# Patient Record
Sex: FEMALE | Race: WHITE | NOT HISPANIC OR LATINO | Employment: OTHER | URBAN - METROPOLITAN AREA
[De-identification: names, ages, dates, MRNs, and addresses within clinical notes are randomized per-mention and may not be internally consistent; named-entity substitution may affect disease eponyms.]

---

## 2017-06-14 PROBLEM — G56.00 ACUTE CARPAL TUNNEL SYNDROME: Status: ACTIVE | Noted: 2017-06-14

## 2018-08-13 PROBLEM — M67.02: Status: ACTIVE | Noted: 2018-08-13

## 2018-08-13 PROBLEM — M67.88 ACHILLES TENDONOSIS OF LEFT LOWER EXTREMITY: Status: ACTIVE | Noted: 2018-08-13

## 2019-05-01 ENCOUNTER — TELEPHONE (OUTPATIENT)
Dept: FAMILY MEDICINE | Facility: CLINIC | Age: 74
End: 2019-05-01

## 2019-05-02 ENCOUNTER — TELEPHONE (OUTPATIENT)
Dept: FAMILY MEDICINE | Facility: CLINIC | Age: 74
End: 2019-05-02

## 2019-05-02 NOTE — TELEPHONE ENCOUNTER
----- Message from Karishma Cardoza sent at 5/1/2019  4:49 PM CDT -----  Contact: pt  Calling in regards to a missed call and not sure why and please advise [phone] (home)

## 2019-06-26 ENCOUNTER — PATIENT OUTREACH (OUTPATIENT)
Dept: ADMINISTRATIVE | Facility: HOSPITAL | Age: 74
End: 2019-06-26

## 2019-06-26 NOTE — PROGRESS NOTES
Health Maintenance Due   Topic Date Due    Hepatitis C Screening  1945    TETANUS VACCINE  10/02/1965    Shingles Vaccine (1 of 2) 10/02/1997    Pneumococcal Vaccine (65+ Low/Medium Risk) (1 of 2 - PCV13) 10/02/2012     Chart review completed 06/26/2019  Future Appointments   Date Time Provider Department Center   7/10/2019  8:00 AM Connor Viramontes MD [facility]

## 2019-08-06 ENCOUNTER — TELEPHONE (OUTPATIENT)
Dept: FAMILY MEDICINE | Facility: CLINIC | Age: 74
End: 2019-08-06

## 2019-08-06 DIAGNOSIS — E03.4 HYPOTHYROIDISM DUE TO ACQUIRED ATROPHY OF THYROID: Primary | ICD-10-CM

## 2019-08-06 NOTE — TELEPHONE ENCOUNTER
----- Message from De Naranjo sent at 8/6/2019  9:59 AM CDT -----  Contact: pt  Type: Needs Medical Advice    Who Called:  pt    Best Call Back Number: [phone]  Additional Information: Pt is following Dr. Oubre to [facility]. Pt has an appointment set on on 9/30/19. Pt would like orders for blood work sent to the [facility]. Please call to advise.

## 2019-09-17 ENCOUNTER — PATIENT OUTREACH (OUTPATIENT)
Dept: ADMINISTRATIVE | Facility: HOSPITAL | Age: 74
End: 2019-09-17

## 2019-09-30 ENCOUNTER — OFFICE VISIT (OUTPATIENT)
Dept: FAMILY MEDICINE | Facility: CLINIC | Age: 74
End: 2019-09-30
Payer: MEDICARE

## 2019-09-30 VITALS
DIASTOLIC BLOOD PRESSURE: 80 MMHG | HEIGHT: 68 IN | BODY MASS INDEX: 35.11 KG/M2 | WEIGHT: 231.69 LBS | TEMPERATURE: 98 F | OXYGEN SATURATION: 95 % | SYSTOLIC BLOOD PRESSURE: 128 MMHG | HEART RATE: 82 BPM

## 2019-09-30 DIAGNOSIS — Z00.00 WELLNESS EXAMINATION: Primary | ICD-10-CM

## 2019-09-30 DIAGNOSIS — K21.9 GASTROESOPHAGEAL REFLUX DISEASE WITHOUT ESOPHAGITIS: ICD-10-CM

## 2019-09-30 DIAGNOSIS — I48.0 PAROXYSMAL ATRIAL FIBRILLATION: ICD-10-CM

## 2019-09-30 DIAGNOSIS — Z86.19 HISTORY OF HEPATITIS C: ICD-10-CM

## 2019-09-30 DIAGNOSIS — E78.2 MIXED HYPERLIPIDEMIA: ICD-10-CM

## 2019-09-30 DIAGNOSIS — M79.7 FIBROMYALGIA: ICD-10-CM

## 2019-09-30 DIAGNOSIS — D68.4: ICD-10-CM

## 2019-09-30 DIAGNOSIS — G43.009 MIGRAINE WITHOUT AURA AND WITHOUT STATUS MIGRAINOSUS, NOT INTRACTABLE: ICD-10-CM

## 2019-09-30 DIAGNOSIS — E03.4 HYPOTHYROIDISM DUE TO ACQUIRED ATROPHY OF THYROID: ICD-10-CM

## 2019-09-30 PROCEDURE — 99999 PR PBB SHADOW E&M-EST. PATIENT-LVL III: ICD-10-PCS | Mod: PBBFAC,,, | Performed by: INTERNAL MEDICINE

## 2019-09-30 PROCEDURE — 90662 IIV NO PRSV INCREASED AG IM: CPT | Mod: PBBFAC,PO

## 2019-09-30 PROCEDURE — 99397 PR PREVENTIVE VISIT,EST,65 & OVER: ICD-10-PCS | Mod: S$PBB,,, | Performed by: INTERNAL MEDICINE

## 2019-09-30 PROCEDURE — 99397 PER PM REEVAL EST PAT 65+ YR: CPT | Mod: S$PBB,,, | Performed by: INTERNAL MEDICINE

## 2019-09-30 PROCEDURE — 99999 PR PBB SHADOW E&M-EST. PATIENT-LVL III: CPT | Mod: PBBFAC,,, | Performed by: INTERNAL MEDICINE

## 2019-09-30 PROCEDURE — 99213 OFFICE O/P EST LOW 20 MIN: CPT | Mod: PBBFAC,PO,25 | Performed by: INTERNAL MEDICINE

## 2019-09-30 PROCEDURE — G0009 ADMIN PNEUMOCOCCAL VACCINE: HCPCS | Mod: PBBFAC,PO

## 2019-09-30 RX ORDER — POLYETHYLENE GLYCOL 3350 17 G/17G
17 POWDER, FOR SOLUTION ORAL DAILY PRN
COMMUNITY
End: 2023-04-24

## 2019-09-30 RX ORDER — CALCIUM CARBONATE 600 MG
TABLET ORAL
COMMUNITY
End: 2020-11-06

## 2019-09-30 NOTE — PROGRESS NOTES
Patient ID: Lauren Junior     Chief Complaint:   Chief Complaint   Patient presents with    \[facility] [place] [facility]\"" Care        HPI: New Patient to [facility] but known to me. Wellness exam. Doing well after Left achilles repair by Luis. Labs reviewed: all great but HDL and LDL high. We discussed diet and exercise because she declines med for now.     Review of Systems   Constitutional: Positive for activity change. Negative for unexpected weight change.   HENT: Negative.  Negative for hearing loss, rhinorrhea and trouble swallowing.    Eyes: Negative.  Negative for discharge and visual disturbance.   Respiratory: Negative.  Negative for chest tightness and wheezing.    Cardiovascular: Negative.  Negative for chest pain and palpitations.   Gastrointestinal: Positive for constipation. Negative for blood in stool, diarrhea and vomiting.   Endocrine: Negative.  Negative for polydipsia and polyuria.   Genitourinary: Negative.  Negative for difficulty urinating, dysuria, hematuria and menstrual problem.   Musculoskeletal: Negative.  Negative for arthralgias, joint swelling and neck pain.   Skin: Negative.    Allergic/Immunologic: Negative.    Neurological: Negative.  Negative for weakness and headaches.   Hematological: Negative.    Psychiatric/Behavioral: Negative.  Negative for confusion and dysphoric mood.   All other systems reviewed and are negative.         Objective:      Physical Exam   Physical Exam   Constitutional: She is oriented to person, place, and time. She appears well-developed and well-nourished.   HENT:   Head: Normocephalic and atraumatic.   Nose: Nose normal.   Mouth/Throat: Oropharynx is clear and moist.   Eyes: Pupils are equal, round, and reactive to light. Conjunctivae and EOM are normal.   Neck: Normal range of motion. Neck supple.   Cardiovascular: Normal rate, regular rhythm, normal heart sounds and intact distal pulses.   Pulmonary/Chest: Effort normal and breath sounds normal.   Abdominal: Soft. Bowel  "sounds are normal.   Musculoskeletal: Normal range of motion.   Neurological: She is alert and oriented to person, place, and time.   Skin: Skin is warm and dry. Capillary refill takes less than 2 seconds.   Psychiatric: She has a normal mood and affect. Her behavior is normal. Judgment and thought content normal.   Nursing note and vitals reviewed.      Current Outpatient Medications:     amitriptyline (ELAVIL) 100 MG tablet, Take 100 mg by mouth every evening. , Disp: , Rfl: 0    calcium carbonate (CALCIUM 600) 600 mg calcium (1,500 mg) Tab, Calcium 600  1 po qam, Disp: , Rfl:     calcium carbonate (OS-KATLYN) 600 mg (1,500 mg) Tab, Take 600 mg by mouth 2 (two) times daily with meals., Disp: , Rfl:     levothyroxine (SYNTHROID) 88 MCG tablet, Take 100 mcg by mouth once daily. , Disp: , Rfl: 0    LYRICA 100 mg capsule, Take 100 mg by mouth 2 (two) times daily. , Disp: , Rfl: 2    multivitamin (ONE DAILY MULTIVITAMIN) per tablet, Take 1 tablet by mouth once daily., Disp: , Rfl:     multivitamin Tab, multivitamin tablet  Take 1 tablet every day by oral route., Disp: , Rfl:     polyethylene glycol (GLYCOLAX) 17 gram/dose powder, Miralax 17 gram/dose oral powder  17g prn constipation, Disp: , Rfl:     tramadol (ULTRAM) 50 mg tablet, Take 50 mg by mouth as needed for Pain., Disp: , Rfl:     venlafaxine (EFFEXOR-XR) 75 MG 24 hr capsule, Take 75 mg by mouth 2 (two) times daily. , Disp: , Rfl: 5    vitamin D 1000 units Tab, Take 2,000 Units by mouth once daily. 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    Diagnoses and all orders for this visit:    Wellness examination    Fibromyalgia  Controlled     Deficiency of clotting factor  Has a tendency to bleed freely    History of hepatitis C  Hep C Ab will be persistently Positive- cured w/ Interferon & Ribavarin    Migraine without aura and without status migrainosus, not intractable  Controlled     Paroxysmal atrial fibrillation  No issues in many years     Hypothyroidism due to acquired atrophy of thyroid  Controlled     Gastroesophageal reflux disease without esophagitis  Controlled     Mixed Hyperlipidemia  Monitor after diet and weight loss    Other orders  -     (In Office Administered) Pneumococcal Conjugate Vaccine (13 Valent) (IM)       HD Flu shot & Prevnar today

## 2019-11-18 NOTE — TELEPHONE ENCOUNTER
----- Message from Francisca Howard sent at 11/18/2019  3:03 PM CST -----  Contact: Travon  Type:  RX Refill Request    Who Called:  Travon, [facility]  Refill or New Rx:  Refill   RX Name and Strength:  levothyroxine (SYNTHROID) 88 MCG tablet  How is the patient currently taking it? (ex. 1XDay):  1XDay  Is this a 30 day or 90 day RX:    Preferred Pharmacy with phone number:  [facility]   Fax #: [phone]  escript id: [phone]    Local or Mail Order:  mail  Ordering Provider:    Best Call Back Number:  [phone]  Additional Information:

## 2019-11-19 RX ORDER — LEVOTHYROXINE SODIUM 100 UG/1
100 TABLET ORAL
Qty: 90 TABLET | Refills: 1 | Status: SHIPPED | OUTPATIENT
Start: 2019-11-19 | End: 2020-01-08 | Stop reason: SDUPTHER

## 2020-01-08 ENCOUNTER — TELEPHONE (OUTPATIENT)
Dept: FAMILY MEDICINE | Facility: CLINIC | Age: 75
End: 2020-01-08

## 2020-01-08 DIAGNOSIS — E03.4 HYPOTHYROIDISM DUE TO ACQUIRED ATROPHY OF THYROID: Primary | ICD-10-CM

## 2020-01-08 DIAGNOSIS — M79.7 FIBROMYALGIA: ICD-10-CM

## 2020-01-08 RX ORDER — LEVOTHYROXINE SODIUM 100 UG/1
100 TABLET ORAL
Qty: 90 TABLET | Refills: 1 | Status: SHIPPED | OUTPATIENT
Start: 2020-01-08 | End: 2020-01-09 | Stop reason: SDUPTHER

## 2020-01-08 NOTE — TELEPHONE ENCOUNTER
----- Message from Shan Mitchell sent at 1/8/2020 12:31 PM CST -----  Contact: pt  Type:  RX Refill Request    Who Called:  pt  Refill or New Rx:  refill  RX Name and Strength:  levothyroxine (SYNTHROID) 100 MCG tablet and tramadol (ULTRAM) 50 mg tablet  How is the patient currently taking it? (ex. 1XDay):  1 x / as needed  Is this a 30 day or 90 day RX:  30  Preferred Pharmacy with phone number:    [id number] - [place] [address] [address] [place] [id number]  Phone: [phone] Fax: [phone]    Local or Mail Order:    Ordering Provider:    Best Call Back Number:  [phone]  Additional Information:  Pt states the synthroid should be 112 mcg not 100.  Pt has 7 days left of the Rx

## 2020-01-08 NOTE — TELEPHONE ENCOUNTER
Pt states the synthroid should be 112 mcg not 100.  Pt has 7 days left of the Rx...     Ultram comes in 50mg.. No change in that dose, just needs refill.

## 2020-01-09 RX ORDER — TRAMADOL HYDROCHLORIDE 50 MG/1
50 TABLET ORAL EVERY 6 HOURS PRN
Qty: 120 TABLET | Refills: 0 | Status: SHIPPED | OUTPATIENT
Start: 2020-01-09 | End: 2020-01-14 | Stop reason: SDUPTHER

## 2020-01-09 RX ORDER — LEVOTHYROXINE SODIUM 112 UG/1
112 TABLET ORAL
Qty: 90 TABLET | Refills: 1 | Status: SHIPPED | OUTPATIENT
Start: 2020-01-09 | End: 2020-07-27

## 2020-01-09 NOTE — TELEPHONE ENCOUNTER
Callback to patient-- Pt made aware that prescriptions were sent to [facility] pharmacy.

## 2020-01-14 ENCOUNTER — DOCUMENTATION ONLY (OUTPATIENT)
Dept: FAMILY MEDICINE | Facility: CLINIC | Age: 75
End: 2020-01-14

## 2020-01-14 DIAGNOSIS — M79.7 FIBROMYALGIA: ICD-10-CM

## 2020-01-14 NOTE — PROGRESS NOTES
PA:    Tramadol 50 mg tablets    [facility] key:   [id number]  Case ID:    [id number]  [employer]  ----------------------------    Determination:   APPROVED  Valid:  1/15/2020-12/31/2020

## 2020-01-15 ENCOUNTER — TELEPHONE (OUTPATIENT)
Dept: FAMILY MEDICINE | Facility: CLINIC | Age: 75
End: 2020-01-15

## 2020-01-15 RX ORDER — TRAMADOL HYDROCHLORIDE 50 MG/1
50 TABLET ORAL EVERY 6 HOURS PRN
Qty: 120 TABLET | Refills: 0 | Status: SHIPPED | OUTPATIENT
Start: 2020-01-15 | End: 2021-07-29 | Stop reason: SDUPTHER

## 2020-01-15 NOTE — TELEPHONE ENCOUNTER
----- Message from Sonal Perera sent at 1/15/2020  8:55 AM CST -----  Contact: Miles   Type:  Pharmacy Calling to Clarify an RX    Name of Caller:  Miles clinical pharmacy review-[facility] Name:   Prescription Name:  Tramadol Hcl What do they need to clarify?:    Best Call Back Number:  [phone]  Additional Information:  Rx need PA

## 2020-07-26 DIAGNOSIS — E03.4 HYPOTHYROIDISM DUE TO ACQUIRED ATROPHY OF THYROID: ICD-10-CM

## 2020-07-27 RX ORDER — LEVOTHYROXINE SODIUM 112 UG/1
TABLET ORAL
Qty: 90 TABLET | Refills: 0 | Status: SHIPPED | OUTPATIENT
Start: 2020-07-27 | End: 2020-11-06 | Stop reason: SDUPTHER

## 2020-10-05 ENCOUNTER — PATIENT MESSAGE (OUTPATIENT)
Dept: ADMINISTRATIVE | Facility: HOSPITAL | Age: 75
End: 2020-10-05

## 2020-10-06 ENCOUNTER — PATIENT MESSAGE (OUTPATIENT)
Dept: FAMILY MEDICINE | Facility: CLINIC | Age: 75
End: 2020-10-06

## 2020-10-07 RX ORDER — OMEPRAZOLE 40 MG/1
40 CAPSULE, DELAYED RELEASE ORAL DAILY
COMMUNITY
End: 2020-10-07 | Stop reason: SDUPTHER

## 2020-10-07 NOTE — TELEPHONE ENCOUNTER
Refill Routing Note   Medication(s) are not appropriate for processing by [facility] for the following reason(s):     - Non-participating provider    [facility] actions taken in this encounter: Route          Medication reconciliation completed: No   Automatic Epic Generated Protocol Data:        Requested Prescriptions   Pending Prescriptions Disp Refills    omeprazole (PRILOSEC) 40 MG capsule 30 capsule 2     Sig: Take 1 capsule (40 mg total) by mouth once daily.       Proton Pump Inhibitors Protocol Passed - 10/7/2020  9:49 AM        Passed - Patient is not currently pregnant        Passed - No positive pregnancy test in past 12 months         Passed - Not on Clopidogrel (Plavix) or if on, refill is for Pantoprazole (Protonix)        Passed - No osteoporosis diagnosis on problem list        Passed - Visit with authorizing provider in past 12 months or upcoming 90 days          Signed Prescriptions Disp Refills    omeprazole (PRILOSEC) 40 MG capsule       Sig: Take 40 mg by mouth once daily.       There is no refill protocol information for this order           Appointments  past 12m or future 3m with PCP    Date Provider   Last Visit   9/30/2019 Connor Viramontes MD   Next Visit   11/6/2020 Connor Viramontes MD   ED visits in past 90 days: [unfilled]        Note composed:4:23 PM 10/07/2020

## 2020-10-08 RX ORDER — OMEPRAZOLE 40 MG/1
40 CAPSULE, DELAYED RELEASE ORAL DAILY
Qty: 90 CAPSULE | Refills: 3 | Status: SHIPPED | OUTPATIENT
Start: 2020-10-08 | End: 2022-01-31 | Stop reason: SDUPTHER

## 2020-10-30 ENCOUNTER — PATIENT MESSAGE (OUTPATIENT)
Dept: FAMILY MEDICINE | Facility: CLINIC | Age: 75
End: 2020-10-30

## 2020-10-30 DIAGNOSIS — E03.9 HYPOTHYROIDISM, UNSPECIFIED TYPE: ICD-10-CM

## 2020-10-30 DIAGNOSIS — I48.91 ATRIAL FIBRILLATION, UNSPECIFIED TYPE: ICD-10-CM

## 2020-10-30 DIAGNOSIS — E78.2 MIXED HYPERLIPIDEMIA: Primary | ICD-10-CM

## 2020-11-06 ENCOUNTER — OFFICE VISIT (OUTPATIENT)
Dept: FAMILY MEDICINE | Facility: CLINIC | Age: 75
End: 2020-11-06
Payer: MEDICARE

## 2020-11-06 ENCOUNTER — DOCUMENTATION ONLY (OUTPATIENT)
Dept: FAMILY MEDICINE | Facility: CLINIC | Age: 75
End: 2020-11-06

## 2020-11-06 VITALS
BODY MASS INDEX: 35.75 KG/M2 | DIASTOLIC BLOOD PRESSURE: 84 MMHG | HEART RATE: 83 BPM | HEIGHT: 68 IN | SYSTOLIC BLOOD PRESSURE: 136 MMHG | OXYGEN SATURATION: 96 % | TEMPERATURE: 98 F

## 2020-11-06 DIAGNOSIS — E78.2 MIXED HYPERLIPIDEMIA: ICD-10-CM

## 2020-11-06 DIAGNOSIS — E03.4 HYPOTHYROIDISM DUE TO ACQUIRED ATROPHY OF THYROID: ICD-10-CM

## 2020-11-06 DIAGNOSIS — Z00.00 WELLNESS EXAMINATION: Primary | ICD-10-CM

## 2020-11-06 DIAGNOSIS — I48.0 PAROXYSMAL ATRIAL FIBRILLATION: ICD-10-CM

## 2020-11-06 DIAGNOSIS — F50.81 BINGE EATING DISORDER: ICD-10-CM

## 2020-11-06 PROBLEM — F50.819 BINGE EATING DISORDER: Status: ACTIVE | Noted: 2020-11-06

## 2020-11-06 PROCEDURE — 99397 PR PREVENTIVE VISIT,EST,65 & OVER: ICD-10-PCS | Mod: S$PBB,,, | Performed by: INTERNAL MEDICINE

## 2020-11-06 PROCEDURE — 99397 PER PM REEVAL EST PAT 65+ YR: CPT | Mod: S$PBB,,, | Performed by: INTERNAL MEDICINE

## 2020-11-06 PROCEDURE — 99213 OFFICE O/P EST LOW 20 MIN: CPT | Mod: PBBFAC,PO | Performed by: INTERNAL MEDICINE

## 2020-11-06 PROCEDURE — 99999 PR PBB SHADOW E&M-EST. PATIENT-LVL III: CPT | Mod: PBBFAC,,, | Performed by: INTERNAL MEDICINE

## 2020-11-06 PROCEDURE — 99999 PR PBB SHADOW E&M-EST. PATIENT-LVL III: ICD-10-PCS | Mod: PBBFAC,,, | Performed by: INTERNAL MEDICINE

## 2020-11-06 RX ORDER — LISDEXAMFETAMINE DIMESYLATE 30 MG/1
30 CAPSULE ORAL EVERY MORNING
Qty: 30 CAPSULE | Refills: 0 | Status: SHIPPED | OUTPATIENT
Start: 2020-11-06 | End: 2020-11-06 | Stop reason: DRUGHIGH

## 2020-11-06 RX ORDER — LEVOTHYROXINE SODIUM 112 UG/1
112 TABLET ORAL DAILY
Qty: 90 TABLET | Refills: 3 | Status: SHIPPED | OUTPATIENT
Start: 2020-11-06 | End: 2021-10-07

## 2020-11-06 RX ORDER — LISDEXAMFETAMINE DIMESYLATE CAPSULES 10 MG/1
10 CAPSULE ORAL DAILY
Qty: 30 CAPSULE | Refills: 0 | Status: SHIPPED | OUTPATIENT
Start: 2020-11-06 | End: 2021-07-19

## 2020-11-06 NOTE — PROGRESS NOTES
Patient ID: Lauren Junior     Chief Complaint:   Chief Complaint   Patient presents with    Review labs        HPI: Wellness exam. Doing ok overall, but appropriately upset due to her best friend passing away from MRSA sepsis, her brother is in memory care,  had back surgery. She is in therapy due to binge eating disorder. We discussed and will try Vyvanse. Labs reviewed and are all good. LDL and HDL both high and ratio good. She declines statins for now. ACP talk at next office visit     Review of Systems   Constitutional: Positive for unexpected weight change. Negative for activity change.   HENT: Negative.  Negative for hearing loss, rhinorrhea and trouble swallowing.    Eyes: Negative.  Negative for discharge and visual disturbance.   Respiratory: Negative.  Negative for chest tightness and wheezing.    Cardiovascular: Negative.  Negative for chest pain and palpitations.   Gastrointestinal: Negative.  Negative for blood in stool, constipation, diarrhea and vomiting.   Endocrine: Negative.  Negative for polydipsia and polyuria.   Genitourinary: Negative.  Negative for difficulty urinating, dysuria, hematuria and menstrual problem.   Musculoskeletal: Negative.  Negative for arthralgias, joint swelling and neck pain.   Skin: Negative.    Allergic/Immunologic: Negative.    Neurological: Negative.  Negative for weakness and headaches.   Hematological: Negative.    Psychiatric/Behavioral: Negative.  Negative for confusion and dysphoric mood.          Objective:      Physical Exam   Physical Exam  Vitals signs and nursing note reviewed.   Constitutional:       Appearance: Normal appearance. She is well-developed.   HENT:      Head: Normocephalic and atraumatic.      Nose: Nose normal.   Eyes:      Extraocular Movements: Extraocular movements intact.      Conjunctiva/sclera: Conjunctivae normal.      Pupils: Pupils are equal, round, and reactive to light.   Neck:      Musculoskeletal: Normal range of motion and  "neck supple.   Cardiovascular:      Rate and Rhythm: Normal rate and regular rhythm.      Pulses: Normal pulses.      Heart sounds: Normal heart sounds.   Pulmonary:      Effort: Pulmonary effort is normal.      Breath sounds: Normal breath sounds.   Abdominal:      General: Bowel sounds are normal.      Palpations: Abdomen is soft.   Musculoskeletal: Normal range of motion.   Skin:     General: Skin is warm and dry.      Capillary Refill: Capillary refill takes less than 2 seconds.   Neurological:      General: No focal deficit present.      Mental Status: She is alert and oriented to person, place, and time.   Psychiatric:         Mood and Affect: Mood normal.         Behavior: Behavior normal.         Thought Content: Thought content normal.         Judgment: Judgment normal.            Vitals:   Vitals:    11/06/20 1107   BP: 136/84   Pulse: 83   Temp: 98.1 °F (36.7 °C)   TempSrc: Oral   SpO2: 96%   Weight: Comment: Patient refused   Height: 5' 7.5" (1.715 m)          Current Outpatient Medications:     amitriptyline (ELAVIL) 100 MG tablet, Take 100 mg by mouth every evening. , Disp: , Rfl: 0    levothyroxine (SYNTHROID) 112 MCG tablet, TAKE 1 TABLET BY MOUTH ONCE DAILY BEFORE BREAKFAST, Disp: 90 tablet, Rfl: 0    LYRICA 100 mg capsule, Take 100 mg by mouth 2 (two) times daily. , Disp: , Rfl: 2    multivitamin Tab, multivitamin tablet  Take 1 tablet every day by oral route., Disp: , Rfl:     omeprazole (PRILOSEC) 40 MG capsule, Take 1 capsule (40 mg total) by mouth once daily., Disp: 90 capsule, Rfl: 3    polyethylene glycol (GLYCOLAX) 17 gram/dose powder, Miralax 17 gram/dose oral powder  17g prn constipation, Disp: , Rfl:     traMADol (ULTRAM) 50 mg tablet, Take 1 tablet (50 mg total) by mouth every 6 (six) hours as needed for Pain., Disp: 120 tablet, Rfl: 0    venlafaxine (EFFEXOR-XR) 75 MG 24 hr capsule, Take 75 mg by mouth 2 (two) times daily. , Disp: , Rfl: 5    vitamin D 1000 units Tab, Take " 2,000 Units by mouth once daily. , Disp: , Rfl:     lisdexamfetamine (VYVANSE) 30 MG capsule, Take 1 capsule (30 mg total) by mouth every morning., Disp: 30 capsule, Rfl: 0   Assessment:       Patient Active Problem List    Diagnosis Date Noted    Binge eating disorder 11/06/2020    Mixed hyperlipidemia 09/30/2019    Fibromyalgia     Deficiency of clotting factor     History of hepatitis C     Migraines     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    Diagnoses and all orders for this visit:    Lauren was seen today for review labs.    Diagnoses and all orders for this visit:    Wellness examination    Binge eating disorder  -     lisdexamfetamine (VYVANSE) 10 MG capsule; Take 1 capsule (30 mg total) by mouth every morning.    Mixed hyperlipidemia  Monitor     Hypothyroidism due to acquired atrophy of thyroid  Controlled    Paroxysmal atrial fibrillation  Controlled

## 2020-11-09 ENCOUNTER — PATIENT OUTREACH (OUTPATIENT)
Dept: ADMINISTRATIVE | Facility: HOSPITAL | Age: 75
End: 2020-11-09

## 2020-11-09 ENCOUNTER — PATIENT MESSAGE (OUTPATIENT)
Dept: FAMILY MEDICINE | Facility: CLINIC | Age: 75
End: 2020-11-09

## 2020-11-12 ENCOUNTER — PATIENT MESSAGE (OUTPATIENT)
Dept: FAMILY MEDICINE | Facility: CLINIC | Age: 75
End: 2020-11-12

## 2020-11-13 ENCOUNTER — PATIENT MESSAGE (OUTPATIENT)
Dept: FAMILY MEDICINE | Facility: CLINIC | Age: 75
End: 2020-11-13

## 2020-11-13 DIAGNOSIS — I10 ESSENTIAL HYPERTENSION: Primary | ICD-10-CM

## 2020-11-13 RX ORDER — AMLODIPINE BESYLATE 5 MG/1
5 TABLET ORAL DAILY
Qty: 30 TABLET | Refills: 11 | Status: SHIPPED | OUTPATIENT
Start: 2020-11-13 | End: 2021-01-27 | Stop reason: SDUPTHER

## 2020-11-14 ENCOUNTER — PATIENT MESSAGE (OUTPATIENT)
Dept: FAMILY MEDICINE | Facility: CLINIC | Age: 75
End: 2020-11-14

## 2020-12-04 ENCOUNTER — OFFICE VISIT (OUTPATIENT)
Dept: FAMILY MEDICINE | Facility: CLINIC | Age: 75
End: 2020-12-04
Payer: MEDICARE

## 2020-12-04 VITALS
SYSTOLIC BLOOD PRESSURE: 128 MMHG | OXYGEN SATURATION: 96 % | HEART RATE: 81 BPM | BODY MASS INDEX: 35.75 KG/M2 | TEMPERATURE: 98 F | HEIGHT: 68 IN | DIASTOLIC BLOOD PRESSURE: 82 MMHG

## 2020-12-04 DIAGNOSIS — Z71.89 ADVANCED CARE PLANNING/COUNSELING DISCUSSION: Primary | ICD-10-CM

## 2020-12-04 DIAGNOSIS — I10 ESSENTIAL HYPERTENSION: ICD-10-CM

## 2020-12-04 DIAGNOSIS — Z78.0 MENOPAUSE: ICD-10-CM

## 2020-12-04 PROCEDURE — 99214 OFFICE O/P EST MOD 30 MIN: CPT | Mod: S$PBB,,, | Performed by: INTERNAL MEDICINE

## 2020-12-04 PROCEDURE — 99214 PR OFFICE/OUTPT VISIT, EST, LEVL IV, 30-39 MIN: ICD-10-PCS | Mod: S$PBB,,, | Performed by: INTERNAL MEDICINE

## 2020-12-04 PROCEDURE — 99999 PR PBB SHADOW E&M-EST. PATIENT-LVL IV: ICD-10-PCS | Mod: PBBFAC,,, | Performed by: INTERNAL MEDICINE

## 2020-12-04 PROCEDURE — 99497 PR ADVNCD CARE PLAN 30 MIN: ICD-10-PCS | Mod: S$PBB,,, | Performed by: INTERNAL MEDICINE

## 2020-12-04 PROCEDURE — 99999 PR PBB SHADOW E&M-EST. PATIENT-LVL IV: CPT | Mod: PBBFAC,,, | Performed by: INTERNAL MEDICINE

## 2020-12-04 PROCEDURE — 99214 OFFICE O/P EST MOD 30 MIN: CPT | Mod: PBBFAC,PO,25 | Performed by: INTERNAL MEDICINE

## 2020-12-04 PROCEDURE — 99497 ADVNCD CARE PLAN 30 MIN: CPT | Mod: PBBFAC,PO | Performed by: INTERNAL MEDICINE

## 2020-12-04 PROCEDURE — 99497 ADVNCD CARE PLAN 30 MIN: CPT | Mod: S$PBB,,, | Performed by: INTERNAL MEDICINE

## 2020-12-04 NOTE — PROGRESS NOTES
Patient ID: Lauren Junior     Chief Complaint:   Chief Complaint   Patient presents with    Follow up ACP    Weight Check        HPI:  Here to monitor weight loss after we started Vyvanse.  However, we had to stop Vyvanse because it gave the patient high blood pressure.  We have since started low-dose amlodipine to modulate her blood pressure and is working very well.  As such, she has started a gym routine exercising 3 days a week for least an hour doing combination of aerobic and anaerobic exercises with strength training and is feeling very good.  Were also here to discuss advance care planning.  She has had a living will and medical power  completed and signed since 2011 and I have reviewed the living will with her.  She will get me a copy of her medical power  who is her .  We discussed DNR status and we both agree that she should be a full code at this point and we have completed the look post form accordingly.    Review of Systems   Constitutional: Negative.    HENT: Negative.    Eyes: Negative.    Respiratory: Negative.    Cardiovascular: Negative.    Gastrointestinal: Negative.    Endocrine: Negative.    Genitourinary: Negative.    Musculoskeletal: Negative.    Skin: Negative.    Allergic/Immunologic: Negative.    Neurological: Negative.    Hematological: Negative.    Psychiatric/Behavioral: Negative.           Objective:      Physical Exam   Physical Exam  Vitals signs and nursing note reviewed.   Constitutional:       Appearance: Normal appearance. She is well-developed.   HENT:      Head: Normocephalic and atraumatic.      Nose: Nose normal.   Eyes:      Extraocular Movements: Extraocular movements intact.      Conjunctiva/sclera: Conjunctivae normal.      Pupils: Pupils are equal, round, and reactive to light.   Neck:      Musculoskeletal: Normal range of motion and neck supple.   Cardiovascular:      Rate and Rhythm: Normal rate and regular rhythm.      Pulses: Normal pulses.       Heart sounds: Normal heart sounds.   Pulmonary:      Effort: Pulmonary effort is normal.      Breath sounds: Normal breath sounds.   Abdominal:      General: Bowel sounds are normal.      Palpations: Abdomen is soft.   Musculoskeletal: Normal range of motion.   Skin:     General: Skin is warm and dry.      Capillary Refill: Capillary refill takes less than 2 seconds.   Neurological:      General: No focal deficit present.      Mental Status: She is alert and oriented to person, place, and time.   Psychiatric:         Mood and Affect: Mood normal.         Behavior: Behavior normal.         Thought Content: Thought content normal.         Judgment: Judgment normal.     Advance Care Planning     Power of   I initiated the process of advance care planning today and explained the importance of this process to the patient.  I introduced the concept of advance directives to the patient, as well. Then the patient received detailed information about the importance of designating a Health Care Power of  (HCPOA). She was also instructed to communicate with this person about their wishes for future healthcare, should she become sick and lose decision-making capacity. The patient has previously appointed a HCPOA. After our discussion, the patient has decided to complete a HCPOA and has appointed her , NAME:Froy Junior NUMBER: [phone] I spent a total time of 20 minutes discussing this issue with the patient.         Living Will  During this visit, I engaged the patient  in the advance care planning process.  The patient and I reviewed the role for advance directives and their purpose in directing future healthcare if the patient's unable to speak for him/herself.  At this point in time, the patient does have full decision-making capacity.  We discussed different extreme health states that she could experience, and reviewed what kind of medical care she would want in those situations.  The patient  "communicated that if she were comatose and had little chance of a meaningful recovery, she would not want machines/life-sustaining treatments used. In addition to the above preference, other important end-of-life issues for the patient include comfort care. The patient has completed a living will to reflect these preferences.  I spent a total of 20 minutes engaging the patient in this advance care planning discussion.          Code Status  FULL CODE                      Vitals:   Vitals:    12/04/20 1118   BP: 128/82   Pulse: 81   Temp: 98.3 °F (36.8 °C)   TempSrc: Oral   SpO2: 96%   Weight: Comment: Declined   Height: 5' 7.5" (1.715 m)          Current Outpatient Medications:     amitriptyline (ELAVIL) 100 MG tablet, Take 100 mg by mouth every evening. , Disp: , Rfl: 0    amLODIPine (NORVASC) 5 MG tablet, Take 1 tablet (5 mg total) by mouth once daily., Disp: 30 tablet, Rfl: 11    levothyroxine (SYNTHROID) 112 MCG tablet, Take 1 tablet (112 mcg total) by mouth once daily., Disp: 90 tablet, Rfl: 3    lisdexamfetamine (VYVANSE) 10 mg Cap, Take 10 mg by mouth once daily., Disp: 30 capsule, Rfl: 0    LYRICA 100 mg capsule, Take 100 mg by mouth 2 (two) times daily. , Disp: , Rfl: 2    multivitamin Tab, multivitamin tablet  Take 1 tablet every day by oral route., Disp: , Rfl:     omeprazole (PRILOSEC) 40 MG capsule, Take 1 capsule (40 mg total) by mouth once daily., Disp: 90 capsule, Rfl: 3    polyethylene glycol (GLYCOLAX) 17 gram/dose powder, Miralax 17 gram/dose oral powder  17g prn constipation, Disp: , Rfl:     traMADol (ULTRAM) 50 mg tablet, Take 1 tablet (50 mg total) by mouth every 6 (six) hours as needed for Pain., Disp: 120 tablet, Rfl: 0    venlafaxine (EFFEXOR-XR) 75 MG 24 hr capsule, Take 75 mg by mouth 2 (two) times daily. , Disp: , Rfl: 5    vitamin D 1000 units Tab, Take 2,000 Units by mouth once daily. 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    Diagnoses and all orders for this visit:    Lauren was seen today for follow up acp and weight check.    Diagnoses and all orders for this visit:    Advanced care planning/counseling discussion  Forms completed     Menopause  -     DXA Bone Density Spine And Hip; Future    Essential hypertension  Controlled

## 2020-12-11 ENCOUNTER — PATIENT MESSAGE (OUTPATIENT)
Dept: OTHER | Facility: OTHER | Age: 75
End: 2020-12-11

## 2020-12-29 ENCOUNTER — PATIENT MESSAGE (OUTPATIENT)
Dept: FAMILY MEDICINE | Facility: CLINIC | Age: 75
End: 2020-12-29

## 2020-12-30 ENCOUNTER — PATIENT MESSAGE (OUTPATIENT)
Dept: FAMILY MEDICINE | Facility: CLINIC | Age: 75
End: 2020-12-30

## 2020-12-30 ENCOUNTER — OFFICE VISIT (OUTPATIENT)
Dept: FAMILY MEDICINE | Facility: CLINIC | Age: 75
End: 2020-12-30
Payer: MEDICARE

## 2020-12-30 DIAGNOSIS — I10 ESSENTIAL HYPERTENSION: Primary | ICD-10-CM

## 2020-12-30 PROCEDURE — 99213 OFFICE O/P EST LOW 20 MIN: CPT | Mod: 95,,, | Performed by: NURSE PRACTITIONER

## 2020-12-30 PROCEDURE — 99213 PR OFFICE/OUTPT VISIT, EST, LEVL III, 20-29 MIN: ICD-10-PCS | Mod: 95,,, | Performed by: NURSE PRACTITIONER

## 2020-12-30 RX ORDER — METOPROLOL SUCCINATE 25 MG/1
25 TABLET, EXTENDED RELEASE ORAL DAILY
Qty: 30 TABLET | Refills: 11 | Status: SHIPPED | OUTPATIENT
Start: 2020-12-30 | End: 2021-01-27 | Stop reason: SDUPTHER

## 2020-12-30 NOTE — PROGRESS NOTES
Lauren Junior is a 75 y.o. female patient of Dr. Connor Viramontes MD who presents to the clinic today for No chief complaint on file.  .    HPI    Patient, who is not known to me, reports a new problem to me: she and Dr. Viramontes had discussed Vyvance for binge eating.  Has been eating a lot.  Daughter has been doing PT.  H/o afib--last 10 yrs ago.  /110; then a little better.  BP was elevated after the 2nd pill as well.  She sent the BP readings to Dr. Viramontes--checked against another BP cuff and it was ok..    Answers for HPI/ROS submitted by the patient on 12/30/2020   Hypertension  Chronicity: recurrent  Onset: more than 1 month ago  Progression since onset: unchanged  Condition status: resistant  anxiety: No  blurred vision: No  chest pain: No  headaches: No  malaise/fatigue: No  neck pain: No  orthopnea: No  palpitations: No  peripheral edema: No  PND: No  shortness of breath: No  sweats: No  Agents associated with hypertension: thyroid hormones  CAD risks: family history, obesity  Compliance problems: no compliance problems  Past treatments: calcium channel blockers  Improvement on treatment: no improvement      These symptoms began nearly 1 month  ago and status is continued elevation of her BP--DBP 90+ every day since 12/4/2020..     Pt denies the following symptoms:  Severe headache, change in vision, n/v, chest pain, trouble breathing    Aggravating factors include Vyvanse for binge eating .    Relieving factors include none yet .    OTC Medications tried are n/a.    Prescription medications taken for symptoms are amlodipine 5 mg.    Pertinent medical history:  Recently started on Vyvanse for binge eating.  Now BP elevated since taking the very first dose 12/4/2020.    ROS    Constitutional:  No fever, + fatigue r/t fibromyalgia.    HEENT:  No headache or change in vision.    Heart:    + palpitations r/t known h/o afib, no chest pain.    Lungs:   no difficulty breathing.    GI:  no nausea, no vomiting, no  diarrhea, no constipation    Urinary:  No change in urination.    MS:  No change in bones, joints or muscles.    Skin:  No concerns      Past Medical History:   Diagnosis Date    Acid reflux 9/9/2015    Arthritis     Atrial fibrillation 9/9/2015    Deficiency of clotting factor     Factor 11    Elevated blood pressure 9/9/2015    Encounter for blood transfusion 1967    Fibromyalgia     History of hepatitis C     s/p Interferon & Ribavarin     Hypothyroidism 9/9/2015    Kidney stones     Migraines     Thoracic outlet syndrome     s/p B rib resections       Current Outpatient Medications:     amitriptyline (ELAVIL) 100 MG tablet, Take 100 mg by mouth every evening. , Disp: , Rfl: 0    amLODIPine (NORVASC) 5 MG tablet, Take 1 tablet (5 mg total) by mouth once daily., Disp: 30 tablet, Rfl: 11    levothyroxine (SYNTHROID) 112 MCG tablet, Take 1 tablet (112 mcg total) by mouth once daily., Disp: 90 tablet, Rfl: 3    lisdexamfetamine (VYVANSE) 10 mg Cap, Take 10 mg by mouth once daily., Disp: 30 capsule, Rfl: 0    LYRICA 100 mg capsule, Take 100 mg by mouth 2 (two) times daily. , Disp: , Rfl: 2    multivitamin Tab, multivitamin tablet  Take 1 tablet every day by oral route., Disp: , Rfl:     omeprazole (PRILOSEC) 40 MG capsule, Take 1 capsule (40 mg total) by mouth once daily., Disp: 90 capsule, Rfl: 3    polyethylene glycol (GLYCOLAX) 17 gram/dose powder, Miralax 17 gram/dose oral powder  17g prn constipation, Disp: , Rfl:     traMADol (ULTRAM) 50 mg tablet, Take 1 tablet (50 mg total) by mouth every 6 (six) hours as needed for Pain., Disp: 120 tablet, Rfl: 0    venlafaxine (EFFEXOR-XR) 75 MG 24 hr capsule, Take 75 mg by mouth 2 (two) times daily. , Disp: , Rfl: 5    vitamin D 1000 units Tab, Take 2,000 Units by mouth once daily. , Disp: , Rfl:     Patient is not a smoker.    PHYSICAL EXAM    Alert, coop 75 y.o. female patient in no acut distress, not ill appearing.    There were no vitals filed for  this visit.  VS reviewed.  VS BP elevated (see note she sent to Dr. Viramontes yesterday).  CC, nursing note, medications & PMH all reviewed today.    Head:  Normocephalic, atraumatic.    EENT: external ears and nose normal in appearance.             Resp:  Respirations even, unlabored    CV: BP elevated (per pt 12/29     2:45    157/98  83                                         12/29     8:14   146/97  80  Took 10mg    MS:   Moves arms well.    NEURO:  Alert and oriented x 4.  Responds appropriately during interaction.        Skin:  Warm, dry, color good.    Psych:  Responds appropriately throughout the visit.               Relaxed.  Well-groomed    Essential hypertension  -     metoprolol succinate (TOPROL-XL) 25 MG 24 hr tablet; Take 1 tablet (25 mg total) by mouth once daily.  Dispense: 30 tablet; Refill: 11      Pt today presents with continued elevation of her BP despite taking the amlodipine 5 mg tablets, which she doubled x 1.  No symptoms associated with the elevation in the BP  .  Additionally, she has had an a fib episode yesterday and her pulse in is the 80s.    Physical exam shows key findings of alert, coop 75 yr old female in NAD.  Pt is relaxing in a recliner and appears comfortable.    Findings consistent with Essential HTN .    This is a new problem to me.  No work up is planned.        Pt advised to perform comfort measures recommended on patient instruction sheet, which were reviewed at the time of the visit..    If not better in 5-7 days, the patient is advised to call here, PCP office or go for an in-person/follow up evaluation.  If worse or concerns, the patient is advised to call for advise to this office or the PCP office or call [facility] or go to the nearest URGENT CARE or ER.  Explained exam findings, diagnosis and treatment plan to patient.  Questions answered and patient states understanding.

## 2020-12-30 NOTE — PATIENT INSTRUCTIONS
Eating Heart-Healthy Food: Using the DASH Plan    Eating for your heart doesnt have to be hard or boring. You just need to know how to make healthier choices. The DASH eating plan has been developed to help you do just that. DASH stands for Dietary Approaches to Stop Hypertension. It is a plan that has been proven to be healthier for your heart and to lower your risk for high blood pressure. It can also help lower your risk for cancer, heart disease, osteoporosis, and diabetes.  Choosing from each food group  Choose foods from each of the food groups below each day. Try to get the recommended number of servings for each food group. The serving numbers are based on a diet of 2,000 calories a day. Talk to your doctor if youre unsure about your calorie needs. Along with getting the correct servings, the DASH plan also recommends a sodium intake less than 2,300 mg per day.        Grains  Servings: 6 to 8 a day  A serving is:  · 1 slice bread  · 1 ounce dry cereal  · Half a cup cooked rice, pasta or cereal  Best choices: Whole grains and any grains high in fiber. Vegetables  Servings: 4 to 5 a day  A serving is:  · 1 cup raw leafy vegetable  · Half a cup cut-up raw or cooked vegetable  · Half a cup vegetable juice  Best choices: Fresh or frozen vegetables prepared without added salt or fat.   Fruits  Servings: 4 to 5 a day  A serving is:  · 1 medium fruit  · One-quarter cup dried fruit  · Half a cup fresh, frozen, or canned fruit  · Half a cup of 100% fruit juices  Best choices: A variety of fresh fruits of different colors. Whole fruits are a better choice than fruit juices. Low-fat or fat-free dairy  Servings: 2 to 3 a day  A serving is:  · 1 cup milk  · 1 cup yogurt  · One and a half ounces cheese  Best choices: Skim or 1% milk, low-fat or fat-free yogurt or buttermilk, and low-fat cheeses.         Lean meats, poultry, fish  Servings: 6 or fewer a day  A serving is:  · 1 ounce cooked meats, poultry, or fish  · 1  egg  Best choices: Lean poultry and fish. Trim away visible fat. Broil, grill, roast, or boil instead of frying. Remove skin from poultry before eating. Limit how much red meat you eat.  Nuts, seeds, beans  Servings: 4 to 5 a week  A serving is:  · One-third cup nuts (one and a half ounces)  · 2 tablespoons nut butter or seeds  · Half a cup cooked dry beans or legumes  Best choices: Dry roasted nuts with no salt added, lentils, kidney beans, garbanzo beans, and whole pierre beans.   Fats and oils  Servings: 2 to 3 a day  A serving is:  · 1 teaspoon vegetable oil  · 1 teaspoon soft margarine  · 1 tablespoon mayonnaise  · 2 tablespoons salad dressing  Best choices: Nut and vegetable oils (nontropical vegetable oils), such as olive and canola oil. Sweets  Servings: 5 a week or fewer  A serving is:  · 1 tablespoon sugar, maple syrup, or honey  · 1 tablespoon jam or jelly  · 1 half-ounce jelly beans (about 15)  · 1 cup lemonade  Best choices: Dried fruit can be a satisfying sweet. Choose low-fat sweets. And watch your serving sizes!      For more on the DASH eating plan, visit:  www.nhlbi.nih.gov/health/health-topics/topics/dash   Date Last Reviewed: 6/1/2016  © 9906-4981 [employer]. [address] [place] [address], [place] [address]. All rights reserved. This information is not intended as a substitute for professional medical care. Always follow your healthcare professional's instructions.      Eating Heart-Healthy Foods  Eating has a big impact on your heart health. In fact, eating healthier can improve several of your heart risks at once. For instance, it helps you manage weight, cholesterol, and blood pressure. Here are ideas to help you make heart-healthy changes without giving up all the foods and flavors you love.  Getting started  · Talk with your health care provider about eating plans, such as the DASH or Mediterranean diet. You may also be referred to a dietitian.  · Change a few things at a time. Give  yourself time to get used to a few eating changes before adding more.  · Work to create a tasty, healthy eating plan that you can stick to for the rest of your life.    Goals for healthy eating  Below are some tips to improve your eating habits:  · Limit saturated fats and trans fats. Saturated fats raise your levels of cholesterol, so keep these fats to a minimum. They are found in foods such as fatty meats, whole milk, cheese, and palm and coconut oils. Avoid trans fats because they lower good cholesterol as well as raise bad cholesterol. Trans fats are most often found in processed foods.  · Reduce sodium (salt) intake. Eating too much salt may increase your blood pressure. Limit your sodium intake to 2,300 milligrams (mg) per day, or less if your health care provider recommends it. Dining out less often and eating fewer processed foods are two great ways to decrease the amount of salt you consume.  · Managing calories. A calorie is a unit of energy. Your body burns calories for fuel, but if you eat more calories than your body burns, the extras are stored as fat. Your health care provider can help you create a diet plan to manage your calories. This will likely include eating healthier foods as well as exercising regularly. To help you track your progress, keep a diary to record what you eat and how often you exercise.  Choose the right foods  Aim to make these foods staples of your diet. If you have diabetes, you may have different recommendations than what is listed here:  · Fruits and vegetable provide plenty of nutrients without a lot of calories. At meals, fill half your plate with these foods. Split the other half of your plate between whole grains and lean protein.  · Whole grains are high in fiber and rich in vitamins and nutrients. Good choices include whole-wheat bread, pasta, and brown rice.  · Lean proteins give you nutrition with less fat. Good choices include fish, skinless chicken, and  beans.  · Low-fat or nonfat dairy provides nutrients without a lot of fat. Try low-fat or nonfat milk, cheese, or yogurt.  · Healthy fats can be good for you in small amounts. These are unsaturated fats, such as olive oil, nuts, and fish. Try to have at least 2 servings per week of fatty fish such as salmon, sardines, mackerel, rainbow trout, and albacore tuna. These contain omega-3 fatty acids, which are good for your heart. Flaxseed is another source of a heart-healthy fat.  More on heart healthy eating    Read food labels  Healthy eating starts at the grocery store. Be sure to pay attention to food labels on packaged foods. Look for products that are high in fiber and protein, and low in saturated fat, cholesterol, and sodium. Avoid products that contain trans fat. And pay close attention to serving size. For instance, if you plan to eat two servings, double all the numbers on the label.  Prepare food right  A key part of healthy cooking is cutting down on added fat and salt. Look on the internet for lower-fat, lower-sodium recipes. Also, try these tips:  · Remove fat from meat and skin from poultry before cooking.  · Skim fat from the surface of soups and sauces.  · Broil, boil, bake, steam, grill, and microwave food without added fats.  · Choose ingredients that spice up your food without adding calories, fat, or sodium. Try these items: horseradish, hot sauce, lemon, mustard, nonfat salad dressings, and vinegar. For salt-free herbs and spices, try basil, cilantro, cinnamon, pepper, and rosemary.  Date Last Reviewed: 6/25/2015  © 8353-0616 [employer]. [address] [place] [address], [place] [address]. All rights reserved. This information is not intended as a substitute for professional medical care. Always follow your healthcare professional's instructions.      Metoprolol extended-release tablets  What is this medicine?  METOPROLOL (me TOE proe lole) is a beta-blocker. Beta-blockers reduce the workload  on the heart and help it to beat more regularly. This medicine is used to treat high blood pressure and to prevent chest pain. It is also used to after a heart attack and to prevent an additional heart attack from occurring.  How should I use this medicine?  Take this medicine by mouth with a glass of water. Follow the directions on the prescription label. Do not crush or chew. Take this medicine with or immediately after meals. Take your doses at regular intervals. Do not take more medicine than directed. Do not stop taking this medicine suddenly. This could lead to serious heart-related effects.  · When you first start taking this medication, you may feel fatigued and low energy.  This is a slow release form of the medication and that effect will likely wear off.  However, if at any time you are concerned, please talk with Dr. Viramontes.  What should I watch for while using this medicine?  Visit your doctor or health care professional for regular check ups. Contact your doctor right away if your symptoms worsen. Check your blood pressure and pulse rate regularly. Ask your health care professional what your blood pressure and pulse rate should be, and when you should contact them.  You may get drowsy or dizzy. Do not drive, use machinery, or do anything that needs mental alertness until you know how this medicine affects you. Do not sit or stand up quickly, especially if you are an older patient. This reduces the risk of dizzy or fainting spells. Contact your doctor if these symptoms continue. Alcohol may interfere with the effect of this medicine. Avoid alcoholic drinks.  NOTE:This sheet is a summary. It may not cover all possible information. If you have questions about this medicine, talk to your doctor, pharmacist, or health care provider. Copyright© 2017 [employer]

## 2020-12-30 NOTE — Clinical Note
Connor Viramontes MD,  I saw your patient today in [facility]. If you have any questions, please do not hesitate to contact me.  Thank you!  REJI Dye, [facility]

## 2021-01-26 ENCOUNTER — PATIENT MESSAGE (OUTPATIENT)
Dept: FAMILY MEDICINE | Facility: CLINIC | Age: 76
End: 2021-01-26

## 2021-01-26 DIAGNOSIS — I10 ESSENTIAL HYPERTENSION: ICD-10-CM

## 2021-01-27 RX ORDER — AMLODIPINE BESYLATE 5 MG/1
5 TABLET ORAL DAILY
Qty: 90 TABLET | Refills: 3 | Status: SHIPPED | OUTPATIENT
Start: 2021-01-27 | End: 2022-01-31 | Stop reason: SDUPTHER

## 2021-01-27 RX ORDER — METOPROLOL SUCCINATE 25 MG/1
25 TABLET, EXTENDED RELEASE ORAL DAILY
Qty: 90 TABLET | Refills: 3 | Status: SHIPPED | OUTPATIENT
Start: 2021-01-27 | End: 2022-01-31 | Stop reason: SDUPTHER

## 2021-01-28 ENCOUNTER — PATIENT MESSAGE (OUTPATIENT)
Dept: FAMILY MEDICINE | Facility: CLINIC | Age: 76
End: 2021-01-28

## 2021-06-05 ENCOUNTER — PATIENT MESSAGE (OUTPATIENT)
Dept: FAMILY MEDICINE | Facility: CLINIC | Age: 76
End: 2021-06-05

## 2021-06-05 DIAGNOSIS — I10 ESSENTIAL HYPERTENSION: Primary | ICD-10-CM

## 2021-06-05 DIAGNOSIS — E03.4 HYPOTHYROIDISM DUE TO ACQUIRED ATROPHY OF THYROID: ICD-10-CM

## 2021-06-12 ENCOUNTER — PATIENT MESSAGE (OUTPATIENT)
Dept: FAMILY MEDICINE | Facility: CLINIC | Age: 76
End: 2021-06-12

## 2021-06-12 DIAGNOSIS — E83.52 HYPERCALCEMIA: Primary | ICD-10-CM

## 2021-07-19 PROBLEM — D05.11: Status: ACTIVE | Noted: 2021-07-19

## 2021-07-21 ENCOUNTER — PATIENT MESSAGE (OUTPATIENT)
Dept: FAMILY MEDICINE | Facility: CLINIC | Age: 76
End: 2021-07-21

## 2021-07-29 ENCOUNTER — OFFICE VISIT (OUTPATIENT)
Dept: FAMILY MEDICINE | Facility: CLINIC | Age: 76
End: 2021-07-29
Payer: MEDICARE

## 2021-07-29 VITALS
HEIGHT: 68 IN | BODY MASS INDEX: 33.12 KG/M2 | WEIGHT: 218.5 LBS | HEART RATE: 95 BPM | SYSTOLIC BLOOD PRESSURE: 116 MMHG | DIASTOLIC BLOOD PRESSURE: 82 MMHG | OXYGEN SATURATION: 97 %

## 2021-07-29 DIAGNOSIS — M79.7 FIBROMYALGIA: ICD-10-CM

## 2021-07-29 DIAGNOSIS — E03.4 HYPOTHYROIDISM DUE TO ACQUIRED ATROPHY OF THYROID: ICD-10-CM

## 2021-07-29 DIAGNOSIS — E83.52 HYPERCALCEMIA: ICD-10-CM

## 2021-07-29 DIAGNOSIS — I10 ESSENTIAL HYPERTENSION: Primary | ICD-10-CM

## 2021-07-29 DIAGNOSIS — I48.0 PAROXYSMAL ATRIAL FIBRILLATION: ICD-10-CM

## 2021-07-29 DIAGNOSIS — R05.9 COUGH: ICD-10-CM

## 2021-07-29 DIAGNOSIS — E78.2 MIXED HYPERLIPIDEMIA: ICD-10-CM

## 2021-07-29 PROCEDURE — 99999 PR PBB SHADOW E&M-EST. PATIENT-LVL IV: CPT | Mod: PBBFAC,,, | Performed by: INTERNAL MEDICINE

## 2021-07-29 PROCEDURE — 99214 OFFICE O/P EST MOD 30 MIN: CPT | Mod: PBBFAC,PO | Performed by: INTERNAL MEDICINE

## 2021-07-29 PROCEDURE — 99999 PR PBB SHADOW E&M-EST. PATIENT-LVL IV: ICD-10-PCS | Mod: PBBFAC,,, | Performed by: INTERNAL MEDICINE

## 2021-07-29 PROCEDURE — 99214 OFFICE O/P EST MOD 30 MIN: CPT | Mod: S$PBB,,, | Performed by: INTERNAL MEDICINE

## 2021-07-29 PROCEDURE — 99214 PR OFFICE/OUTPT VISIT, EST, LEVL IV, 30-39 MIN: ICD-10-PCS | Mod: S$PBB,,, | Performed by: INTERNAL MEDICINE

## 2021-07-29 RX ORDER — TRAMADOL HYDROCHLORIDE 50 MG/1
50 TABLET ORAL EVERY 6 HOURS PRN
Qty: 120 TABLET | Refills: 0 | Status: SHIPPED | OUTPATIENT
Start: 2021-07-29 | End: 2022-01-31 | Stop reason: SDUPTHER

## 2021-07-31 ENCOUNTER — PATIENT MESSAGE (OUTPATIENT)
Dept: FAMILY MEDICINE | Facility: CLINIC | Age: 76
End: 2021-07-31

## 2021-08-04 ENCOUNTER — PATIENT MESSAGE (OUTPATIENT)
Dept: FAMILY MEDICINE | Facility: CLINIC | Age: 76
End: 2021-08-04

## 2021-08-04 ENCOUNTER — TELEPHONE (OUTPATIENT)
Dept: ENDOCRINOLOGY | Facility: CLINIC | Age: 76
End: 2021-08-04

## 2021-08-04 ENCOUNTER — TELEPHONE (OUTPATIENT)
Dept: FAMILY MEDICINE | Facility: CLINIC | Age: 76
End: 2021-08-04

## 2021-08-04 DIAGNOSIS — E83.52 HYPERCALCEMIA: Primary | ICD-10-CM

## 2021-08-05 ENCOUNTER — PATIENT MESSAGE (OUTPATIENT)
Dept: FAMILY MEDICINE | Facility: CLINIC | Age: 76
End: 2021-08-05

## 2021-08-11 ENCOUNTER — PATIENT MESSAGE (OUTPATIENT)
Dept: FAMILY MEDICINE | Facility: CLINIC | Age: 76
End: 2021-08-11

## 2021-08-18 PROBLEM — D05.11 DUCTAL CARCINOMA IN SITU (DCIS) OF RIGHT BREAST: Status: ACTIVE | Noted: 2021-08-18

## 2021-09-10 ENCOUNTER — TELEPHONE (OUTPATIENT)
Dept: HEMATOLOGY/ONCOLOGY | Facility: CLINIC | Age: 76
End: 2021-09-10

## 2021-09-13 ENCOUNTER — OFFICE VISIT (OUTPATIENT)
Dept: ENDOCRINOLOGY | Facility: CLINIC | Age: 76
End: 2021-09-13
Payer: MEDICARE

## 2021-09-13 VITALS
OXYGEN SATURATION: 99 % | SYSTOLIC BLOOD PRESSURE: 114 MMHG | HEIGHT: 68 IN | DIASTOLIC BLOOD PRESSURE: 74 MMHG | HEART RATE: 73 BPM | BODY MASS INDEX: 32.91 KG/M2 | WEIGHT: 217.13 LBS

## 2021-09-13 DIAGNOSIS — E21.3 HYPERPARATHYROIDISM: Primary | ICD-10-CM

## 2021-09-13 DIAGNOSIS — E83.52 HYPERCALCEMIA: ICD-10-CM

## 2021-09-13 DIAGNOSIS — E03.9 ACQUIRED HYPOTHYROIDISM: ICD-10-CM

## 2021-09-13 PROCEDURE — 99204 PR OFFICE/OUTPT VISIT, NEW, LEVL IV, 45-59 MIN: ICD-10-PCS | Mod: S$PBB,,, | Performed by: INTERNAL MEDICINE

## 2021-09-13 PROCEDURE — 99999 PR PBB SHADOW E&M-EST. PATIENT-LVL V: ICD-10-PCS | Mod: PBBFAC,,, | Performed by: INTERNAL MEDICINE

## 2021-09-13 PROCEDURE — 99999 PR PBB SHADOW E&M-EST. PATIENT-LVL V: CPT | Mod: PBBFAC,,, | Performed by: INTERNAL MEDICINE

## 2021-09-13 PROCEDURE — 99204 OFFICE O/P NEW MOD 45 MIN: CPT | Mod: S$PBB,,, | Performed by: INTERNAL MEDICINE

## 2021-09-13 PROCEDURE — 99215 OFFICE O/P EST HI 40 MIN: CPT | Mod: PBBFAC,PO | Performed by: INTERNAL MEDICINE

## 2021-09-16 ENCOUNTER — PATIENT MESSAGE (OUTPATIENT)
Dept: ENDOCRINOLOGY | Facility: CLINIC | Age: 76
End: 2021-09-16

## 2021-09-21 ENCOUNTER — PATIENT MESSAGE (OUTPATIENT)
Dept: ENDOCRINOLOGY | Facility: CLINIC | Age: 76
End: 2021-09-21

## 2021-10-04 ENCOUNTER — PATIENT MESSAGE (OUTPATIENT)
Dept: HEMATOLOGY/ONCOLOGY | Facility: CLINIC | Age: 76
End: 2021-10-04

## 2021-10-04 ENCOUNTER — TELEPHONE (OUTPATIENT)
Dept: HEMATOLOGY/ONCOLOGY | Facility: CLINIC | Age: 76
End: 2021-10-04

## 2021-10-05 ENCOUNTER — PATIENT MESSAGE (OUTPATIENT)
Dept: FAMILY MEDICINE | Facility: CLINIC | Age: 76
End: 2021-10-05

## 2021-10-06 ENCOUNTER — PATIENT MESSAGE (OUTPATIENT)
Dept: ENDOCRINOLOGY | Facility: CLINIC | Age: 76
End: 2021-10-06

## 2021-10-24 ENCOUNTER — PATIENT MESSAGE (OUTPATIENT)
Dept: FAMILY MEDICINE | Facility: CLINIC | Age: 76
End: 2021-10-24
Payer: MEDICARE

## 2021-10-24 DIAGNOSIS — T75.3XXA MOTION SICKNESS, INITIAL ENCOUNTER: Primary | ICD-10-CM

## 2021-10-25 ENCOUNTER — PATIENT MESSAGE (OUTPATIENT)
Dept: FAMILY MEDICINE | Facility: CLINIC | Age: 76
End: 2021-10-25
Payer: MEDICARE

## 2021-10-25 RX ORDER — SCOLOPAMINE TRANSDERMAL SYSTEM 1 MG/1
1 PATCH, EXTENDED RELEASE TRANSDERMAL
Qty: 7 PATCH | Refills: 0 | Status: SHIPPED | OUTPATIENT
Start: 2021-10-25 | End: 2021-12-08 | Stop reason: CLARIF

## 2021-12-15 PROBLEM — C80.1 PAPILLARY CARCINOMA: Status: ACTIVE | Noted: 2021-12-15

## 2021-12-20 ENCOUNTER — PATIENT MESSAGE (OUTPATIENT)
Dept: FAMILY MEDICINE | Facility: CLINIC | Age: 76
End: 2021-12-20
Payer: MEDICARE

## 2021-12-20 RX ORDER — PREGABALIN 100 MG/1
100 CAPSULE ORAL 2 TIMES DAILY
Qty: 180 CAPSULE | Refills: 0 | Status: SHIPPED | OUTPATIENT
Start: 2021-12-20 | End: 2022-01-10

## 2022-01-10 RX ORDER — PREGABALIN 100 MG/1
CAPSULE ORAL
Qty: 180 CAPSULE | Refills: 0 | Status: SHIPPED | OUTPATIENT
Start: 2022-01-10 | End: 2022-01-31 | Stop reason: SDUPTHER

## 2022-01-24 NOTE — TELEPHONE ENCOUNTER
Assessment and Plan:     Problem List Items Addressed This Visit        Other    BMI 25 0-25 9,adult      Other Visit Diagnoses     Health care maintenance    -  Primary    Screen for colon cancer            BMI Counseling: Body mass index is 25 67 kg/m²  The BMI is above normal  Nutrition recommendations include decreasing portion sizes and encouraging healthy choices of fruits and vegetables  Exercise recommendations include moderate physical activity 150 minutes/week  No pharmacotherapy was ordered  Rationale for BMI follow-up plan is due to patient being overweight or obese  Depression Screening and Follow-up Plan: Patient was screened for depression during today's encounter  They screened negative with a PHQ-2 score of 0  Preventive health issues were discussed with patient, and age appropriate screening tests were ordered as noted in patient's After Visit Summary  Personalized health advice and appropriate referrals for health education or preventive services given if needed, as noted in patient's After Visit Summary       History of Present Illness:     Patient presents for Medicare Annual Wellness visit    Patient Care Team:  Karen Peterson DO as PCP - Kirsten Allan MD as PCP - [address] [place] [address] (RTE)  Diamond Powell [facility]  Karen Peterson DO     Problem List:     Patient Active Problem List   Diagnosis    Contusion of right chest wall    Skin lesion    Essential hypertension    Impaired fasting glucose    Traumatic complete tear of right rotator cuff    Erectile dysfunction due to arterial insufficiency    Hip pain    BMI 25 0-25 9,adult      Past Medical and Surgical History:     Past Medical History:   Diagnosis Date    Hypertension     Rotator cuff tear, left     Rotator cuff tear, right      Past Surgical History:   Procedure Laterality Date    BASAL CELL CARCINOMA EXCISION      DENTAL SURGERY      HIP SURGERY Right 02/13/2019    MOHS No new care gaps identified.  Powered by [employer]. Reference number: [id number].   1/09/2022 11:31:33 AM CST   SURGERY  03/26/2019    on nose with skin graft from left ear    WISDOM TOOTH EXTRACTION      WRIST SURGERY Right     Tendon and muscle repair      Family History:     Family History   Problem Relation Age of Onset    Diabetes Mother     Cancer Other       Social History:     Social History     Socioeconomic History    Marital status:      Spouse name: None    Number of children: None    Years of education: None    Highest education level: None   Occupational History    None   Tobacco Use    Smoking status: Never Smoker    Smokeless tobacco: Never Used   Substance and Sexual Activity    Alcohol use: Yes     Comment: special occasions    Drug use: No    Sexual activity: Yes   Other Topics Concern    None   Social History Narrative    Drinks coffee     Social Determinants of Health     Financial Resource Strain: Not on file   Food Insecurity: Not on file   Transportation Needs: Not on file   Physical Activity: Not on file   Stress: Not on file   Social Connections: Not on file   Intimate Partner Violence: Not on file   Housing Stability: Not on file      Medications and Allergies:     Current Outpatient Medications   Medication Sig Dispense Refill    amLODIPine (NORVASC) 5 mg tablet TAKE 1 TABLET BY MOUTH EVERY DAY 90 tablet 0    diclofenac (VOLTAREN) 75 mg EC tablet diclofenac sodium 75 mg tablet,delayed release      Garlic 189 MG TABS Take by mouth      Multiple Vitamin (multivitamin) tablet Take 1 tablet by mouth daily      meloxicam (MOBIC) 15 mg tablet meloxicam 15 mg tablet (Patient not taking: Reported on 1/24/2022)      traMADol (ULTRAM) 50 mg tablet Take 50 mg by mouth 2 (two) times a day (Patient not taking: Reported on 1/24/2022 )       No current facility-administered medications for this visit  Allergies   Allergen Reactions    Tetracyclines & Related Anaphylaxis     Sores appeared in mouth and genitals    Demerol [Meperidine] Vomiting      Immunizations:        There is no immunization history on file for this patient  Health Maintenance:         Topic Date Due    Colorectal Cancer Screening  Never done    Hepatitis C Screening  02/23/2022 (Originally 1949)         Topic Date Due    COVID-19 Vaccine (1) Never done    Pneumococcal Vaccine: 65+ Years (1 of 1 - PPSV23) Never done    Influenza Vaccine (1) Never done      Medicare Health Risk Assessment:     /80   Pulse 80   Temp (!) 97 °F (36 1 °C)   Resp 16   Ht 6' 1" (1 854 m)   Wt 88 3 kg (194 lb 9 6 oz)   SpO2 98%   BMI 25 67 kg/m²      Gertrude Yao is here for his Subsequent Wellness visit  Last Medicare Wellness visit information reviewed, patient interviewed and updates made to the record today  Health Risk Assessment:   Patient rates overall health as very good  Patient feels that their physical health rating is same  Patient is very satisfied with their life  Eyesight was rated as same  Hearing was rated as same  Patient feels that their emotional and mental health rating is much better  Patients states they are never, rarely angry  Patient states they are never, rarely unusually tired/fatigued  Pain experienced in the last 7 days has been a lot  Patient's pain rating has been 9/10  Patient states that he has experienced no weight loss or gain in last 6 months  the pain depends on the day    Depression Screening:   PHQ-2 Score: 0      Fall Risk Screening: In the past year, patient has experienced: no history of falling in past year      Home Safety:  Patient does not have trouble with stairs inside or outside of their home  Patient has working smoke alarms and has working carbon monoxide detector  Home safety hazards include: none  Nutrition:   Current diet is Regular  Medications:   Patient is currently taking over-the-counter supplements  OTC medications include: see medication list  Patient is able to manage medications       Activities of Daily Living (ADLs)/Instrumental Activities of Daily Living (IADLs):   Walk and transfer into and out of bed and chair?: Yes  Dress and groom yourself?: Yes    Bathe or shower yourself?: Yes    Feed yourself? Yes  Do your laundry/housekeeping?: Yes  Manage your money, pay your bills and track your expenses?: Yes  Make your own meals?: Yes    Do your own shopping?: Yes    Advance Care Planning:   Living will: No    Durable POA for healthcare: No    Advanced directive counseling given: Yes      Cognitive Screening:   Provider or family/friend/caregiver concerned regarding cognition?: No    PREVENTIVE SCREENINGS      Cardiovascular Screening:    General: Screening Current      Colorectal Cancer Screening:       Due for: Colonoscopy - Low Risk      Prostate Cancer Screening:      Due for: PSA      Osteoporosis Screening:    General: Screening Not Indicated      Abdominal Aortic Aneurysm (AAA) Screening:    Risk factors include: age between 73-69 yo        Lung Cancer Screening:     General: Screening Not Indicated      Hepatitis C Screening:    General: Screening Current    Screening, Brief Intervention, and Referral to Treatment (SBIRT)    Screening  Typical number of drinks in a day: 0  Typical number of drinks in a week: 0  Interpretation: Low risk drinking behavior      Single Item Drug Screening:  How often have you used an illegal drug (including marijuana) or a prescription medication for non-medical reasons in the past year? never    Single Item Drug Screen Score: 0  Interpretation: Negative screen for possible drug use disorder      Marcel Son DO

## 2022-01-27 ENCOUNTER — PATIENT MESSAGE (OUTPATIENT)
Dept: FAMILY MEDICINE | Facility: CLINIC | Age: 77
End: 2022-01-27
Payer: MEDICARE

## 2022-01-27 DIAGNOSIS — E78.2 MIXED HYPERLIPIDEMIA: Primary | ICD-10-CM

## 2022-01-31 ENCOUNTER — OFFICE VISIT (OUTPATIENT)
Dept: FAMILY MEDICINE | Facility: CLINIC | Age: 77
End: 2022-01-31
Payer: MEDICARE

## 2022-01-31 VITALS
HEART RATE: 80 BPM | BODY MASS INDEX: 31.91 KG/M2 | WEIGHT: 210.56 LBS | SYSTOLIC BLOOD PRESSURE: 110 MMHG | DIASTOLIC BLOOD PRESSURE: 70 MMHG | OXYGEN SATURATION: 97 % | HEIGHT: 68 IN

## 2022-01-31 DIAGNOSIS — M79.7 FIBROMYALGIA: ICD-10-CM

## 2022-01-31 DIAGNOSIS — E03.9 HYPOTHYROIDISM, UNSPECIFIED TYPE: ICD-10-CM

## 2022-01-31 DIAGNOSIS — Z00.00 WELLNESS EXAMINATION: Primary | ICD-10-CM

## 2022-01-31 DIAGNOSIS — E78.2 MIXED HYPERLIPIDEMIA: ICD-10-CM

## 2022-01-31 DIAGNOSIS — I10 ESSENTIAL HYPERTENSION: ICD-10-CM

## 2022-01-31 DIAGNOSIS — E03.4 HYPOTHYROIDISM DUE TO ACQUIRED ATROPHY OF THYROID: ICD-10-CM

## 2022-01-31 DIAGNOSIS — I48.0 PAROXYSMAL ATRIAL FIBRILLATION: ICD-10-CM

## 2022-01-31 DIAGNOSIS — K21.9 GASTROESOPHAGEAL REFLUX DISEASE WITHOUT ESOPHAGITIS: ICD-10-CM

## 2022-01-31 PROCEDURE — 99214 OFFICE O/P EST MOD 30 MIN: CPT | Mod: S$PBB,,, | Performed by: INTERNAL MEDICINE

## 2022-01-31 PROCEDURE — 99213 OFFICE O/P EST LOW 20 MIN: CPT | Mod: PBBFAC,PO | Performed by: INTERNAL MEDICINE

## 2022-01-31 PROCEDURE — 99999 PR PBB SHADOW E&M-EST. PATIENT-LVL III: ICD-10-PCS | Mod: PBBFAC,,, | Performed by: INTERNAL MEDICINE

## 2022-01-31 PROCEDURE — 99214 PR OFFICE/OUTPT VISIT, EST, LEVL IV, 30-39 MIN: ICD-10-PCS | Mod: S$PBB,,, | Performed by: INTERNAL MEDICINE

## 2022-01-31 PROCEDURE — 99999 PR PBB SHADOW E&M-EST. PATIENT-LVL III: CPT | Mod: PBBFAC,,, | Performed by: INTERNAL MEDICINE

## 2022-01-31 RX ORDER — METOPROLOL SUCCINATE 25 MG/1
25 TABLET, EXTENDED RELEASE ORAL DAILY
Qty: 90 TABLET | Refills: 3 | Status: SHIPPED | OUTPATIENT
Start: 2022-01-31 | End: 2023-03-19 | Stop reason: SDUPTHER

## 2022-01-31 RX ORDER — TRAMADOL HYDROCHLORIDE 50 MG/1
50 TABLET ORAL EVERY 6 HOURS PRN
Qty: 90 TABLET | Refills: 0 | Status: SHIPPED | OUTPATIENT
Start: 2022-01-31 | End: 2023-04-24 | Stop reason: SDUPTHER

## 2022-01-31 RX ORDER — VENLAFAXINE HYDROCHLORIDE 75 MG/1
75 CAPSULE, EXTENDED RELEASE ORAL 2 TIMES DAILY
Qty: 180 CAPSULE | Refills: 3 | Status: SHIPPED | OUTPATIENT
Start: 2022-01-31 | End: 2022-09-13 | Stop reason: SDUPTHER

## 2022-01-31 RX ORDER — PREGABALIN 100 MG/1
100 CAPSULE ORAL 2 TIMES DAILY
Qty: 180 CAPSULE | Refills: 3 | Status: SHIPPED | OUTPATIENT
Start: 2022-01-31 | End: 2022-09-14

## 2022-01-31 RX ORDER — AMLODIPINE BESYLATE 5 MG/1
5 TABLET ORAL DAILY
Qty: 90 TABLET | Refills: 3 | Status: SHIPPED | OUTPATIENT
Start: 2022-01-31 | End: 2023-03-19 | Stop reason: SDUPTHER

## 2022-01-31 RX ORDER — LEVOTHYROXINE SODIUM 112 UG/1
112 TABLET ORAL DAILY
Qty: 90 TABLET | Refills: 3 | Status: SHIPPED | OUTPATIENT
Start: 2022-01-31 | End: 2022-04-05

## 2022-01-31 RX ORDER — AMITRIPTYLINE HYDROCHLORIDE 100 MG/1
100 TABLET ORAL NIGHTLY
Qty: 90 TABLET | Refills: 3 | Status: SHIPPED | OUTPATIENT
Start: 2022-01-31 | End: 2023-02-13 | Stop reason: SDUPTHER

## 2022-01-31 RX ORDER — OMEPRAZOLE 40 MG/1
40 CAPSULE, DELAYED RELEASE ORAL DAILY
Qty: 90 CAPSULE | Refills: 3 | Status: SHIPPED | OUTPATIENT
Start: 2022-01-31 | End: 2023-03-19 | Stop reason: SDUPTHER

## 2022-01-31 NOTE — PROGRESS NOTES
Patient ID: Lauren Junior     Chief Complaint:   Chief Complaint   Patient presents with    Review labs    Medication Refill        HPI:  Annual exam and doing very well overall.    Since our last office visit, she has been diagnosed with breast cancer and elected to get a bilateral mastectomy rather than undergo extensive chemotherapy and radiation.  I am happy to report that she is cancer free and doing very well.    We checked her cholesterol recently and it is getting much better with dietary changes.  She would rather not start a statin at this point and I agree so we will monitor this over time.  Also she has lost about 10-15 lb of the preceding 6 months.    She is up-to-date with all of her health maintenance and does plan to get the COVID booster in the near future.  She had to delay this because she had COVID 2 weeks ago but seems a fully recovered.    She does need refills on all of her medications and I will do that for her because her regular neurologist to still out on medical leave.    She will be following up with Endocrinology for the hypercalcemia in the near future as well.    Review of Systems   Constitutional: Negative.    HENT: Negative.    Eyes: Negative.    Respiratory: Negative.    Cardiovascular: Negative.    Gastrointestinal: Negative.    Endocrine: Negative.    Genitourinary: Negative.    Musculoskeletal: Negative.    Skin: Negative.    Allergic/Immunologic: Negative.    Neurological: Negative.    Hematological: Negative.    Psychiatric/Behavioral: Negative.           Objective:      Physical Exam   Physical Exam  Vitals and nursing note reviewed.   Constitutional:       Appearance: Normal appearance. She is well-developed.   HENT:      Head: Normocephalic and atraumatic.      Nose: Nose normal.   Eyes:      Extraocular Movements: Extraocular movements intact.      Conjunctiva/sclera: Conjunctivae normal.      Pupils: Pupils are equal, round, and reactive to light.   Cardiovascular:       "Rate and Rhythm: Normal rate and regular rhythm.      Pulses: Normal pulses.      Heart sounds: Normal heart sounds.   Pulmonary:      Effort: Pulmonary effort is normal.      Breath sounds: Normal breath sounds.   Abdominal:      General: Bowel sounds are normal.      Palpations: Abdomen is soft.   Musculoskeletal:         General: Normal range of motion.      Cervical back: Normal range of motion and neck supple.   Skin:     General: Skin is warm and dry.      Capillary Refill: Capillary refill takes less than 2 seconds.   Neurological:      General: No focal deficit present.      Mental Status: She is alert and oriented to person, place, and time.   Psychiatric:         Mood and Affect: Mood normal.         Behavior: Behavior normal.         Thought Content: Thought content normal.         Judgment: Judgment normal.            Vitals:   Vitals:    01/31/22 0913   BP: 110/70   Pulse: 80   SpO2: 97%   Weight: 95.5 kg (210 lb 8.6 oz)   Height: 5' 8" (1.727 m)          Current Outpatient Medications:     biotin 5,000 mcg TbDL, Take 1 tablet by mouth Daily., Disp: , Rfl:     multivitamin Tab, Take 1 tablet by mouth once daily. , Disp: , Rfl:     polyethylene glycol (GLYCOLAX) 17 gram/dose powder, Take 17 g by mouth daily as needed. , Disp: , Rfl:     vitamin D 1000 units Tab, Take 2,000 Units by mouth every evening. , Disp: , Rfl:     amitriptyline (ELAVIL) 100 MG tablet, Take 1 tablet (100 mg total) by mouth every evening., Disp: 90 tablet, Rfl: 3    amLODIPine (NORVASC) 5 MG tablet, Take 1 tablet (5 mg total) by mouth once daily., Disp: 90 tablet, Rfl: 3    levothyroxine (SYNTHROID) 112 MCG tablet, Take 1 tablet (112 mcg total) by mouth once daily., Disp: 90 tablet, Rfl: 3    metoprolol succinate (TOPROL-XL) 25 MG 24 hr tablet, Take 1 tablet (25 mg total) by mouth once daily., Disp: 90 tablet, Rfl: 3    omeprazole (PRILOSEC) 40 MG capsule, Take 1 capsule (40 mg total) by mouth once daily., Disp: 90 capsule, " Rfl: 3    pregabalin (LYRICA) 100 MG capsule, Take 1 capsule (100 mg total) by mouth 2 (two) times daily., Disp: 180 capsule, Rfl: 3    traMADoL (ULTRAM) 50 mg tablet, Take 1 tablet (50 mg total) by mouth every 6 (six) hours as needed for Pain., Disp: 90 tablet, Rfl: 0    venlafaxine (EFFEXOR-XR) 75 MG 24 hr capsule, Take 1 capsule (75 mg total) by mouth 2 (two) times daily., Disp: 180 capsule, Rfl: 3   Assessment:       Patient Active Problem List    Diagnosis Date Noted    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    Hyperparathyroidism 09/13/2021    Ductal carcinoma in situ (DCIS) of right breast 08/18/2021    Hypercalcemia 07/29/2021    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 07/19/2021    Advanced care planning/counseling discussion 12/04/2020    Essential hypertension 12/04/2020    Binge eating disorder 11/06/2020    Mixed hyperlipidemia 09/30/2019    Fibromyalgia     Deficiency of clotting factor     History of hepatitis C     Migraines     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    Diagnoses and all orders for this visit:    Lauren was seen today for review labs and medication refill.    Diagnoses and all orders for this visit:    Wellness examination    Hypothyroidism due to acquired atrophy of thyroid  -     levothyroxine (SYNTHROID) 112 MCG tablet; Take 1 tablet (112 mcg total) by mouth once daily.  Per Endo     Fibromyalgia  -     amitriptyline (ELAVIL) 100 MG tablet; Take 1 tablet (100 mg total) by mouth every evening.  -     pregabalin (LYRICA) 100 MG capsule; Take 1 capsule (100 mg total) by mouth 2 (two) times daily.  -      traMADoL (ULTRAM) 50 mg tablet; Take 1 tablet (50 mg total) by mouth every 6 (six) hours as needed for Pain.  -     venlafaxine (EFFEXOR-XR) 75 MG 24 hr capsule; Take 1 capsule (75 mg total) by mouth 2 (two) times daily.  Stable    Essential hypertension  -     amLODIPine (NORVASC) 5 MG tablet; Take 1 tablet (5 mg total) by mouth once daily.  -     metoprolol succinate (TOPROL-XL) 25 MG 24 hr tablet; Take 1 tablet (25 mg total) by mouth once daily.  Controlled    Gastroesophageal reflux disease without esophagitis  -     omeprazole (PRILOSEC) 40 MG capsule; Take 1 capsule (40 mg total) by mouth once daily.  Controlled    Mixed hyperlipidemia  Improving     Hypothyroidism, unspecified type  Per Endo     Paroxysmal atrial fibrillation  Controlled

## 2022-03-07 ENCOUNTER — OFFICE VISIT (OUTPATIENT)
Dept: ENDOCRINOLOGY | Facility: CLINIC | Age: 77
End: 2022-03-07
Payer: MEDICARE

## 2022-03-07 VITALS
HEIGHT: 68 IN | HEART RATE: 67 BPM | OXYGEN SATURATION: 99 % | SYSTOLIC BLOOD PRESSURE: 118 MMHG | BODY MASS INDEX: 31.54 KG/M2 | DIASTOLIC BLOOD PRESSURE: 76 MMHG | WEIGHT: 208.13 LBS

## 2022-03-07 DIAGNOSIS — E03.9 ACQUIRED HYPOTHYROIDISM: ICD-10-CM

## 2022-03-07 DIAGNOSIS — I10 ESSENTIAL HYPERTENSION: ICD-10-CM

## 2022-03-07 DIAGNOSIS — E66.09 CLASS 1 OBESITY DUE TO EXCESS CALORIES WITH SERIOUS COMORBIDITY AND BODY MASS INDEX (BMI) OF 31.0 TO 31.9 IN ADULT: ICD-10-CM

## 2022-03-07 DIAGNOSIS — E83.52 HYPERCALCEMIA: ICD-10-CM

## 2022-03-07 DIAGNOSIS — E04.9 GOITER: ICD-10-CM

## 2022-03-07 DIAGNOSIS — E21.3 HYPERPARATHYROIDISM: Primary | ICD-10-CM

## 2022-03-07 PROBLEM — S52.90XA RADIUS FRACTURE: Status: ACTIVE | Noted: 2022-03-07

## 2022-03-07 PROBLEM — E66.811 CLASS 1 OBESITY DUE TO EXCESS CALORIES WITH SERIOUS COMORBIDITY AND BODY MASS INDEX (BMI) OF 31.0 TO 31.9 IN ADULT: Status: ACTIVE | Noted: 2022-03-07

## 2022-03-07 PROCEDURE — 99214 PR OFFICE/OUTPT VISIT, EST, LEVL IV, 30-39 MIN: ICD-10-PCS | Mod: S$PBB,,, | Performed by: INTERNAL MEDICINE

## 2022-03-07 PROCEDURE — 99213 OFFICE O/P EST LOW 20 MIN: CPT | Mod: PBBFAC,PO | Performed by: INTERNAL MEDICINE

## 2022-03-07 PROCEDURE — 99214 OFFICE O/P EST MOD 30 MIN: CPT | Mod: S$PBB,,, | Performed by: INTERNAL MEDICINE

## 2022-03-07 PROCEDURE — 99999 PR PBB SHADOW E&M-EST. PATIENT-LVL III: ICD-10-PCS | Mod: PBBFAC,,, | Performed by: INTERNAL MEDICINE

## 2022-03-07 PROCEDURE — 99999 PR PBB SHADOW E&M-EST. PATIENT-LVL III: CPT | Mod: PBBFAC,,, | Performed by: INTERNAL MEDICINE

## 2022-03-07 RX ORDER — CELECOXIB 200 MG/1
200 CAPSULE ORAL DAILY
COMMUNITY
Start: 2022-01-04 | End: 2022-09-13

## 2022-03-07 NOTE — ASSESSMENT & PLAN NOTE
Pt plans to have surgery soon. Discussed this is technically a fragility fracture. F/u BMD on other wrist. Discussed indication for tx of osteoporosis.

## 2022-03-07 NOTE — ASSESSMENT & PLAN NOTE
Overall clinically euthyroid. Most recent TSH suppressed. Hold biotin for 1 week then recheck thyroid hormone. Adjust dose prn.

## 2022-03-07 NOTE — ASSESSMENT & PLAN NOTE
? Primary hyperparathyroidism    Last vit D 51  24 urine calcium Ca/cr ratio- 0.025 (Volume 3.6L, U ca 8.1, U cr 28, Sca 10.1, Scr 0.87)  Last DXA 12/2020-wnls  Pt with recent fracture of radius. Seemingly this is a fragility fx. Discussed indication for tx of osteoporosis and now reason to pursue parathyroidectomy. Check dexa of other wrist.  Most recent ca wnls  Kidney US for asymptomatic stones was nl 9/2021    Discussed indications for surgery for primary hyperparathyroidism. Pt notes she is overwhelmed and is not willing to consider surgery at this point.     Avoid dehydration, excessive calcium supplementation and meds (HCTZ)  that can worsen hypercalcemia.

## 2022-03-07 NOTE — ASSESSMENT & PLAN NOTE
BP controlled. Continue current meds. Pt to notify PCP if BP consistently more than 140/90. Avoid meds like HCTZ which can affect calcium.

## 2022-03-07 NOTE — ASSESSMENT & PLAN NOTE
Recommend baseline thyroid US. Pt not interested at this time but will consider after her wrist surgery.

## 2022-03-10 ENCOUNTER — PATIENT MESSAGE (OUTPATIENT)
Dept: FAMILY MEDICINE | Facility: CLINIC | Age: 77
End: 2022-03-10
Payer: MEDICARE

## 2022-03-11 ENCOUNTER — OFFICE VISIT (OUTPATIENT)
Dept: FAMILY MEDICINE | Facility: CLINIC | Age: 77
End: 2022-03-11
Payer: MEDICARE

## 2022-03-11 VITALS — OXYGEN SATURATION: 97 % | HEART RATE: 69 BPM | SYSTOLIC BLOOD PRESSURE: 128 MMHG | DIASTOLIC BLOOD PRESSURE: 86 MMHG

## 2022-03-11 DIAGNOSIS — I10 ESSENTIAL HYPERTENSION: ICD-10-CM

## 2022-03-11 DIAGNOSIS — K21.9 GASTROESOPHAGEAL REFLUX DISEASE WITHOUT ESOPHAGITIS: ICD-10-CM

## 2022-03-11 DIAGNOSIS — E78.2 MIXED HYPERLIPIDEMIA: ICD-10-CM

## 2022-03-11 DIAGNOSIS — E21.3 HYPERPARATHYROIDISM: ICD-10-CM

## 2022-03-11 DIAGNOSIS — R79.1 ABNORMAL COAGULATION PROFILE: ICD-10-CM

## 2022-03-11 DIAGNOSIS — I48.91 ATRIAL FIBRILLATION, UNSPECIFIED TYPE: ICD-10-CM

## 2022-03-11 DIAGNOSIS — Z86.19 HISTORY OF HEPATITIS C: ICD-10-CM

## 2022-03-11 DIAGNOSIS — M79.7 FIBROMYALGIA: ICD-10-CM

## 2022-03-11 DIAGNOSIS — D05.11: ICD-10-CM

## 2022-03-11 DIAGNOSIS — E03.9 ACQUIRED HYPOTHYROIDISM: ICD-10-CM

## 2022-03-11 DIAGNOSIS — Z01.818 PRE-OP EXAM: Primary | ICD-10-CM

## 2022-03-11 DIAGNOSIS — D68.4: ICD-10-CM

## 2022-03-11 PROCEDURE — 99214 OFFICE O/P EST MOD 30 MIN: CPT | Mod: S$PBB,,, | Performed by: PHYSICIAN ASSISTANT

## 2022-03-11 PROCEDURE — 99999 PR PBB SHADOW E&M-EST. PATIENT-LVL IV: ICD-10-PCS | Mod: PBBFAC,,, | Performed by: PHYSICIAN ASSISTANT

## 2022-03-11 PROCEDURE — 99214 PR OFFICE/OUTPT VISIT, EST, LEVL IV, 30-39 MIN: ICD-10-PCS | Mod: S$PBB,,, | Performed by: PHYSICIAN ASSISTANT

## 2022-03-11 PROCEDURE — 99214 OFFICE O/P EST MOD 30 MIN: CPT | Mod: PBBFAC,PO | Performed by: PHYSICIAN ASSISTANT

## 2022-03-11 PROCEDURE — 99999 PR PBB SHADOW E&M-EST. PATIENT-LVL IV: CPT | Mod: PBBFAC,,, | Performed by: PHYSICIAN ASSISTANT

## 2022-03-11 NOTE — PROGRESS NOTES
Subjective:       Patient ID: Lauren Junior is a 76 y.o. female.    Chief Complaint: Pre-op Exam    HPI  Review of Systems      Objective:      Physical Exam    Assessment:       Problem List Items Addressed This Visit    None         Plan:       ***

## 2022-03-11 NOTE — PROGRESS NOTES
Patient ID: Lauren Junior is a 76 y.o. female.    Chief Complaint: Pre-op Exam      Lauren Junior is in the office for pre-op exam for ORIF left wrist on 3/15/22 with Dr. Lindo .    HPI    Pt is new to me, PCP Dr. Viramontes.     Pt is a 76 year old female with migraines, binge eating disorder, A-fib, HLD, HTN, hx of hep C treated successfully, hx of DCIS right breast s/p double mastectomy, hypothyroidism, hyperparathyroidism with hx of hypercalcemia, GERD, fibromyalgia, factor 11 deficiency. She presents today for a pre-op exam. Doing well today other than left wrist pain. Pt is getting [facility] EKG [facility] today.     Past Medical History:   Diagnosis Date    Acid reflux 9/9/2015    Arthritis     Atrial fibrillation 9/9/2015    Breast cancer 2021    Papillary Ductal    Deficiency of clotting factor     Factor 11    Elevated blood pressure 9/9/2015    Encounter for blood transfusion 1967    Fibromyalgia     History of hepatitis C     s/p Interferon & Ribavarin     Hypothyroidism 9/9/2015    Kidney stones     Migraines     Thoracic outlet syndrome     s/p B rib resections                Current Outpatient Medications:     amitriptyline (ELAVIL) 100 MG tablet, Take 1 tablet (100 mg total) by mouth every evening., Disp: 90 tablet, Rfl: 3    amLODIPine (NORVASC) 5 MG tablet, Take 1 tablet (5 mg total) by mouth once daily., Disp: 90 tablet, Rfl: 3    biotin 5,000 mcg TbDL, Take 1 tablet by mouth Daily., Disp: , Rfl:     celecoxib (CELEBREX) 200 MG capsule, Take 200 mg by mouth once daily., Disp: , Rfl:     levothyroxine (SYNTHROID) 112 MCG tablet, Take 1 tablet (112 mcg total) by mouth once daily., Disp: 90 tablet, Rfl: 3    metoprolol succinate (TOPROL-XL) 25 MG 24 hr tablet, Take 1 tablet (25 mg total) by mouth once daily., Disp: 90 tablet, Rfl: 3    multivitamin Tab, Take 1 tablet by mouth once daily. , Disp: , Rfl:     omeprazole (PRILOSEC) 40 MG capsule, Take 1 capsule (40 mg total) by mouth once  daily., Disp: 90 capsule, Rfl: 3    polyethylene glycol (GLYCOLAX) 17 gram/dose powder, Take 17 g by mouth daily as needed. , Disp: , Rfl:     pregabalin (LYRICA) 100 MG capsule, Take 1 capsule (100 mg total) by mouth 2 (two) times daily., Disp: 180 capsule, Rfl: 3    traMADoL (ULTRAM) 50 mg tablet, Take 1 tablet (50 mg total) by mouth every 6 (six) hours as needed for Pain., Disp: 90 tablet, Rfl: 0    venlafaxine (EFFEXOR-XR) 75 MG 24 hr capsule, Take 1 capsule (75 mg total) by mouth 2 (two) times daily., Disp: 180 capsule, Rfl: 3    vitamin D 1000 units Tab, Take 2,000 Units by mouth every evening. , Disp: , Rfl:     The 10-year ASCVD risk score (Risa CLARK Jr., et al., 2013) is: 19.1%    Values used to calculate the score:      Age: 76 years      Sex: Female      Is Non- : No      Diabetic: No      Tobacco smoker: No      Systolic Blood Pressure: 128 mmHg      Is BP treated: Yes      HDL Cholesterol: 69 mg/dL      Total Cholesterol: 215 mg/dL     Wt Readings from Last 3 Encounters:   03/07/22 94.4 kg (208 lb 1.8 oz)   03/02/22 95.5 kg (210 lb 8.6 oz)   01/31/22 95.5 kg (210 lb 8.6 oz)     Temp Readings from Last 3 Encounters:   03/02/22 98 °F (36.7 °C) (Oral)   12/15/21 97.9 °F (36.6 °C)   12/09/21 98 °F (36.7 °C) (Skin)     BP Readings from Last 3 Encounters:   03/11/22 128/86   03/07/22 118/76   03/02/22 120/76     Pulse Readings from Last 3 Encounters:   03/11/22 69   03/07/22 67   03/02/22 78     Resp Readings from Last 3 Encounters:   03/02/22 16   12/15/21 18   12/09/21 18     PF Readings from Last 3 Encounters:   No data found for PF     SpO2 Readings from Last 3 Encounters:   03/11/22 97%   03/07/22 99%   03/02/22 98%        No results found for: HGBA1C  Lab Results   Component Value Date    LDLCALC 134.2 01/28/2022    CREATININE 0.70 03/02/2022       Review of Systems   Constitutional: Negative for chills and fever.   HENT: Negative for postnasal drip, sinus pain, sneezing, sore  throat and trouble swallowing.    Respiratory: Negative for cough, choking, shortness of breath and wheezing.    Cardiovascular: Negative for chest pain and palpitations.   Gastrointestinal: Negative for abdominal pain, constipation, diarrhea, nausea and vomiting.   Genitourinary: Negative for dyspareunia, dysuria, frequency and urgency.   Musculoskeletal: Positive for arthralgias (left wrist pain). Negative for back pain.   Neurological: Negative for dizziness, tremors, seizures, speech difficulty, light-headedness and headaches.           Objective:      Physical Exam  Vitals and nursing note reviewed.   Constitutional:       General: She is not in acute distress.     Appearance: Normal appearance. She is not ill-appearing, toxic-appearing or diaphoretic.   HENT:      Head: Normocephalic and atraumatic.      Right Ear: Tympanic membrane, ear canal and external ear normal. There is no impacted cerumen.      Left Ear: Tympanic membrane, ear canal and external ear normal. There is no impacted cerumen.      Nose: Nose normal. No congestion or rhinorrhea.      Mouth/Throat:      Mouth: Mucous membranes are moist.      Pharynx: Oropharynx is clear. No oropharyngeal exudate or posterior oropharyngeal erythema.   Eyes:      General: No scleral icterus.        Right eye: No discharge.         Left eye: No discharge.      Extraocular Movements: Extraocular movements intact.      Conjunctiva/sclera: Conjunctivae normal.      Pupils: Pupils are equal, round, and reactive to light.   Neck:      Thyroid: Thyromegaly (pt states getting worked up by endo) present.   Cardiovascular:      Rate and Rhythm: Normal rate and regular rhythm.      Pulses: Normal pulses.      Heart sounds: Normal heart sounds. No murmur heard.    No friction rub. No gallop.   Pulmonary:      Effort: Pulmonary effort is normal. No respiratory distress.      Breath sounds: Normal breath sounds. No stridor. No wheezing, rhonchi or rales.   Abdominal:       General: Abdomen is flat. Bowel sounds are normal. There is no distension.      Palpations: Abdomen is soft. There is no mass.      Tenderness: There is no abdominal tenderness. There is no right CVA tenderness, left CVA tenderness, guarding or rebound.   Musculoskeletal:         General: No deformity or signs of injury.      Cervical back: Normal range of motion and neck supple.      Right lower leg: No edema.      Left lower leg: No edema.      Comments: Left wrist in splint   Lymphadenopathy:      Cervical: No cervical adenopathy.   Skin:     General: Skin is warm.      Capillary Refill: Capillary refill takes less than 2 seconds.      Coloration: Skin is not jaundiced or pale.      Findings: No lesion or rash.   Neurological:      General: No focal deficit present.      Mental Status: She is alert and oriented to person, place, and time. Mental status is at baseline.   Psychiatric:         Mood and Affect: Mood normal.         Behavior: Behavior normal.         Thought Content: Thought content normal.         Judgment: Judgment normal.             Screening recommendations appropriate to age and health status were reviewed.    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 As such, per RCRI the risk of cardiac death, nonfatal myocardial infarction, or nonfatal cardiac arrest is 0.4% and the risk of myocardial infarction, pulmonary edema, ventricular fibrillation, primary cardiac arrest, or complete heart block is 0.5%.  Overall this patient can be considered intermediate risk for this low risk procedure. No further cardiac testing is recommended at this time.     Patient denies any symptoms (as per HPI) concerning for undiagnosed lung disease including MILDRED. Would  recommend obtaining chest X-ray Patient quit smoking many years ago. We discussed the benefits of early mobilization and deep breathing after surgery.      Screened patient for alcohol misuse, use of illicit drugs, and personal or family history of anesthetic complications or bleeding diathesis and no substantial concerns were identified.    All current medications were reviewed and at this time no changes to medications are recommended prior to surgery.      1. Pre-op exam  - X-Ray Chest PA And Lateral; Future    2. Deficiency of clotting factor  - Protime-INR; Future  - APTT; Future    3. Atrial fibrillation, unspecified type  -NSR    4. Mixed hyperlipidemia      5. Essential hypertension  -controlled    6. History of hepatitis C  -successfully treated    7. 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 Hyperparathyroidism    9. Acquired hypothyroidism  -continue synthroid, followed by endo, will be getting u/s for thyromegaly    10. Fibromyalgia    11. Gastroesophageal reflux disease without esophagitis  -controlled      I recommend use of standard pre-op and post-op precautions for this patient. In my opinion, she is medically optimized for this procedure, and can proceed without further evaluation. PENDING RESULTS REVIEW

## 2022-03-11 NOTE — PATIENT INSTRUCTIONS
A few reminders from today:    Labs today  CXR today      Follow up with me if needed.   Please go to ER/urgent care if after hours or symptoms persist/worsen.     Do not hesitate to get in touch with me should you have any further questions.     Thank you for trusting me with your care!  I wish you health and happiness.    -Adri Levin PA-C

## 2022-03-12 ENCOUNTER — PATIENT MESSAGE (OUTPATIENT)
Dept: ENDOCRINOLOGY | Facility: CLINIC | Age: 77
End: 2022-03-12
Payer: MEDICARE

## 2022-04-04 ENCOUNTER — PATIENT MESSAGE (OUTPATIENT)
Dept: ENDOCRINOLOGY | Facility: CLINIC | Age: 77
End: 2022-04-04
Payer: MEDICARE

## 2022-04-04 DIAGNOSIS — E03.9 ACQUIRED HYPOTHYROIDISM: Primary | ICD-10-CM

## 2022-04-05 RX ORDER — LEVOTHYROXINE SODIUM 100 UG/1
100 TABLET ORAL
Qty: 30 TABLET | Refills: 11 | Status: SHIPPED | OUTPATIENT
Start: 2022-04-05 | End: 2022-07-21

## 2022-04-05 NOTE — TELEPHONE ENCOUNTER
Labs reviewed.    Should cut back thyroid hormone to 100 mcg daily. Get repeat labs in 8 weeks.    PTH has trended down to nl but calcium still slightly elevated. Can continue to monitor.

## 2022-04-11 ENCOUNTER — HOSPITAL ENCOUNTER (OUTPATIENT)
Dept: RADIOLOGY | Facility: HOSPITAL | Age: 77
Discharge: HOME | End: 2022-04-11
Attending: INTERNAL MEDICINE
Payer: MEDICARE

## 2022-04-11 DIAGNOSIS — E21.3 HYPERPARATHYROIDISM: ICD-10-CM

## 2022-04-11 PROCEDURE — 77081 DEXA BONE DENSITY APPENDICULAR SKELETON: ICD-10-PCS | Mod: 26,,, | Performed by: RADIOLOGY

## 2022-04-11 PROCEDURE — 77081 DXA BONE DENSITY APPENDICULR: CPT | Mod: 26,,, | Performed by: RADIOLOGY

## 2022-04-11 PROCEDURE — 77081 DXA BONE DENSITY APPENDICULR: CPT | Mod: TC,PO

## 2022-04-12 ENCOUNTER — TELEPHONE (OUTPATIENT)
Dept: ENDOCRINOLOGY | Facility: CLINIC | Age: 77
End: 2022-04-12
Payer: MEDICARE

## 2022-05-09 ENCOUNTER — PATIENT MESSAGE (OUTPATIENT)
Dept: SMOKING CESSATION | Facility: CLINIC | Age: 77
End: 2022-05-09
Payer: MEDICARE

## 2022-06-23 ENCOUNTER — TELEPHONE (OUTPATIENT)
Dept: FAMILY MEDICINE | Facility: CLINIC | Age: 77
End: 2022-06-23
Payer: MEDICARE

## 2022-06-23 ENCOUNTER — PATIENT MESSAGE (OUTPATIENT)
Dept: FAMILY MEDICINE | Facility: CLINIC | Age: 77
End: 2022-06-23
Payer: MEDICARE

## 2022-07-18 ENCOUNTER — TELEPHONE (OUTPATIENT)
Dept: ENDOCRINOLOGY | Facility: CLINIC | Age: 77
End: 2022-07-18
Payer: MEDICARE

## 2022-07-18 NOTE — TELEPHONE ENCOUNTER
Attempted to contact pt. No answer. LVM to call clinic back. Dr. Sandifer leaving [facility] 7/22/22. Had to cancel appt for 9/7/22 at 10am.

## 2022-07-21 ENCOUNTER — TELEPHONE (OUTPATIENT)
Dept: ENDOCRINOLOGY | Facility: CLINIC | Age: 77
End: 2022-07-21
Payer: MEDICARE

## 2022-07-21 DIAGNOSIS — E03.9 ACQUIRED HYPOTHYROIDISM: Primary | ICD-10-CM

## 2022-07-21 RX ORDER — LEVOTHYROXINE SODIUM 88 UG/1
88 TABLET ORAL
Qty: 30 TABLET | Refills: 11 | Status: SHIPPED | OUTPATIENT
Start: 2022-07-21 | End: 2022-10-27 | Stop reason: SDUPTHER

## 2022-07-21 NOTE — TELEPHONE ENCOUNTER
TSH remains suppressed. Cut thyroid hormone back to 88 mcg daily.  Labs in 8 weeks. Remind her to hold biotin 1 week prior to lab.

## 2022-09-08 ENCOUNTER — PATIENT MESSAGE (OUTPATIENT)
Dept: FAMILY MEDICINE | Facility: CLINIC | Age: 77
End: 2022-09-08
Payer: MEDICARE

## 2022-09-13 ENCOUNTER — PATIENT MESSAGE (OUTPATIENT)
Dept: FAMILY MEDICINE | Facility: CLINIC | Age: 77
End: 2022-09-13

## 2022-09-13 ENCOUNTER — OFFICE VISIT (OUTPATIENT)
Dept: FAMILY MEDICINE | Facility: CLINIC | Age: 77
End: 2022-09-13
Payer: MEDICARE

## 2022-09-13 VITALS
HEART RATE: 71 BPM | BODY MASS INDEX: 31.64 KG/M2 | OXYGEN SATURATION: 98 % | DIASTOLIC BLOOD PRESSURE: 76 MMHG | SYSTOLIC BLOOD PRESSURE: 122 MMHG | HEIGHT: 68 IN

## 2022-09-13 DIAGNOSIS — M79.7 FIBROMYALGIA: ICD-10-CM

## 2022-09-13 DIAGNOSIS — I48.0 PAROXYSMAL ATRIAL FIBRILLATION: ICD-10-CM

## 2022-09-13 DIAGNOSIS — Z01.818 PRE-OP EVALUATION: ICD-10-CM

## 2022-09-13 DIAGNOSIS — F41.9 ANXIETY: Primary | ICD-10-CM

## 2022-09-13 DIAGNOSIS — E21.3 HYPERPARATHYROIDISM: ICD-10-CM

## 2022-09-13 DIAGNOSIS — Z85.3 HISTORY OF BREAST CANCER: ICD-10-CM

## 2022-09-13 DIAGNOSIS — E78.2 MIXED HYPERLIPIDEMIA: ICD-10-CM

## 2022-09-13 DIAGNOSIS — E03.9 ACQUIRED HYPOTHYROIDISM: ICD-10-CM

## 2022-09-13 DIAGNOSIS — E83.52 HYPERCALCEMIA: ICD-10-CM

## 2022-09-13 DIAGNOSIS — I10 ESSENTIAL HYPERTENSION: ICD-10-CM

## 2022-09-13 PROCEDURE — 93005 ELECTROCARDIOGRAM TRACING: CPT | Mod: PBBFAC,PO | Performed by: INTERNAL MEDICINE

## 2022-09-13 PROCEDURE — 93010 ELECTROCARDIOGRAM REPORT: CPT | Mod: S$PBB,,, | Performed by: INTERNAL MEDICINE

## 2022-09-13 PROCEDURE — 99213 OFFICE O/P EST LOW 20 MIN: CPT | Mod: PBBFAC,PO | Performed by: INTERNAL MEDICINE

## 2022-09-13 PROCEDURE — 99214 OFFICE O/P EST MOD 30 MIN: CPT | Mod: S$PBB,,, | Performed by: INTERNAL MEDICINE

## 2022-09-13 PROCEDURE — 99999 PR PBB SHADOW E&M-EST. PATIENT-LVL III: CPT | Mod: PBBFAC,,, | Performed by: INTERNAL MEDICINE

## 2022-09-13 PROCEDURE — 93010 EKG 12-LEAD: ICD-10-PCS | Mod: S$PBB,,, | Performed by: INTERNAL MEDICINE

## 2022-09-13 PROCEDURE — 99214 PR OFFICE/OUTPT VISIT, EST, LEVL IV, 30-39 MIN: ICD-10-PCS | Mod: S$PBB,,, | Performed by: INTERNAL MEDICINE

## 2022-09-13 PROCEDURE — 99999 PR PBB SHADOW E&M-EST. PATIENT-LVL III: ICD-10-PCS | Mod: PBBFAC,,, | Performed by: INTERNAL MEDICINE

## 2022-09-13 RX ORDER — VENLAFAXINE HYDROCHLORIDE 75 MG/1
75 CAPSULE, EXTENDED RELEASE ORAL 3 TIMES DAILY
Qty: 270 CAPSULE | Refills: 3 | Status: SHIPPED | OUTPATIENT
Start: 2022-09-13 | End: 2023-03-21 | Stop reason: SDUPTHER

## 2022-09-13 RX ORDER — DIAZEPAM 5 MG/1
5 TABLET ORAL EVERY 12 HOURS PRN
Qty: 30 TABLET | Refills: 1 | Status: SHIPPED | OUTPATIENT
Start: 2022-09-13 | End: 2023-04-24 | Stop reason: SDUPTHER

## 2022-09-13 NOTE — PROGRESS NOTES
Patient ID: Laruen Junior     Chief Complaint:   Chief Complaint   Patient presents with    Pre-op Exam        HPI:  Routine follow-up and preop evaluation in advance of a left carpal tunnel release that will occur on September 22nd.  Overall she is doing well and I see no reason to not clear her for the surgery, but they do request an EKG and I will get lab work to make sure.  She also has a trigger finger on the ring finger of her right hand that was injected by hand surgeon (Brandt) but really has not helped.  She does admit to anxiety due to many stressors in her life- namely concerning the declining health of her brother who was on hospice as well as her  is still has post herpetic neuralgia.  I definitely sympathize with her and we discussed and we will give her a longer supply of Valium 5 mg that she took for a MRI or PET scan and a work very well to help calm her.  I will also increase her Effexor to 150 mg the morning and another 75 mg in the evening.  She has been on this medicine for many years to control her headaches and fibromyalgia but unfortunately her neurologist has recently retired so I will take over this prescription.  Her vital signs look good.    Review of Systems   Constitutional: Negative.    HENT: Negative.     Eyes: Negative.    Respiratory: Negative.     Cardiovascular: Negative.    Gastrointestinal: Negative.    Endocrine: Negative.    Genitourinary: Negative.    Musculoskeletal:  Positive for arthralgias.   Skin: Negative.    Allergic/Immunologic: Negative.    Neurological: Negative.    Hematological: Negative.    Psychiatric/Behavioral:  The patient is nervous/anxious.         Objective:      Physical Exam   Physical Exam  Vitals and nursing note reviewed.   Constitutional:       Appearance: Normal appearance. She is well-developed.   HENT:      Head: Normocephalic and atraumatic.      Nose: Nose normal.   Eyes:      Extraocular Movements: Extraocular movements intact.       "Conjunctiva/sclera: Conjunctivae normal.      Pupils: Pupils are equal, round, and reactive to light.   Cardiovascular:      Rate and Rhythm: Normal rate and regular rhythm.      Pulses: Normal pulses.      Heart sounds: Normal heart sounds.   Pulmonary:      Effort: Pulmonary effort is normal.      Breath sounds: Normal breath sounds.   Abdominal:      General: Bowel sounds are normal.      Palpations: Abdomen is soft.   Musculoskeletal:      Cervical back: Normal range of motion and neck supple.      Comments: Left wrist brace    Skin:     General: Skin is warm and dry.      Capillary Refill: Capillary refill takes less than 2 seconds.   Neurological:      General: No focal deficit present.      Mental Status: She is alert and oriented to person, place, and time.   Psychiatric:         Mood and Affect: Mood normal.         Behavior: Behavior normal.         Thought Content: Thought content normal.         Judgment: Judgment normal.          Vitals:   Vitals:    09/13/22 1400   BP: 122/76   BP Location: Right arm   Pulse: 71   SpO2: 98%   Weight: Comment: refused   Height: 5' 8" (1.727 m)          Current Outpatient Medications:     amitriptyline (ELAVIL) 100 MG tablet, Take 1 tablet (100 mg total) by mouth every evening., Disp: 90 tablet, Rfl: 3    amLODIPine (NORVASC) 5 MG tablet, Take 1 tablet (5 mg total) by mouth once daily., Disp: 90 tablet, Rfl: 3    levothyroxine (SYNTHROID) 88 MCG tablet, Take 1 tablet (88 mcg total) by mouth before breakfast., Disp: 30 tablet, Rfl: 11    metoprolol succinate (TOPROL-XL) 25 MG 24 hr tablet, Take 1 tablet (25 mg total) by mouth once daily., Disp: 90 tablet, Rfl: 3    multivitamin Tab, Take 1 tablet by mouth once daily. , Disp: , Rfl:     omeprazole (PRILOSEC) 40 MG capsule, Take 1 capsule (40 mg total) by mouth once daily., Disp: 90 capsule, Rfl: 3    polyethylene glycol (GLYCOLAX) 17 gram/dose powder, Take 17 g by mouth daily as needed. , Disp: , Rfl:     pregabalin " (LYRICA) 100 MG capsule, Take 1 capsule (100 mg total) by mouth 2 (two) times daily., Disp: 180 capsule, Rfl: 3    traMADoL (ULTRAM) 50 mg tablet, Take 1 tablet (50 mg total) by mouth every 6 (six) hours as needed for Pain., Disp: 90 tablet, Rfl: 0    vitamin D 1000 units Tab, Take 2,000 Units by mouth every evening. , Disp: , Rfl:     biotin 5,000 mcg TbDL, Take 1 tablet by mouth Daily., Disp: , Rfl:     celecoxib (CELEBREX) 200 MG capsule, Take 200 mg by mouth once daily., Disp: , Rfl:     diazePAM (VALIUM) 5 MG tablet, Take 1 tablet (5 mg total) by mouth every 12 (twelve) hours as needed for Anxiety., Disp: 30 tablet, Rfl: 1    venlafaxine (EFFEXOR-XR) 75 MG 24 hr capsule, Take 1 capsule (75 mg total) by mouth 3 (three) times daily. 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    Advanced care planning/counseling discussion 12/04/2020    Essential hypertension 12/04/2020    Binge eating disorder 11/06/2020    Mixed hyperlipidemia 09/30/2019    Fibromyalgia     Deficiency of clotting factor     History of hepatitis C     Migraines     Achilles tendon contracture due to neurologic cause, left 08/13/2018    Achilles tendonosis of left lower extremity 08/13/2018    Acute carpal tunnel syndrome 06/14/2017    Atrial fibrillation 09/09/2015    Hypothyroidism 09/09/2015    Acid reflux 09/09/2015          Plan:       Lauren Junior  was seen today for follow-up and may need lab work.    Diagnoses  and all orders for this visit:    Lauren was seen today for pre-op exam.    Diagnoses and all orders for this visit:    Anxiety  -     diazePAM (VALIUM) 5 MG tablet; Take 1 tablet (5 mg total) by mouth every 12 (twelve) hours as needed for Anxiety.  Monitor     Fibromyalgia  -     venlafaxine (EFFEXOR-XR) 75 MG 24 hr capsule; Take 1 capsule (75 mg total) by mouth 3 (three) times daily. Take 2 PO Q am and 1 PO Q pm  Monitor on new dosing     Pre-op evaluation  -     IN OFFICE EKG 12-LEAD (to Muse)    Essential hypertension  -     CBC Auto Differential; Future  -     Comprehensive Metabolic Panel; Future  Controlled with med     Hyperparathyroidism  -     Calcium, Ionized; Future  Check labs      Mixed hyperlipidemia  Check labs later      Paroxysmal atrial fibrillation  Controlled with med     Hypercalcemia  Check labs      Acquired hypothyroidism  Controlled with med     History of breast cancer   Resolved

## 2022-09-14 ENCOUNTER — TELEPHONE (OUTPATIENT)
Dept: FAMILY MEDICINE | Facility: CLINIC | Age: 77
End: 2022-09-14
Payer: MEDICARE

## 2022-10-27 ENCOUNTER — PATIENT MESSAGE (OUTPATIENT)
Dept: FAMILY MEDICINE | Facility: CLINIC | Age: 77
End: 2022-10-27
Payer: MEDICARE

## 2022-10-27 DIAGNOSIS — E03.9 ACQUIRED HYPOTHYROIDISM: ICD-10-CM

## 2022-10-27 RX ORDER — LEVOTHYROXINE SODIUM 88 UG/1
88 TABLET ORAL
Qty: 90 TABLET | Refills: 3 | Status: SHIPPED | OUTPATIENT
Start: 2022-10-27 | End: 2023-03-21 | Stop reason: SDUPTHER

## 2022-10-27 NOTE — TELEPHONE ENCOUNTER
No new care gaps identified.  [facility] Embedded Care Gaps. Reference number: [id number]. 10/27/2022   3:07:47 PM CDT

## 2023-01-09 ENCOUNTER — TELEPHONE (OUTPATIENT)
Dept: FAMILY MEDICINE | Facility: CLINIC | Age: 78
End: 2023-01-09
Payer: MEDICARE

## 2023-01-09 DIAGNOSIS — E83.52 HYPERCALCEMIA: ICD-10-CM

## 2023-01-09 DIAGNOSIS — E78.2 MIXED HYPERLIPIDEMIA: Primary | ICD-10-CM

## 2023-01-09 DIAGNOSIS — E03.9 ACQUIRED HYPOTHYROIDISM: ICD-10-CM

## 2023-01-09 DIAGNOSIS — I10 ESSENTIAL HYPERTENSION: ICD-10-CM

## 2023-01-09 NOTE — TELEPHONE ENCOUNTER
----- Message from De Barkley sent at 1/6/2023  9:14 AM CST -----  Caller is requesting to schedule their Lab appointment prior to annual appointment. Order is not listed in EPIC.  Please enter order and contact patient to schedule.         Name of Caller:MICHELLE DUNN [[id number]]         Preferred Date and Time of Labs: 5/8/23         Date of EPP Appointment: 5/15/23         Where would they like the lab performed? [facility]          Would the patient rather a call back or a response via [facility]? no         Best Call Back Number: [phone]           Additional Information:

## 2023-01-10 NOTE — TELEPHONE ENCOUNTER
Spoke with patient, advised her that the orders are in. She stated that she usually goes to the OOP at [facility] to have them done.

## 2023-04-17 ENCOUNTER — PATIENT MESSAGE (OUTPATIENT)
Dept: FAMILY MEDICINE | Facility: CLINIC | Age: 78
End: 2023-04-17
Payer: MEDICARE

## 2023-04-24 ENCOUNTER — OFFICE VISIT (OUTPATIENT)
Dept: FAMILY MEDICINE | Facility: CLINIC | Age: 78
End: 2023-04-24
Payer: MEDICARE

## 2023-04-24 VITALS
SYSTOLIC BLOOD PRESSURE: 136 MMHG | HEART RATE: 74 BPM | BODY MASS INDEX: 31.64 KG/M2 | HEIGHT: 68 IN | DIASTOLIC BLOOD PRESSURE: 78 MMHG | OXYGEN SATURATION: 95 %

## 2023-04-24 DIAGNOSIS — E66.09 CLASS 1 OBESITY DUE TO EXCESS CALORIES WITH SERIOUS COMORBIDITY AND BODY MASS INDEX (BMI) OF 31.0 TO 31.9 IN ADULT: ICD-10-CM

## 2023-04-24 DIAGNOSIS — I70.0 AORTIC ATHEROSCLEROSIS: ICD-10-CM

## 2023-04-24 DIAGNOSIS — E78.2 MIXED HYPERLIPIDEMIA: ICD-10-CM

## 2023-04-24 DIAGNOSIS — I48.0 PAROXYSMAL ATRIAL FIBRILLATION: ICD-10-CM

## 2023-04-24 DIAGNOSIS — F41.9 ANXIETY: ICD-10-CM

## 2023-04-24 DIAGNOSIS — E21.3 HYPERPARATHYROIDISM: ICD-10-CM

## 2023-04-24 DIAGNOSIS — S83.207D TEAR OF MENISCUS OF LEFT KNEE AS CURRENT INJURY, UNSPECIFIED MENISCUS, UNSPECIFIED TEAR TYPE, SUBSEQUENT ENCOUNTER: ICD-10-CM

## 2023-04-24 DIAGNOSIS — E83.52 HYPERCALCEMIA: ICD-10-CM

## 2023-04-24 DIAGNOSIS — M79.7 FIBROMYALGIA: ICD-10-CM

## 2023-04-24 DIAGNOSIS — I10 ESSENTIAL HYPERTENSION: ICD-10-CM

## 2023-04-24 DIAGNOSIS — D68.4: ICD-10-CM

## 2023-04-24 DIAGNOSIS — Z85.3 HISTORY OF BREAST CANCER: ICD-10-CM

## 2023-04-24 DIAGNOSIS — Z01.818 PRE-OP EVALUATION: Primary | ICD-10-CM

## 2023-04-24 DIAGNOSIS — E03.9 ACQUIRED HYPOTHYROIDISM: ICD-10-CM

## 2023-04-24 PROBLEM — E04.9 GOITER: Status: RESOLVED | Noted: 2022-03-07 | Resolved: 2023-04-24

## 2023-04-24 PROBLEM — C80.1 PAPILLARY CARCINOMA: Status: RESOLVED | Noted: 2021-12-15 | Resolved: 2023-04-24

## 2023-04-24 PROCEDURE — 99214 PR OFFICE/OUTPT VISIT, EST, LEVL IV, 30-39 MIN: ICD-10-PCS | Mod: S$PBB,,, | Performed by: INTERNAL MEDICINE

## 2023-04-24 PROCEDURE — 99999 PR PBB SHADOW E&M-EST. PATIENT-LVL III: CPT | Mod: PBBFAC,,, | Performed by: INTERNAL MEDICINE

## 2023-04-24 PROCEDURE — 99999 PR PBB SHADOW E&M-EST. PATIENT-LVL III: ICD-10-PCS | Mod: PBBFAC,,, | Performed by: INTERNAL MEDICINE

## 2023-04-24 PROCEDURE — 99213 OFFICE O/P EST LOW 20 MIN: CPT | Mod: PBBFAC,PO | Performed by: INTERNAL MEDICINE

## 2023-04-24 PROCEDURE — 99214 OFFICE O/P EST MOD 30 MIN: CPT | Mod: S$PBB,,, | Performed by: INTERNAL MEDICINE

## 2023-04-24 RX ORDER — TRAMADOL HYDROCHLORIDE 50 MG/1
50 TABLET ORAL EVERY 6 HOURS PRN
Qty: 90 TABLET | Refills: 0 | Status: SHIPPED | OUTPATIENT
Start: 2023-04-24 | End: 2023-06-05 | Stop reason: SDUPTHER

## 2023-04-24 RX ORDER — DIAZEPAM 5 MG/1
5 TABLET ORAL EVERY 12 HOURS PRN
Qty: 30 TABLET | Refills: 1 | Status: SHIPPED | OUTPATIENT
Start: 2023-04-24 | End: 2023-06-05 | Stop reason: SDUPTHER

## 2023-04-24 NOTE — PROGRESS NOTES
Patient ID: Lauren Junior     Chief Complaint:   Chief Complaint   Patient presents with    Follow-up     8 month         HPI:  Patient needs preop clearance in advance of a left knee arthroscopy for meniscus tear.  She is understandably upset but I do not have any reason why she is not cleared for the surgery so I do wish her well.  She did have an episode of quite high blood pressure this past Saturday which was 2 days ago where her systolic went up to 182.  It has since normalized and in retrospect we do not know what caused a spike in her blood pressure other than a lot of mental stress.  Since our last office visit a few months ago, her brother passed away and I am very sorry about that.  She is quite upset that she has yet another meniscus tear and will have to delay a vacation and I do sympathize with her.  She has been taking Valium more consistently as of late and requests a refill which I provided.  She did increase her Effexor after our last office visit per my recommendations but she did not feel quite right 2 weeks into it so she went back down to her usual dose.  Labs show normal thyroid function tests but her LDL cholesterol is much higher than her baseline.  She previously had joint pain with a statin but can not remember what statin was so going to table this for now and consider Zetia postop.  Her calcium and parathyroid hormone are both slightly high have been that way for quite some time.  I do think she has hyperparathyroidism and would like to refer her to the hyperparathyroid clinic.  She does complain of fatigue and generalized aches and pains as well as some memory loss which can all the hallmarks of the hyperparathyroidism due to the high calcium level.  She does have a background of fibromyalgia which could be glued Ng our physical signs and symptoms but I think a lower calcium would help her out.    Review of Systems       Myalgias  Knee pain     Objective:      Physical Exam   Physical  "Exam       Decreased Range of Motion in Left knee due to meniscus tear     Vitals:   Vitals:    04/24/23 1441   BP: 136/78   Pulse: 74   SpO2: 95%   Weight: Comment: refused to weigh   Height: 5' 8" (1.727 m)          Current Outpatient Medications:     amitriptyline (ELAVIL) 100 MG tablet, Take 1 tablet (100 mg total) by mouth every evening., Disp: 90 tablet, Rfl: 3    amLODIPine (NORVASC) 5 MG tablet, Take 1 tablet (5 mg total) by mouth once daily., Disp: 90 tablet, Rfl: 3    levothyroxine (SYNTHROID) 88 MCG tablet, Take 1 tablet (88 mcg total) by mouth before breakfast., Disp: 90 tablet, Rfl: 1    metoprolol succinate (TOPROL-XL) 25 MG 24 hr tablet, Take 1 tablet (25 mg total) by mouth once daily., Disp: 90 tablet, Rfl: 3    multivitamin Tab, Take 1 tablet by mouth once daily. , Disp: , Rfl:     omeprazole (PRILOSEC) 40 MG capsule, Take 1 capsule (40 mg total) by mouth once daily., Disp: 90 capsule, Rfl: 3    pregabalin (LYRICA) 100 MG capsule, Take 1 capsule (100 mg total) by mouth 2 (two) times daily., Disp: 180 capsule, Rfl: 3    venlafaxine (EFFEXOR-XR) 75 MG 24 hr capsule, Take 1 capsule (75 mg total) by mouth 3 (three) times daily. Take 2 PO Q am and 1 PO Q pm, Disp: 270 capsule, Rfl: 1    vitamin D 1000 units Tab, Take 2,000 Units by mouth every evening. , Disp: , Rfl:     diazePAM (VALIUM) 5 MG tablet, Take 1 tablet (5 mg total) by mouth every 12 (twelve) hours as needed for Anxiety., Disp: 30 tablet, Rfl: 1    polyethylene glycol (GLYCOLAX) 17 gram/dose powder, Take 17 g by mouth daily as needed. 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    Advanced care planning/counseling discussion 12/04/2020    Essential hypertension 12/04/2020    Binge eating disorder 11/06/2020    Mixed hyperlipidemia 09/30/2019    Fibromyalgia     Deficiency of clotting factor     History of hepatitis C     Migraines     Achilles tendon contracture due to neurologic cause, left 08/13/2018    Achilles tendonosis of left lower extremity 08/13/2018    Acute carpal tunnel syndrome 06/14/2017    Atrial fibrillation 09/09/2015    Hypothyroidism 09/09/2015    Acid reflux 09/09/2015          Plan:       Lauren Junior  was seen today for follow-up and may need lab work.    Diagnoses and all orders for this visit:    Lauren was seen today for follow-up.    Diagnoses and all orders for this visit:    Pre-op evaluation    Anxiety  -     diazePAM (VALIUM) 5 MG tablet; Take 1 tablet (5 mg total) by mouth every 12 (twelve) hours as needed for Anxiety.  Controlled with med     Fibromyalgia  -     traMADoL (ULTRAM) 50 mg tablet; Take 1 tablet (50 mg total) by mouth every 6 (six) hours as needed for Pain.  Continue meds    Aortic atherosclerosis  Monitor    History of breast cancer  Resolved     Hyperparathyroidism  Will refer to Hyperparathyroid clinic    Deficiency of clotting factor  Factor 11 Def - only issue is bruising and does ok with surgery     Paroxysmal atrial fibrillation  Controlled with med     Essential hypertension  Controlled with med   Monitor for Blood Pressure spikes     Acquired hypothyroidism  Controlled with med     Hypercalcemia  Can be the reason for pains and memory loss     Mixed hyperlipidemia  LDL High- consider Zetia     Tear of meniscus of left knee as current injury, unspecified meniscus, unspecified tear type, subsequent  encounter  Cleared to get surgery soon     Obesity- will Monitor as she gains more exercise

## 2023-06-05 ENCOUNTER — PATIENT MESSAGE (OUTPATIENT)
Dept: FAMILY MEDICINE | Facility: CLINIC | Age: 78
End: 2023-06-05
Payer: MEDICARE

## 2023-06-05 DIAGNOSIS — M79.7 FIBROMYALGIA: ICD-10-CM

## 2023-06-05 DIAGNOSIS — F41.9 ANXIETY: ICD-10-CM

## 2023-06-05 DIAGNOSIS — I10 ESSENTIAL HYPERTENSION: ICD-10-CM

## 2023-06-05 DIAGNOSIS — E03.9 ACQUIRED HYPOTHYROIDISM: ICD-10-CM

## 2023-06-05 DIAGNOSIS — K21.9 GASTROESOPHAGEAL REFLUX DISEASE WITHOUT ESOPHAGITIS: ICD-10-CM

## 2023-06-05 RX ORDER — METOPROLOL SUCCINATE 25 MG/1
25 TABLET, EXTENDED RELEASE ORAL DAILY
Qty: 90 TABLET | Refills: 3 | Status: SHIPPED | OUTPATIENT
Start: 2023-06-05

## 2023-06-05 RX ORDER — AMITRIPTYLINE HYDROCHLORIDE 100 MG/1
100 TABLET ORAL NIGHTLY
Qty: 90 TABLET | Refills: 3 | Status: SHIPPED | OUTPATIENT
Start: 2023-06-05

## 2023-06-05 RX ORDER — DIAZEPAM 5 MG/1
5 TABLET ORAL EVERY 12 HOURS PRN
Qty: 30 TABLET | Refills: 0 | Status: SHIPPED | OUTPATIENT
Start: 2023-06-05 | End: 2023-06-21 | Stop reason: SDUPTHER

## 2023-06-05 RX ORDER — TRAMADOL HYDROCHLORIDE 50 MG/1
50 TABLET ORAL EVERY 6 HOURS PRN
Qty: 90 TABLET | Refills: 0 | Status: SHIPPED | OUTPATIENT
Start: 2023-06-05 | End: 2023-06-21 | Stop reason: SDUPTHER

## 2023-06-05 RX ORDER — PREGABALIN 100 MG/1
100 CAPSULE ORAL 2 TIMES DAILY
Qty: 180 CAPSULE | Refills: 0 | Status: SHIPPED | OUTPATIENT
Start: 2023-06-05 | End: 2023-06-21 | Stop reason: SDUPTHER

## 2023-06-05 RX ORDER — AMLODIPINE BESYLATE 5 MG/1
5 TABLET ORAL DAILY
Qty: 90 TABLET | Refills: 3 | Status: SHIPPED | OUTPATIENT
Start: 2023-06-05

## 2023-06-05 RX ORDER — LEVOTHYROXINE SODIUM 88 UG/1
88 TABLET ORAL
Qty: 90 TABLET | Refills: 3 | Status: SHIPPED | OUTPATIENT
Start: 2023-06-05

## 2023-06-05 RX ORDER — OMEPRAZOLE 40 MG/1
40 CAPSULE, DELAYED RELEASE ORAL DAILY
Qty: 90 CAPSULE | Refills: 3 | Status: SHIPPED | OUTPATIENT
Start: 2023-06-05

## 2023-06-05 NOTE — TELEPHONE ENCOUNTER
No care due was identified.  [facility] Embedded Care Due Messages. Reference number: [id number].   6/05/2023 4:48:54 PM CDT

## 2023-06-05 NOTE — TELEPHONE ENCOUNTER
Patient is now using a mail order pharmacy.    Lov: 04/24/23  Nov: 07/25/23    Can you refill the medication in Dr. Viramontes's absence.

## 2023-06-05 NOTE — TELEPHONE ENCOUNTER
No care due was identified.  [facility] Embedded Care Due Messages. Reference number: [id number].   6/05/2023 8:49:20 AM CDT

## 2023-06-05 NOTE — TELEPHONE ENCOUNTER
Spoke with the patient over the telephone and told her to call back on Monday to request a change of pharmacy. Her provider is out all week. She picked up her amlodipine and metoprolol already and will need a new script until next the next refill

## 2023-06-06 NOTE — TELEPHONE ENCOUNTER
Refill Decision Note   Lauren Junior  is requesting a refill authorization.  Brief Assessment and Rationale for Refill:  Approve     Medication Therapy Plan:  Rx sent to patient's requested pharmacy.      Alert overridden per protocol: Yes   Comments:     No Care Gaps recommended.     Note composed:7:07 PM 06/05/2023

## 2023-06-12 ENCOUNTER — PATIENT MESSAGE (OUTPATIENT)
Dept: FAMILY MEDICINE | Facility: CLINIC | Age: 78
End: 2023-06-12
Payer: MEDICARE

## 2023-06-12 DIAGNOSIS — F41.9 ANXIETY: ICD-10-CM

## 2023-06-12 DIAGNOSIS — M79.7 FIBROMYALGIA: ICD-10-CM

## 2023-06-21 RX ORDER — VENLAFAXINE HYDROCHLORIDE 75 MG/1
75 CAPSULE, EXTENDED RELEASE ORAL 3 TIMES DAILY
Qty: 270 CAPSULE | Refills: 1 | Status: SHIPPED | OUTPATIENT
Start: 2023-06-21 | End: 2023-12-15 | Stop reason: DRUGHIGH

## 2023-06-21 RX ORDER — DIAZEPAM 5 MG/1
5 TABLET ORAL EVERY 12 HOURS PRN
Qty: 30 TABLET | Refills: 0 | Status: SHIPPED | OUTPATIENT
Start: 2023-06-21 | End: 2023-11-10

## 2023-06-21 RX ORDER — TRAMADOL HYDROCHLORIDE 50 MG/1
50 TABLET ORAL EVERY 6 HOURS PRN
Qty: 90 TABLET | Refills: 0 | Status: SHIPPED | OUTPATIENT
Start: 2023-06-21

## 2023-06-21 RX ORDER — PREGABALIN 100 MG/1
100 CAPSULE ORAL 2 TIMES DAILY
Qty: 180 CAPSULE | Refills: 0 | Status: SHIPPED | OUTPATIENT
Start: 2023-06-21 | End: 2023-09-24

## 2023-06-21 NOTE — TELEPHONE ENCOUNTER
No care due was identified.  [facility] Embedded Care Due Messages. Reference number: [id number].   6/21/2023 11:51:37 AM CDT

## 2023-06-29 ENCOUNTER — OFFICE VISIT (OUTPATIENT)
Dept: ENDOCRINOLOGY | Facility: CLINIC | Age: 78
End: 2023-06-29
Payer: MEDICARE

## 2023-06-29 VITALS
HEART RATE: 71 BPM | TEMPERATURE: 98 F | DIASTOLIC BLOOD PRESSURE: 84 MMHG | SYSTOLIC BLOOD PRESSURE: 138 MMHG | WEIGHT: 236.81 LBS | BODY MASS INDEX: 36.01 KG/M2

## 2023-06-29 DIAGNOSIS — E83.52 HYPERCALCEMIA: Primary | ICD-10-CM

## 2023-06-29 DIAGNOSIS — I10 ESSENTIAL HYPERTENSION: ICD-10-CM

## 2023-06-29 DIAGNOSIS — E03.9 ACQUIRED HYPOTHYROIDISM: ICD-10-CM

## 2023-06-29 DIAGNOSIS — E21.3 HYPERPARATHYROIDISM: ICD-10-CM

## 2023-06-29 PROCEDURE — 99214 OFFICE O/P EST MOD 30 MIN: CPT | Mod: S$PBB,,, | Performed by: HOSPITALIST

## 2023-06-29 PROCEDURE — 99214 PR OFFICE/OUTPT VISIT, EST, LEVL IV, 30-39 MIN: ICD-10-PCS | Mod: S$PBB,,, | Performed by: HOSPITALIST

## 2023-06-29 PROCEDURE — 99999 PR PBB SHADOW E&M-EST. PATIENT-LVL IV: CPT | Mod: PBBFAC,,, | Performed by: HOSPITALIST

## 2023-06-29 PROCEDURE — 99214 OFFICE O/P EST MOD 30 MIN: CPT | Mod: PBBFAC | Performed by: HOSPITALIST

## 2023-06-29 PROCEDURE — 99999 PR PBB SHADOW E&M-EST. PATIENT-LVL IV: ICD-10-PCS | Mod: PBBFAC,,, | Performed by: HOSPITALIST

## 2023-06-29 NOTE — ASSESSMENT & PLAN NOTE
- see workup above  - would avoid the use of hydrochlorothiazide/Chlorthalidone in setting of hypertension

## 2023-06-29 NOTE — PROGRESS NOTES
Subjective:      Patient ID: Lauren Junior is a 77 y.o. female presented to [facility] on 6/29/2023.  Chief Complaint:  Hyperparathyroidism    History of Present Illness: Lauren Junior is a 77 y.o. female here for  Hyperparathyroidism  Other significant past medical history:  Obesity, Fibromyalaiga    Interval history:  Patient is new to me.  Previously seen by endocrinologist Dr. Sandifer at [facility] 03/2022  Chart review show patient with hyperparathyroidism with recommendation for evaluation parathyroidectomy in 2022, she declined surgical evaluation.  Recently patient reported symptoms of worsening h depresssed, no motivation, lethargy that is new.  PCP increase Lexapro without any improvement, actually worsening her fatigue level  Patient denies any calcium supplements  Patient's  also had parathyroidectomy, uncomplicated.  Which improved his symptoms      1) Hyperparathyroidism with hypercalcemia  - Patient with noted hypercalcemia on: 2018  - With elevated PTH level at:  - Chronic kidney disease/ESRD:no  - Daily intake of calcium is: None  - Taking vitamin D: 3000iu  - 24 urine calcium Ca/cr ratio- 0.025 (Volume 3.6L, U ca 8.1, U cr 28, Sca 10.1, Scr 0.87)  - DXA 2022: The right forearm bone mineral density is equal to 0.648 g/cm squared with a T score of -0.6.  Impression:  Normal bone mineral density.    - Hx of renal stone 3x, last episode >20 years ago, Kidney US for asymptomatic stones was nl 9/2021  - Pt with 2022 L wrist fracture of radius. 5/2023: had a fall with R hand fracture after a fall     Symptoms  No   Yes  []    [x]  Depression  []    [x]  Fatigue  []    [x]  Mental Fog  []    [x]  Polyuria/Polydipsia  []    []  Nausea, vomiting  []    [x]  Constipation  [x]    []  Abdominal pain  []    [x]  Muscle weakness  []    [x]  Bone pain    Past medical history significant for:  - Vitamin-D deficiency: no  - Osteoporosis: no  - Renal stones: no  - Gastric  bypass/weight loss surgery/Crohn's/ulcerative colitis:  no  - History of cancer/with metastasis: breast cancer, lumpectomy 2022 and then total masectomy in 2022. No chemo or radation  - Prior radiation treatment, or unexplained elevations of alk phos on labs: no    Lab Results   Component Value Date    .2 (H) 04/20/2023    PTH 51.3 04/04/2022    PTH 94.6 (H) 07/29/2021    CALCIUM 10.6 (H) 04/20/2023    CALCIUM 10.3 (H) 09/13/2022    CALCIUM 10.6 (H) 04/04/2022    CALCIUM 9.3 03/02/2022    CALCIUM 10.1 09/15/2021    CAION 1.30 04/20/2023    CAION 1.38 (H) 09/13/2022     Lab Results   Component Value Date    TABQCUNM50HA 51 09/15/2021    LNNPSATT56XG 46 08/03/2018    ALKPHOS 79 04/20/2023    ALKPHOS 80 09/13/2022    PHOS 3.1 09/15/2021    TSH 2.180 04/20/2023     24Hour Urine  Lab Results   Component Value Date    BJKDPDL67PCT 292 09/15/2021    CRTURNMGSPEC 1004.4 09/15/2021   Urine calcium-to-creatinine ratio: 0.025 9/15/2023          2) Hypothyroidism  -  since 2015  - chronic longstanding issue, since 2015  - compliant with levothyroxine 88 mcg    Reviewed past surgical, medical, family, social history and updated as appropriate.  Review of Systems: see HPI above  Objective:   /84   Pulse 71   Temp 98.3 °F (36.8 °C) (Oral)   Wt 107.4 kg (236 lb 12.8 oz)   BMI 36.01 kg/m²   Body mass index is 36.01 kg/m².  Vital signs reviewed    Physical Exam  Vitals and nursing note reviewed.   Constitutional:       General: She is not in acute distress.     Appearance: Normal appearance. She is well-developed. She is obese. She is not toxic-appearing.   Neck:      Thyroid: No thyromegaly.   Cardiovascular:      Heart sounds: Normal heart sounds.   Pulmonary:      Effort: Pulmonary effort is normal. No respiratory distress.   Abdominal:      Tenderness: There is no abdominal tenderness.   Musculoskeletal:         General: No deformity. Normal range of motion.      Cervical back: Normal range of motion.   Skin:      Findings: No bruising.   Neurological:      Mental Status: She is alert and oriented to person, place, and time.   Psychiatric:         Mood and Affect: Mood normal.       Lab Reviewed:  See results in subjective  No results found for: HGBA1C  Lab Results   Component Value Date    CHOL 259 (H) 04/20/2023    HDL 72 04/20/2023    LDLCALC 172.4 (H) 04/20/2023    TRIG 73 04/20/2023    CHOLHDL 27.8 04/20/2023     Lab Results   Component Value Date     04/20/2023    K 4.9 04/20/2023     04/20/2023    CO2 32 (H) 04/20/2023    GLU 96 04/20/2023    BUN 27 (H) 04/20/2023    CREATININE 1.08 04/20/2023    CALCIUM 10.6 (H) 04/20/2023    PHOS 3.1 09/15/2021    PROT 7.5 04/20/2023    ALBUMIN 4.8 04/20/2023    BILITOT 0.6 04/20/2023    ALKPHOS 79 04/20/2023    AST 27 04/20/2023    ALT 18 04/20/2023    ANIONGAP 5 (L) 04/20/2023    ESTGFRAFRICA >60 03/02/2022    EGFRNONAA >60 03/02/2022    TSH 2.180 04/20/2023    .2 (H) 04/20/2023    CNUGFJKJ23EZ 51 09/15/2021     Assessment     1. Hypercalcemia  CT Neck Parathyroid (4D)    CT Neck Parathyroid (4D)    CANCELED: CT Neck Parathyroid (4D)      2. Hyperparathyroidism  CT Neck Parathyroid (4D)    CT Neck Parathyroid (4D)    CANCELED: CT Neck Parathyroid (4D)      3. Acquired hypothyroidism        4. Essential hypertension           Plan     Hyperparathyroidism  - Longstanding history of hypercalcemia since 2018  - Mechanism of hyperparathyroidism leading to hypercalcemia was explained to pt, all questions were answered  - No history of CKD stage IIIA  - patient with remote history of renal stone, fracture of left wrist and right hand recently after falls  - Currently not taking any calcium supplements   - Patient reports symptoms that could be related to hyperparathyroidism/hypercalcemia: Lethargy, depression, fatigue  - 24 urine calcium Ca/cr ratio- 0.025 (Volume 3.6L, U ca 8.1, U cr 28, Sca 10.1, Scr 0.87), rules out [facility]  - DXA 2022: The right forearm bone mineral  density is equal to 0.648 g/cm squared with a T score of -0.6. Normal bone mineral density.    Plan  - DOES fit parathyroidectomy surgical criteria at this time, given symptoms above  - discussed with patient, next step is CT-4D scan of parathyroid scan  - repeat lab work per PCP, patient does have lab work soon monitor calcium, PTH  - patient interested in definitive surgical option with parathyroidectomy, patient will do research on the surgeon she would like to get parathyroid surgery.  I recommend ENT doctors at [facility]  - Advised to avoid dehydration, excessive calcium supplementations and avoid medication like HCTZ/Chlorthalidone/Lithium that can worsen hypercalcemia.       Hypercalcemia  - see workup above  - would avoid the use of hydrochlorothiazide/Chlorthalidone in setting of hypertension    Hypothyroidism  - hyperthyroidism chronic, continue management by PCP    Essential hypertension  - would avoid the use of hydrochlorothiazide/Chlorthalidone in setting of hypertension      Advised patient to follow up with PCP for routine health maintenance care.   RTC in pending workup    Dennis Hatfield M.D.  Endocrinology  [facility]  6/29/2023      Disclaimer: This note has been generated in part with the use of voice-recognition software. There may be typographical errors that have been missed during proof-reading.

## 2023-06-29 NOTE — ASSESSMENT & PLAN NOTE
- Longstanding history of hypercalcemia since 2018  - Mechanism of hyperparathyroidism leading to hypercalcemia was explained to pt, all questions were answered  - No history of CKD stage IIIA  - patient with remote history of renal stone, fracture of left wrist and right hand recently after falls  - Currently not taking any calcium supplements   - Patient reports symptoms that could be related to hyperparathyroidism/hypercalcemia: Lethargy, depression, fatigue  - 24 urine calcium Ca/cr ratio- 0.025 (Volume 3.6L, U ca 8.1, U cr 28, Sca 10.1, Scr 0.87), rules out [facility]  - DXA 2022: The right forearm bone mineral density is equal to 0.648 g/cm squared with a T score of -0.6. Normal bone mineral density.    Plan  - DOES fit parathyroidectomy surgical criteria at this time, given symptoms above  - discussed with patient, next step is CT-4D scan of parathyroid scan  - repeat lab work per PCP, patient does have lab work soon monitor calcium, PTH  - patient interested in definitive surgical option with parathyroidectomy, patient will do research on the surgeon she would like to get parathyroid surgery.  I recommend ENT doctors at [facility]  - Advised to avoid dehydration, excessive calcium supplementations and avoid medication like HCTZ/Chlorthalidone/Lithium that can worsen hypercalcemia.

## 2023-07-18 ENCOUNTER — PATIENT MESSAGE (OUTPATIENT)
Dept: FAMILY MEDICINE | Facility: CLINIC | Age: 78
End: 2023-07-18
Payer: MEDICARE

## 2023-07-18 ENCOUNTER — PATIENT MESSAGE (OUTPATIENT)
Dept: ENDOCRINOLOGY | Facility: CLINIC | Age: 78
End: 2023-07-18
Payer: MEDICARE

## 2023-07-18 DIAGNOSIS — E21.3 HYPERPARATHYROIDISM: Primary | ICD-10-CM

## 2023-07-19 ENCOUNTER — TELEPHONE (OUTPATIENT)
Dept: HEMATOLOGY/ONCOLOGY | Facility: CLINIC | Age: 78
End: 2023-07-19
Payer: MEDICARE

## 2023-07-19 NOTE — NURSING
Oncology Navigation   Intake  Date Worked: 07/19/23     Treatment  Current Status: Staging work-up    Surgical Oncologist: Dr. Pedro Pablo Morton ([facility])  Consult Date: 07/21/23                          Acuity      Follow Up  No follow-ups on file.

## 2023-07-19 NOTE — TELEPHONE ENCOUNTER
Scheduled appt with Dr. Morton for hyperparathyroidism. Appt info reviewed and patient verbalized understanding          ----- Message from Yanet Barnett sent at 7/19/2023 11:50 AM CDT -----  Type: Needs Medical Advice  Who Called:  Patient   Symptoms (please be specific):    How long has patient had these symptoms:    Pharmacy name and phone #:    Best Call Back Number: [phone]  Additional Information: Patient is requesting a call back to schedule a NP appt. with Dr. Morton.

## 2023-07-21 ENCOUNTER — OFFICE VISIT (OUTPATIENT)
Dept: HEMATOLOGY/ONCOLOGY | Facility: CLINIC | Age: 78
End: 2023-07-21
Payer: MEDICARE

## 2023-07-21 VITALS
DIASTOLIC BLOOD PRESSURE: 76 MMHG | HEIGHT: 68 IN | HEART RATE: 77 BPM | SYSTOLIC BLOOD PRESSURE: 124 MMHG | OXYGEN SATURATION: 97 % | BODY MASS INDEX: 35.42 KG/M2 | WEIGHT: 233.69 LBS | RESPIRATION RATE: 18 BRPM | TEMPERATURE: 97 F

## 2023-07-21 DIAGNOSIS — E21.3 HYPERPARATHYROIDISM: Primary | ICD-10-CM

## 2023-07-21 PROCEDURE — 99999 PR PBB SHADOW E&M-EST. PATIENT-LVL V: ICD-10-PCS | Mod: PBBFAC,,, | Performed by: OTOLARYNGOLOGY

## 2023-07-21 PROCEDURE — 99204 PR OFFICE/OUTPT VISIT, NEW, LEVL IV, 45-59 MIN: ICD-10-PCS | Mod: S$PBB,,, | Performed by: OTOLARYNGOLOGY

## 2023-07-21 PROCEDURE — 99204 OFFICE O/P NEW MOD 45 MIN: CPT | Mod: S$PBB,,, | Performed by: OTOLARYNGOLOGY

## 2023-07-21 PROCEDURE — 99999 PR PBB SHADOW E&M-EST. PATIENT-LVL V: CPT | Mod: PBBFAC,,, | Performed by: OTOLARYNGOLOGY

## 2023-07-21 PROCEDURE — 99215 OFFICE O/P EST HI 40 MIN: CPT | Mod: PBBFAC,PN | Performed by: OTOLARYNGOLOGY

## 2023-07-21 RX ORDER — LIDOCAINE HYDROCHLORIDE 10 MG/ML
1 INJECTION, SOLUTION EPIDURAL; INFILTRATION; INTRACAUDAL; PERINEURAL ONCE
Status: CANCELLED | OUTPATIENT
Start: 2023-07-21 | End: 2023-07-21

## 2023-07-21 RX ORDER — DEXAMETHASONE SODIUM PHOSPHATE 4 MG/ML
8 INJECTION, SOLUTION INTRA-ARTICULAR; INTRALESIONAL; INTRAMUSCULAR; INTRAVENOUS; SOFT TISSUE
Status: CANCELLED | OUTPATIENT
Start: 2023-07-21

## 2023-07-21 NOTE — PROGRESS NOTES
[facility]    Subjective:       Patient ID: Lauren Junior is a 77 y.o. female.    Chief Complaint: Hyperparathyroidism    HPI    Lauren Junior is a 77 y.o. female who presents as a referral from Dr. Viramontes and Dr. Hatfield for consideration of parathyroidectomy in the setting of primary hyperparathyroidism. She was found to be hyperpara following a wrist fracture in the spring of 2022 on lab studies. She broke her right hand in May of 2023. She has kidney issues and has had kidney stones in the past. She reports GERD, as well as a lack of motivation that borders on depression (she does not want to work in her yard, which is her favorite thing to do). She is not on lithium or HCTZ. She has not had prior neck surgery.     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     Amor lab info from Dr. Hatfield:  - With elevated PTH level at:  - Chronic kidney disease/ESRD:no  - Daily intake of calcium is: None  - Taking vitamin D: 3000iu  - 24 urine calcium Ca/cr ratio- 0.025 (Volume 3.6L, U ca 8.1, U cr 28, Sca 10.1, Scr 0.87)  - DXA 2022: The right forearm bone mineral density is equal to 0.648 g/cm squared with a T score of -0.6.  Impression:  Normal bone mineral density.       Past Medical History:   Diagnosis Date    Acid reflux 9/9/2015    Arthritis     Atrial fibrillation 9/9/2015    Breast cancer 2021    Papillary Ductal    Deficiency of clotting factor     Factor 11    Elevated blood pressure 9/9/2015    Encounter for blood transfusion 1967    Fibromyalgia     History of hepatitis C     s/p Interferon & Ribavarin     Hypothyroidism 9/9/2015    Kidney stones     Migraines     Thoracic outlet syndrome     s/p B rib resections       Past Surgical History:   Procedure Laterality Date    ADENOIDECTOMY      big toe pinned  2012    wilmer rib resection      CYSTOSCOPY W/ STONE MANIPULATION      DEBRIDEMENT OF  TENDON Left 8/13/2018    Procedure: DEBRIDEMENT, TENDON ACHILLES;  Surgeon: Goyo Hunter MD;  Location: [facility] OR;  Service: Orthopedics;  Laterality: Left;    DILATION AND CURETTAGE OF UTERUS      ENDOSCOPIC GASTROCNEMIUS RECESSION Left 8/13/2018    Procedure: RECESSION, GASTROCNEMIUS;  Surgeon: Goyo Hunter MD;  Location: [facility] OR;  Service: Orthopedics;  Laterality: Left;    HYSTERECTOMY      and right oophrectomy    INJECTION OF JOINT Left 8/13/2018    Procedure: INJECTION, JOINT - Injection Platelet Rich Plasma;  Surgeon: Goyo Hunter MD;  Location: [facility] OR;  Service: Orthopedics;  Laterality: Left;    SIMPLE MASTECTOMY Bilateral 12/9/2021    Procedure: MASTECTOMY, SIMPLE;  Surgeon: Mitzi Gerber MD;  Location: [facility];  Service: General;  Laterality: Bilateral;    TONSILLECTOMY           Current Outpatient Medications:     amitriptyline (ELAVIL) 100 MG tablet, Take 1 tablet (100 mg total) by mouth every evening., Disp: 90 tablet, Rfl: 3    amLODIPine (NORVASC) 5 MG tablet, Take 1 tablet (5 mg total) by mouth once daily., Disp: 90 tablet, Rfl: 3    diazePAM (VALIUM) 5 MG tablet, Take 1 tablet (5 mg total) by mouth every 12 (twelve) hours as needed for Anxiety., Disp: 30 tablet, Rfl: 0    levothyroxine (SYNTHROID) 88 MCG tablet, Take 1 tablet (88 mcg total) by mouth before breakfast., Disp: 90 tablet, Rfl: 3    metoprolol succinate (TOPROL-XL) 25 MG 24 hr tablet, Take 1 tablet (25 mg total) by mouth once daily., Disp: 90 tablet, Rfl: 3    multivitamin Tab, Take 1 tablet by mouth once daily. , Disp: , Rfl:     omeprazole (PRILOSEC) 40 MG capsule, Take 1 capsule (40 mg total) by mouth once daily., Disp: 90 capsule, Rfl: 3    pregabalin (LYRICA) 100 MG capsule, Take 1 capsule (100 mg total) by mouth 2 (two) times daily., Disp: 180 capsule, Rfl: 0    traMADoL (ULTRAM) 50 mg tablet, Take 1 tablet (50 mg total) by mouth every 6 (six) hours as needed for Pain., Disp: 90 tablet, Rfl: 0     venlafaxine (EFFEXOR-XR) 75 MG 24 hr capsule, Take 1 capsule (75 mg total) by mouth 3 (three) times daily. Take 2 PO Q am and 1 PO Q pm, Disp: 270 capsule, Rfl: 1    vitamin D 1000 units Tab, Take 2,000 Units by mouth every evening. , Disp: , Rfl:     Review of patient's allergies indicates:   Allergen Reactions    Meperidine Itching    Adhesive      Tape    Statins-hmg-coa reductase inhibitors      Joint pains          Social History     Socioeconomic History    Marital status:     Number of children: 3   Tobacco Use    Smoking status: Former     Packs/day: 0.50     Years: 30.00     Pack years: 15.00     Types: Cigarettes     Quit date: 1997     Years since quittin.1    Smokeless tobacco: Never   Substance and Sexual Activity    Alcohol use: Yes     Alcohol/week: 7.0 standard drinks     Types: 7 Shots of liquor per week     Comment: one shot of vodka daily    Drug use: No    Sexual activity: Yes     Partners: Male     Social Determinants of Health     Financial Resource Strain: Low Risk     Difficulty of Paying Living Expenses: Not hard at all   Food Insecurity: No Food Insecurity    Worried About Running Out of Food in the Last Year: Never true    Ran Out of Food in the Last Year: Never true   Transportation Needs: No Transportation Needs    Lack of Transportation (Medical): No    Lack of Transportation (Non-Medical): No   Physical Activity: Insufficiently Active    Days of Exercise per Week: 2 days    Minutes of Exercise per Session: 50 min   Stress: Stress Concern Present    Feeling of Stress : To some extent   Social Connections: Unknown    Frequency of Communication with Friends and Family: More than three times a week    Frequency of Social Gatherings with Friends and Family: Once a week    Active Member of Clubs or Organizations: Yes    Attends Club or Organization Meetings: More than 4 times per year    Marital Status:    Housing Stability: Low Risk     Unable to Pay for Housing in  the Last Year: No    Number of Places Lived in the Last Year: 1    Unstable Housing in the Last Year: No       Family History   Problem Relation Age of Onset    Stroke Mother     Alzheimer's disease Mother     Pancreatic cancer Father     Cancer Father         Pancreatic mets to liver    Cancer Sister         astrocytoma    Melanoma Daughter     Cancer Daughter         malignant melanoma    Cancer Brother         skin cancer       Review of Systems   Constitutional:  Negative for activity change, appetite change, chills, fatigue, fever and unexpected weight change.   HENT:  Negative for ear pain, facial swelling, hearing loss, mouth sores, nosebleeds, sore throat, trouble swallowing and voice change.    Eyes:  Negative for pain and visual disturbance.   Respiratory:  Negative for cough, choking and shortness of breath.    Cardiovascular:  Negative for chest pain, palpitations and leg swelling.   Gastrointestinal:  Negative for abdominal pain, constipation, diarrhea, nausea and vomiting.   Musculoskeletal:  Negative for arthralgias, back pain, gait problem, myalgias, neck pain and neck stiffness.   Skin:  Negative for rash and wound.   Allergic/Immunologic: Negative for immunocompromised state.   Neurological:  Negative for dizziness, facial asymmetry, speech difficulty, weakness, light-headedness, numbness and headaches.   Hematological:  Negative for adenopathy. Does not bruise/bleed easily.   Psychiatric/Behavioral:  Negative for dysphoric mood. The patient is not nervous/anxious.      Objective:     Physical Exam  Vitals reviewed.   Constitutional:       General: She is not in acute distress.     Appearance: She is well-developed.   HENT:      Head: Normocephalic and atraumatic.      Jaw: No trismus.      Salivary Glands: Right salivary gland is not diffusely enlarged or tender. Left salivary gland is not diffusely enlarged or tender.      Comments: Salivary glands - there are no lesions or asymmetric findings in  the submandibular or parotid glands     Right Ear: Hearing, tympanic membrane, ear canal and external ear normal.      Left Ear: Hearing, tympanic membrane, ear canal and external ear normal.      Nose: No nasal deformity or rhinorrhea.      Mouth/Throat:      Mouth: No oral lesions.      Tongue: No lesions.      Palate: No mass and lesions.      Pharynx: Uvula midline.      Comments: NP: No lesions of posterior wall  OP: No lesions of posterior wall or BOT. BOT is soft to palpation  Larynx: No lesions of glottic or supraglottic larynx. Normal vocal fold mobility   HP: No lesions of pyriform sinuses or postcricoid region  Mirror examination was performed.    Eyes:      General: Lids are normal.      Extraocular Movements: Extraocular movements intact.      Conjunctiva/sclera:      Right eye: Right conjunctiva is not injected. No chemosis.     Left eye: Left conjunctiva is not injected. No chemosis.  Neck:      Thyroid: No thyroid mass or thyromegaly.      Vascular: No JVD.      Trachea: Phonation normal. No tracheal deviation.   Pulmonary:      Effort: Pulmonary effort is normal. No accessory muscle usage or respiratory distress.      Breath sounds: No stridor.   Musculoskeletal:         General: No tenderness.      Cervical back: Full passive range of motion without pain.   Lymphadenopathy:      Cervical: No cervical adenopathy.      Right cervical: No deep or posterior cervical adenopathy.     Left cervical: No deep or posterior cervical adenopathy.   Skin:     Findings: No erythema, lesion or rash.      Nails: There is no clubbing.   Neurological:      Mental Status: She is alert and oriented to person, place, and time.      Cranial Nerves: No cranial nerve deficit or facial asymmetry.      Motor: No weakness.      Gait: Gait normal.   Psychiatric:         Speech: Speech normal.         Behavior: Behavior is cooperative.          Component      Latest Ref Rng & Units 4/20/2023 4/4/2022 7/29/2021   PTH      9.0 -  77.0 pg/mL 102.2 (H) 51.3 94.6 (H)     9/2 trip  10/3 UP  11/early [place]    Parathyroid CT 7/14/23:  1. 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  2. Small appearing bilateral thyroid glands.  In the expected location of the parathyroid glands posterior to the thyroid gland however no abnormally enhancing masses are visualized.    Component      Latest Ref Rng & Units 4/20/2023 9/13/2022 4/4/2022 3/2/2022   Calcium      8.4 - 10.2 mg/dL 10.6 (H) 10.3 (H) 10.6 (H) 9.3     Component      Latest Ref Rng & Units 9/15/2021 7/29/2021 6/9/2021 11/4/2020   Calcium      8.4 - 10.2 mg/dL 10.1 10.7 (H) 10.9 (H) 9.9     Component      Latest Ref Rng & Units 9/30/2019 8/3/2018   Calcium      8.4 - 10.2 mg/dL 10.2 10.4 (H)     Assessment & Plan:       Problem List Items Addressed This Visit       Hyperparathyroidism - Primary     Lauren Junior is a 77 y.o. female who presents with hypercalcemia and presumed primary hyperparathyroidism. Her calcium levels are consistently elevated, and her PTH is also elevated in the  range. She appears to localize on parathyroid CT to a lesion in the suprasternal notch.     We discussed the management of primary hyperparathyroidism and the likely etiology, with a single hyperfunctioning adenoma, versus double adenoma/multi-gland disease or four gland hyperplasia.    I have offered minimally invasive parathyroidectomy, employing the rapid parathyroid hormone assay. If the rapid assay is used, then serial blood draws will be accomplished until the PTH level has dropped at least 50% AND into the normal range. Sometimes a four gland exploration is required.      I explained the procedure and noted that the patient may spend one night in the hospital so that we can monitor the calcium levels and determine if the patient needs  supplementation while the remaining glands recover their function, as they have likely suppressed by the hyperfunctioning adenoma.  I then discussed the risks, benefits, indications, and alternatives of minimally invasive parathyroidectomy.  The risks were noted to include, but not be limited to, infection, bleeding, scarring, hoarseness from injury to the recurrent laryngeal nerve or superior laryngeal nerve, persistent hyperparathyroidism, hypoparathyroidism and hypocalcemia, and the need for additional procedures.  We discussed how it is not uncommon for there to be transient hypoparathyroidism and hypocalcemia following surgery for primary hyperparathyroidism while we await return of function of the other glands, meaning that she may have surgery for high calcium levels and be discharged on calcium supplements.  She expressed understanding.  I discussed that the benefit would be surgical correction of primary hyperparathyroidism. The chief alternative would be four gland exploration, and we will pursue this if there is not a clear adenoma or radiotracer gradient identified, or if the PTH levels do not decrease and normalize. This operation could take anywhere from 30 minutes to over 4 hours, depending on the anatomy and gland issues.  Time was allowed for questions, and all questions were answered to the patient's apparent satisfaction.  Informed consent was obtained.     Surgery has been scheduled at [facility] on 9/14/23.           Relevant Orders    Case Request Operating Room: PARATHYROIDECTOMY (Completed)

## 2023-07-24 ENCOUNTER — TELEPHONE (OUTPATIENT)
Dept: HEMATOLOGY/ONCOLOGY | Facility: CLINIC | Age: 78
End: 2023-07-24
Payer: MEDICARE

## 2023-07-24 NOTE — TELEPHONE ENCOUNTER
Assisted pt rescheduling post op appt with Dr Morton.     ----- Message from Cinthya Huizar RN sent at 7/24/2023 10:14 AM CDT -----    ----- Message -----  From: Elena Ho, Patient Care Assistant  Sent: 7/24/2023  10:09 AM CDT  To: Praveen LEE Staff    Type: Needs Medical Advice  Who Called:  montana Upton Call Back Number: [phone]    Additional Information: patient states she needs to reschedule her 10/13 follow up , please call to further discuss thank you

## 2023-07-25 ENCOUNTER — OFFICE VISIT (OUTPATIENT)
Dept: FAMILY MEDICINE | Facility: CLINIC | Age: 78
End: 2023-07-25
Payer: MEDICARE

## 2023-07-25 VITALS
OXYGEN SATURATION: 95 % | HEART RATE: 67 BPM | WEIGHT: 233.69 LBS | HEIGHT: 68 IN | SYSTOLIC BLOOD PRESSURE: 128 MMHG | BODY MASS INDEX: 35.42 KG/M2 | DIASTOLIC BLOOD PRESSURE: 84 MMHG

## 2023-07-25 DIAGNOSIS — I10 ESSENTIAL HYPERTENSION: ICD-10-CM

## 2023-07-25 DIAGNOSIS — Z86.79 HISTORY OF ATRIAL FIBRILLATION: ICD-10-CM

## 2023-07-25 DIAGNOSIS — E66.01 CLASS 2 SEVERE OBESITY DUE TO EXCESS CALORIES WITH SERIOUS COMORBIDITY AND BODY MASS INDEX (BMI) OF 35.0 TO 35.9 IN ADULT: ICD-10-CM

## 2023-07-25 DIAGNOSIS — E78.2 MIXED HYPERLIPIDEMIA: ICD-10-CM

## 2023-07-25 DIAGNOSIS — E21.3 HYPERPARATHYROIDISM: Primary | ICD-10-CM

## 2023-07-25 DIAGNOSIS — E03.9 ACQUIRED HYPOTHYROIDISM: ICD-10-CM

## 2023-07-25 PROBLEM — E66.811 CLASS 1 OBESITY DUE TO EXCESS CALORIES WITH SERIOUS COMORBIDITY AND BODY MASS INDEX (BMI) OF 31.0 TO 31.9 IN ADULT: Status: RESOLVED | Noted: 2022-03-07 | Resolved: 2023-07-25

## 2023-07-25 PROBLEM — E66.812 CLASS 2 SEVERE OBESITY DUE TO EXCESS CALORIES WITH SERIOUS COMORBIDITY AND BODY MASS INDEX (BMI) OF 35.0 TO 35.9 IN ADULT: Status: ACTIVE | Noted: 2022-03-07

## 2023-07-25 PROBLEM — E66.09 CLASS 1 OBESITY DUE TO EXCESS CALORIES WITH SERIOUS COMORBIDITY AND BODY MASS INDEX (BMI) OF 31.0 TO 31.9 IN ADULT: Status: RESOLVED | Noted: 2022-03-07 | Resolved: 2023-07-25

## 2023-07-25 PROCEDURE — 99999 PR PBB SHADOW E&M-EST. PATIENT-LVL III: CPT | Mod: PBBFAC,,, | Performed by: INTERNAL MEDICINE

## 2023-07-25 PROCEDURE — 99999 PR PBB SHADOW E&M-EST. PATIENT-LVL III: ICD-10-PCS | Mod: PBBFAC,,, | Performed by: INTERNAL MEDICINE

## 2023-07-25 PROCEDURE — 99214 PR OFFICE/OUTPT VISIT, EST, LEVL IV, 30-39 MIN: ICD-10-PCS | Mod: S$PBB,,, | Performed by: INTERNAL MEDICINE

## 2023-07-25 PROCEDURE — 99213 OFFICE O/P EST LOW 20 MIN: CPT | Mod: PBBFAC,PO | Performed by: INTERNAL MEDICINE

## 2023-07-25 PROCEDURE — 99214 OFFICE O/P EST MOD 30 MIN: CPT | Mod: S$PBB,,, | Performed by: INTERNAL MEDICINE

## 2023-07-25 NOTE — PROGRESS NOTES
"[facility]  Primary Care   [address]       Patient ID: Lauren Junior     Chief Complaint:   Chief Complaint   Patient presents with    Follow-up     3 month         HPI: Routine Follow up and dong well.   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 She will need pre-op from [facility], but I have no objections to clearing her for his surgery as she has not been ill nor had any Chest Pain or even taking any blood thinners. Vital Signs look good.   We will address hyperlipidemia after her surgery. Consider Zetia.   She did have a recent fall and broke a bone in her Right wrist/ hand, but it has healed.     Review of Systems       Negative     Objective:      Physical Exam   Physical Exam       Obese otherwise normal     Vitals:   Vitals:    07/25/23 1522   BP: 128/84   Pulse: 67   SpO2: 95%   Weight: 106 kg (233 lb 11 oz)  Comment: refused to weigh due to eatind disorder   Height: 5' 8" (1.727 m)        Assessment:           Plan:       Lauren Junior  was seen today for follow-up and may need lab work.    Diagnoses and all orders for this visit:    Lauren was seen today for follow-up.    Diagnoses and all orders for this visit:    Hyperparathyroidism  To get surgery soon     Essential hypertension  Controlled with med     Mixed hyperlipidemia  Consider Zetia     Class 2 severe obesity due to excess calories with serious comorbidity and body mass index (BMI) of 35.0 to 35.9 in adult  Monitor    Acquired hypothyroidism  Thryroid Function Tests good     History of atrial fibrillation  Resolved          Connor Viramontes MD    "

## 2023-07-27 NOTE — ASSESSMENT & PLAN NOTE
Lauren Junior is a 77 y.o. female who presents with hypercalcemia and presumed primary hyperparathyroidism. Her calcium levels are consistently elevated, and her PTH is also elevated in the  range. She appears to localize on parathyroid CT to a lesion in the suprasternal notch.     We discussed the management of primary hyperparathyroidism and the likely etiology, with a single hyperfunctioning adenoma, versus double adenoma/multi-gland disease or four gland hyperplasia.    I have offered minimally invasive parathyroidectomy, employing the rapid parathyroid hormone assay. If the rapid assay is used, then serial blood draws will be accomplished until the PTH level has dropped at least 50% AND into the normal range. Sometimes a four gland exploration is required.      I explained the procedure and noted that the patient may spend one night in the hospital so that we can monitor the calcium levels and determine if the patient needs supplementation while the remaining glands recover their function, as they have likely suppressed by the hyperfunctioning adenoma.  I then discussed the risks, benefits, indications, and alternatives of minimally invasive parathyroidectomy.  The risks were noted to include, but not be limited to, infection, bleeding, scarring, hoarseness from injury to the recurrent laryngeal nerve or superior laryngeal nerve, persistent hyperparathyroidism, hypoparathyroidism and hypocalcemia, and the need for additional procedures.  We discussed how it is not uncommon for there to be transient hypoparathyroidism and hypocalcemia following surgery for primary hyperparathyroidism while we await return of function of the other glands, meaning that she may have surgery for high calcium levels and be discharged on calcium supplements.  She expressed understanding.  I discussed that the benefit would be surgical correction of primary hyperparathyroidism. The chief alternative would be four gland  exploration, and we will pursue this if there is not a clear adenoma or radiotracer gradient identified, or if the PTH levels do not decrease and normalize. This operation could take anywhere from 30 minutes to over 4 hours, depending on the anatomy and gland issues.  Time was allowed for questions, and all questions were answered to the patient's apparent satisfaction.  Informed consent was obtained.     Surgery has been scheduled at [facility] on 9/14/23.

## 2023-09-11 ENCOUNTER — TELEPHONE (OUTPATIENT)
Dept: HEMATOLOGY/ONCOLOGY | Facility: CLINIC | Age: 78
End: 2023-09-11
Payer: MEDICARE

## 2023-09-11 NOTE — TELEPHONE ENCOUNTER
Pt states that surgery dates and times are good.   ----- Message from Cinthya Huizar RN sent at 9/11/2023 12:03 PM CDT -----    ----- Message -----  From: Yanet Barnett  Sent: 9/11/2023  12:03 PM CDT  To: Praveen LEE Staff    Type:  Patient Returning Call    Who Called:  Patient   Who Left Message for Patient:  Adri  Does the patient know what this is regarding?:  yes  Best Call Back Number:  [phone]  Additional Information:  Patient returning missed call

## 2023-09-11 NOTE — TELEPHONE ENCOUNTER
LVM notifying pt of PAT and post op appt dates, times and locations. Left contact information for pt to call for any questions or concerns.

## 2023-09-15 ENCOUNTER — PATIENT MESSAGE (OUTPATIENT)
Dept: FAMILY MEDICINE | Facility: CLINIC | Age: 78
End: 2023-09-15
Payer: MEDICARE

## 2023-09-22 ENCOUNTER — TELEPHONE (OUTPATIENT)
Dept: HEMATOLOGY/ONCOLOGY | Facility: CLINIC | Age: 78
End: 2023-09-22
Payer: MEDICARE

## 2023-09-22 NOTE — TELEPHONE ENCOUNTER
Noted----- Message from Cinthya Huizar RN sent at 9/22/2023 12:22 PM CDT -----  Contact: Pt    ----- Message -----  From: Nini Vega  Sent: 9/22/2023  11:24 AM CDT  To: Praveen LEE Staff    Type:  Patient Returning Call    Who Called:Pt  Who Left Message for Patient: Adri  Does the patient know what this is regarding?:yes  Would the patient rather a call back or a response via [facility]? call  Best Call Back Number:[phone]  Additional Information: Pt is returning Adri's call. Pt states that 12/01/2023 is fine with her. She states that Adri can call her back if she needs something else.

## 2023-09-23 DIAGNOSIS — M79.7 FIBROMYALGIA: ICD-10-CM

## 2023-09-23 NOTE — TELEPHONE ENCOUNTER
No care due was identified.  [facility] Embedded Care Due Messages. Reference number: [id number].   9/23/2023 3:35:38 PM CDT

## 2023-09-24 RX ORDER — PREGABALIN 100 MG/1
100 CAPSULE ORAL 2 TIMES DAILY
Qty: 180 CAPSULE | Refills: 3 | Status: SHIPPED | OUTPATIENT
Start: 2023-09-24

## 2023-11-17 ENCOUNTER — TELEPHONE (OUTPATIENT)
Dept: HEMATOLOGY/ONCOLOGY | Facility: CLINIC | Age: 78
End: 2023-11-17
Payer: MEDICARE

## 2023-11-17 NOTE — TELEPHONE ENCOUNTER
Pt calling to notify that Dr Morton has said her post op appt may be virtual.  Pt felling well, is eating, currently in recovery at [facility].

## 2023-12-11 ENCOUNTER — PATIENT MESSAGE (OUTPATIENT)
Dept: FAMILY MEDICINE | Facility: CLINIC | Age: 78
End: 2023-12-11
Payer: MEDICARE

## 2023-12-11 DIAGNOSIS — E78.2 MIXED HYPERLIPIDEMIA: Primary | ICD-10-CM

## 2023-12-12 ENCOUNTER — PATIENT MESSAGE (OUTPATIENT)
Dept: FAMILY MEDICINE | Facility: CLINIC | Age: 78
End: 2023-12-12
Payer: MEDICARE

## 2023-12-12 ENCOUNTER — OFFICE VISIT (OUTPATIENT)
Dept: OTOLARYNGOLOGY | Facility: CLINIC | Age: 78
End: 2023-12-12
Payer: MEDICARE

## 2023-12-12 DIAGNOSIS — Z09 POSTOP CHECK: ICD-10-CM

## 2023-12-12 DIAGNOSIS — E21.3 HYPERPARATHYROIDISM: Primary | ICD-10-CM

## 2023-12-12 DIAGNOSIS — E83.52 HYPERCALCEMIA: Primary | ICD-10-CM

## 2023-12-12 PROCEDURE — 99024 PR POST-OP FOLLOW-UP VISIT: ICD-10-PCS | Mod: 95,POP,, | Performed by: OTOLARYNGOLOGY

## 2023-12-12 PROCEDURE — 99024 POSTOP FOLLOW-UP VISIT: CPT | Mod: 95,POP,, | Performed by: OTOLARYNGOLOGY

## 2023-12-12 NOTE — PROGRESS NOTES
[facility]    Subjective:       Patient ID: Lauren Junior is a 78 y.o. female.    Chief Complaint: No chief complaint on file.    HPI    Treatment History:  1) Right inferior parathyroidectomy, with deep cervical lymph node biopsy; 11/17/23    Lauren Junior is a 78 y.o. female who presents for followup. She was initially referred by Dr. Viramontes and Dr. Hatfield for consideration of parathyroidectomy in the setting of primary hyperparathyroidism. She is doing well postoperatively, with normal voice and swallowing. She reports that she feels more energized and less depressed after surgery, particularly over the past few days. She feels like working in her yard again.    She is to see Dr. Viramontes later this week and will have some labs. She is pleased with her scar and reports no pain after surgery.     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    Deficiency of clotting factor     Factor 11    Elevated blood pressure 09/09/2015    Encounter for blood transfusion 1967    Fibromyalgia     History of hepatitis C     s/p Interferon & Ribavarin     Hyperparathyroidism 11/2023    Hypertension     Hypothyroidism 09/09/2015    Kidney stones     Migraines     Thoracic outlet syndrome     s/p B rib resections       Past Surgical History:   Procedure Laterality Date    ADENOIDECTOMY      big toe pinned  2012    wilmer rib resection      CYSTOSCOPY W/ STONE MANIPULATION      DEBRIDEMENT OF TENDON Left 08/13/2018    Procedure: DEBRIDEMENT, TENDON ACHILLES;  Surgeon: Goyo Hunter MD;  Location: [facility] OR;  Service: Orthopedics;  Laterality: Left;    DILATION AND CURETTAGE OF UTERUS      ENDOSCOPIC GASTROCNEMIUS RECESSION Left 08/13/2018    Procedure: RECESSION, GASTROCNEMIUS;  Surgeon: Goyo Hunter MD;  Location: [facility] OR;  Service: Orthopedics;   Laterality: Left;    HYSTERECTOMY      and right oophrectomy    INJECTION OF JOINT Left 08/13/2018    Procedure: INJECTION, JOINT - Injection Platelet Rich Plasma;  Surgeon: Goyo Hunter MD;  Location: [facility];  Service: Orthopedics;  Laterality: Left;    KNEE ARTHROSCOPY W/ MENISCECTOMY Right 2023    LUMPECTOMY, BREAST Right     OPEN REDUCTION AND INTERNAL FIXATION (ORIF) OF INJURY OF WRIST Left     PARATHYROIDECTOMY Bilateral 11/17/2023    Procedure: PARATHYROIDECTOMY;  Surgeon: Pedro Pablo Morton MD;  Location: [facility] OR;  Service: ENT;  Laterality: Bilateral;    SIMPLE MASTECTOMY Bilateral 12/09/2021    Procedure: MASTECTOMY, SIMPLE;  Surgeon: Mitzi Gerber MD;  Location: [facility];  Service: General;  Laterality: Bilateral;    TONSILLECTOMY      WRIST HARDWARE REMOVAL Left          Current Outpatient Medications:     amitriptyline (ELAVIL) 100 MG tablet, Take 1 tablet (100 mg total) by mouth every evening., Disp: 90 tablet, Rfl: 3    amLODIPine (NORVASC) 5 MG tablet, Take 1 tablet (5 mg total) by mouth once daily., Disp: 90 tablet, Rfl: 3    calcium carbonate (TUMS ULTRA) 400 mg calcium (1,000 mg) Chew, Take 1 tablet (400 mg total) by mouth 2 (two) times daily., Disp: 60 tablet, Rfl: 11    diazePAM (VALIUM) 5 MG tablet, Take 1 tablet (5 mg total) by mouth every 12 (twelve) hours as needed for Anxiety., Disp: 30 tablet, Rfl: 0    HYDROcodone-acetaminophen (NORCO) 5-325 mg per tablet, Take 1 tablet by mouth every 6 (six) hours as needed for Pain., Disp: 28 tablet, Rfl: 0    levothyroxine (SYNTHROID) 88 MCG tablet, Take 1 tablet (88 mcg total) by mouth before breakfast., Disp: 90 tablet, Rfl: 3    metoprolol succinate (TOPROL-XL) 25 MG 24 hr tablet, Take 1 tablet (25 mg total) by mouth once daily., Disp: 90 tablet, Rfl: 3    multivitamin Tab, Take 1 tablet by mouth once daily. , Disp: , Rfl:     omeprazole (PRILOSEC) 40 MG capsule, Take 1 capsule (40 mg total) by mouth once daily. (Patient taking  differently: Take 40 mg by mouth every evening.), Disp: 90 capsule, Rfl: 3    pregabalin (LYRICA) 100 MG capsule, TAKE 1 CAPSULE(100 MG) BY MOUTH TWICE DAILY, Disp: 180 capsule, Rfl: 3    traMADoL (ULTRAM) 50 mg tablet, Take 1 tablet (50 mg total) by mouth every 6 (six) hours as needed for Pain., Disp: 90 tablet, Rfl: 0    venlafaxine (EFFEXOR-XR) 75 MG 24 hr capsule, Take 1 capsule (75 mg total) by mouth 3 (three) times daily. Take 2 PO Q am and 1 PO Q pm, Disp: 270 capsule, Rfl: 1    vitamin D 1000 units Tab, Take 2,000 Units by mouth every evening. , Disp: , Rfl:     Review of patient's allergies indicates:   Allergen Reactions    Meperidine Itching    Adhesive      Tape    Statins-hmg-coa reductase inhibitors      Joint pains          Social History     Socioeconomic History    Marital status:     Number of children: 3   Tobacco Use    Smoking status: Former     Current packs/day: 0.00     Average packs/day: 0.5 packs/day for 30.0 years (15.0 ttl pk-yrs)     Types: Cigarettes     Start date: 1967     Quit date: 1997     Years since quittin.5    Smokeless tobacco: Never   Substance and Sexual Activity    Alcohol use: Yes     Alcohol/week: 7.0 standard drinks of alcohol     Types: 7 Shots of liquor per week     Comment: one shot of vodka daily    Drug use: No    Sexual activity: Yes     Partners: Male     Social Determinants of Health     Financial Resource Strain: Low Risk  (2023)    Overall Financial Resource Strain (CARDIA)     Difficulty of Paying Living Expenses: Not hard at all   Food Insecurity: No Food Insecurity (2023)    Hunger Vital Sign     Worried About Running Out of Food in the Last Year: Never true     Ran Out of Food in the Last Year: Never true   Transportation Needs: No Transportation Needs (2023)    PRAPARE - Transportation     Lack of Transportation (Medical): No     Lack of Transportation (Non-Medical): No   Physical Activity: Insufficiently Active  (12/8/2023)    Exercise Vital Sign     Days of Exercise per Week: 3 days     Minutes of Exercise per Session: 40 min   Stress: Stress Concern Present (12/8/2023)    [employer] - Occupational Stress Questionnaire     Feeling of Stress : To some extent   Social Connections: Unknown (12/8/2023)    Social Connection and Isolation Panel [NHANES]     Frequency of Communication with Friends and Family: More than three times a week     Frequency of Social Gatherings with Friends and Family: Once a week     Active Member of Clubs or Organizations: Yes     Attends Club or Organization Meetings: More than 4 times per year     Marital Status:    Housing Stability: Low Risk  (12/8/2023)    Housing Stability Vital Sign     Unable to Pay for Housing in the Last Year: No     Number of Places Lived in the Last Year: 1     Unstable Housing in the Last Year: No       Family History   Problem Relation Age of Onset    Stroke Mother     Alzheimer's disease Mother     Pancreatic cancer Father     Cancer Father         Pancreatic mets to liver    Cancer Sister         astrocytoma    Melanoma Daughter     Cancer Daughter         malignant melanoma    Cancer Brother         skin cancer       Review of Systems   Constitutional:  Negative for activity change, appetite change, chills, fatigue, fever and unexpected weight change.   HENT: Negative.  Negative for ear pain, facial swelling, hearing loss, mouth sores, nosebleeds, sore throat, trouble swallowing and voice change.    Eyes: Negative.  Negative for pain and visual disturbance.   Respiratory:  Positive for wheezing. Negative for cough, choking and shortness of breath.    Cardiovascular: Negative.  Negative for chest pain, palpitations and leg swelling.   Gastrointestinal: Negative.  Negative for abdominal pain, constipation, diarrhea, nausea and vomiting.   Genitourinary: Negative.    Musculoskeletal:  Negative for arthralgias, back pain, gait  problem, myalgias, neck pain and neck stiffness.   Skin: Negative.  Negative for rash and wound.   Allergic/Immunologic: Negative.  Negative for immunocompromised state.   Neurological: Negative.  Negative for dizziness, facial asymmetry, speech difficulty, weakness, light-headedness, numbness and headaches.   Hematological:  Negative for adenopathy. Does not bruise/bleed easily.   Psychiatric/Behavioral:  Positive for decreased concentration. Negative for dysphoric mood. The patient is not nervous/anxious.            Answers submitted by the patient for this visit:  Review of Symptoms Questionnaire  (Submitted on 12/8/2023)  Fatigue (Tiredness)?: Yes  None of these: Yes  None of these : Yes  wheezing: Yes  cough: Yes  None of these : Yes  None of these: Yes  None of these: Yes  None of these : Yes  None of these: Yes  None of these: Yes  Bruises or bleeds easily: Yes  decreased concentration: Yes  Feeling depressed?: Yes  nervous/ anxious: Yes    Objective:     Physical Exam  Constitutional:       Appearance: Normal appearance.      Comments: Voice clear without diplophonia     HENT:      Head: Normocephalic and atraumatic.   Neck:     Neurological:      Mental Status: She is alert.            Component      Latest Ref Rng & Units 4/20/2023 4/4/2022 7/29/2021   PTH      9.0 - 77.0 pg/mL 102.2 (H) 51.3 94.6 (H)       Parathyroid CT 7/14/23:  1. 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  2. Small appearing bilateral thyroid glands.  In the expected location of the parathyroid glands posterior to the thyroid gland however no abnormally enhancing masses are visualized.    Component      Latest Ref Rng & Units 4/20/2023 9/13/2022 4/4/2022 3/2/2022   Calcium      8.4 - 10.2 mg/dL 10.6 (H) 10.3 (H) 10.6 (H) 9.3     Component      Latest Ref Rng & Units 9/15/2021  7/29/2021 6/9/2021 11/4/2020   Calcium      8.4 - 10.2 mg/dL 10.1 10.7 (H) 10.9 (H) 9.9     Component      Latest Ref Rng & Units 9/30/2019 8/3/2018   Calcium      8.4 - 10.2 mg/dL 10.2 10.4 (H)     Path 11/17/23:  1. RIGHT INFERIOR PARATHYROID, EXCISION:   - PARATHYROID ADENOMA IN A BACKGROUND OF THYMIC REMNANT.   - SMALL BRANCHIAL CLEFT/THYMIC CYST.     2. RIGHT PARATRACHEAL SOFT TISSUE:   - THYMIC REMNANT WITH BENIGN CYST.   _________________________________   Assessment & Plan:       Problem List Items Addressed This Visit       RESOLVED: Hyperparathyroidism - Primary     She is doing well after resection of a parathyroid adenoma. We await postop labs but her symptoms are notably improved. She may follow up as needed.           Other Visit Diagnoses       Postop check

## 2023-12-12 NOTE — ASSESSMENT & PLAN NOTE
She is doing well after resection of a parathyroid adenoma. We await postop labs but her symptoms are notably improved. She may follow up as needed.

## 2023-12-15 ENCOUNTER — PATIENT MESSAGE (OUTPATIENT)
Dept: OTOLARYNGOLOGY | Facility: CLINIC | Age: 78
End: 2023-12-15
Payer: MEDICARE

## 2023-12-15 ENCOUNTER — OFFICE VISIT (OUTPATIENT)
Dept: FAMILY MEDICINE | Facility: CLINIC | Age: 78
End: 2023-12-15
Payer: MEDICARE

## 2023-12-15 VITALS
BODY MASS INDEX: 35.88 KG/M2 | OXYGEN SATURATION: 97 % | SYSTOLIC BLOOD PRESSURE: 122 MMHG | DIASTOLIC BLOOD PRESSURE: 84 MMHG | HEART RATE: 88 BPM | HEIGHT: 68 IN

## 2023-12-15 DIAGNOSIS — K21.9 GASTROESOPHAGEAL REFLUX DISEASE WITHOUT ESOPHAGITIS: ICD-10-CM

## 2023-12-15 DIAGNOSIS — R05.3 CHRONIC COUGH: ICD-10-CM

## 2023-12-15 DIAGNOSIS — I10 ESSENTIAL HYPERTENSION: ICD-10-CM

## 2023-12-15 DIAGNOSIS — F41.9 ANXIETY: ICD-10-CM

## 2023-12-15 DIAGNOSIS — E21.3 HYPERPARATHYROIDISM: Primary | ICD-10-CM

## 2023-12-15 DIAGNOSIS — E66.01 CLASS 2 SEVERE OBESITY DUE TO EXCESS CALORIES WITH SERIOUS COMORBIDITY AND BODY MASS INDEX (BMI) OF 35.0 TO 35.9 IN ADULT: ICD-10-CM

## 2023-12-15 DIAGNOSIS — E03.9 ACQUIRED HYPOTHYROIDISM: ICD-10-CM

## 2023-12-15 DIAGNOSIS — E78.2 MIXED HYPERLIPIDEMIA: ICD-10-CM

## 2023-12-15 PROBLEM — S52.90XA RADIUS FRACTURE: Status: RESOLVED | Noted: 2022-03-07 | Resolved: 2023-12-15

## 2023-12-15 PROBLEM — G56.00 ACUTE CARPAL TUNNEL SYNDROME: Status: RESOLVED | Noted: 2017-06-14 | Resolved: 2023-12-15

## 2023-12-15 PROCEDURE — 99999 PR PBB SHADOW E&M-EST. PATIENT-LVL III: CPT | Mod: PBBFAC,,, | Performed by: INTERNAL MEDICINE

## 2023-12-15 PROCEDURE — 99999 PR PBB SHADOW E&M-EST. PATIENT-LVL III: ICD-10-PCS | Mod: PBBFAC,,, | Performed by: INTERNAL MEDICINE

## 2023-12-15 PROCEDURE — 99214 OFFICE O/P EST MOD 30 MIN: CPT | Mod: S$PBB,,, | Performed by: INTERNAL MEDICINE

## 2023-12-15 PROCEDURE — 99214 PR OFFICE/OUTPT VISIT, EST, LEVL IV, 30-39 MIN: ICD-10-PCS | Mod: S$PBB,,, | Performed by: INTERNAL MEDICINE

## 2023-12-15 PROCEDURE — 99213 OFFICE O/P EST LOW 20 MIN: CPT | Mod: PBBFAC,PO | Performed by: INTERNAL MEDICINE

## 2023-12-15 RX ORDER — ALBUTEROL SULFATE 90 UG/1
2 AEROSOL, METERED RESPIRATORY (INHALATION) EVERY 4 HOURS PRN
Qty: 18 G | Refills: 3 | Status: SHIPPED | OUTPATIENT
Start: 2023-12-15 | End: 2024-01-05

## 2023-12-15 RX ORDER — VENLAFAXINE 37.5 MG/1
37.5 TABLET ORAL 3 TIMES DAILY
Qty: 90 TABLET | Refills: 11 | Status: SHIPPED | OUTPATIENT
Start: 2023-12-15 | End: 2024-02-20 | Stop reason: SDUPTHER

## 2023-12-15 NOTE — PROGRESS NOTES
"[facility]  Primary Care   [address] [facility]       Patient ID: Lauren Junior     Chief Complaint:   Chief Complaint   Patient presents with    Follow-up     Cholesterol follow up           HPI:  Routine office visit, and since our last office visit, she had a successful parathyroidectomy for her hyperparathyroidism.  PTH levels dropped to normal shortly after surgery and her calcium decreased a few days later.  Her endocrinologist would like me to check another calcium level today since we are also going to check her lipid panels.  She is due for TSH I will add that on there.  After the surgery, it took a few weeks for her memory to normalize.  This is due to the hypercalcemia and thankfully she is back to baseline.  She has had a cough though for a few months.  It is mainly dry in she can occasionally expectorate some clear phlegm.  She does not feel ill in many of her friends have the same cough.  I am going to see how she responds to an albuterol inhaler.  Vital signs look good.  I did review the available labs and they also look good.  She does want to start weaning off the venlafaxine because it can make her tired so we will switch her to plain venlafaxine 37.5 mg take 2 in the morning and 1 in the evening and she will step down in 2-4 week increments.    Review of Systems       Cough     Objective:      Physical Exam   Physical Exam       Obese   Lungs clear     Vitals:   Vitals:    12/15/23 1006   BP: 122/84   Pulse: 88   SpO2: 97%   Weight: Comment: refused due to an eating disorder   Height: 5' 8" (1.727 m)        Assessment:           Plan:       Lauren Junior  was seen today for follow-up and may need lab work.    Diagnoses and all orders for this visit:    Lauren was seen today for follow-up.    Diagnoses and all orders for this visit:    Hyperparathyroidism  -     Calcium, Ionized; Future  -     CALCIUM; Future  Labs today     Anxiety  -     venlafaxine (EFFEXOR) 37.5 MG Tab; Take " 1 tablet (37.5 mg total) by mouth 3 (three) times daily. Take 2 PO Qam and 1 PO Qpm  Monitor as she weans     Mixed hyperlipidemia  -     Lipid Panel; Future  Check labs    Consider Zetia     Acquired hypothyroidism  -     TSH; Future  Check labs      Chronic cough  -     albuterol (VENTOLIN HFA) 90 mcg/actuation inhaler; Inhale 2 puffs into the lungs every 4 (four) hours as needed for Wheezing. Rescue    Essential hypertension  Controlled with med     Class 2 severe obesity due to excess calories with serious comorbidity and body mass index (BMI) of 35.0 to 35.9 in adult  Monitor     Gastroesophageal reflux disease without esophagitis  Controlled with med          Connor Viramontes MD

## 2023-12-21 ENCOUNTER — PATIENT MESSAGE (OUTPATIENT)
Dept: FAMILY MEDICINE | Facility: CLINIC | Age: 78
End: 2023-12-21
Payer: MEDICARE

## 2023-12-21 DIAGNOSIS — E78.2 MIXED HYPERLIPIDEMIA: Primary | ICD-10-CM

## 2023-12-21 RX ORDER — EZETIMIBE 10 MG/1
10 TABLET ORAL DAILY
Qty: 90 TABLET | Refills: 3 | Status: SHIPPED | OUTPATIENT
Start: 2023-12-21 | End: 2024-12-20

## 2023-12-23 ENCOUNTER — PATIENT MESSAGE (OUTPATIENT)
Dept: FAMILY MEDICINE | Facility: CLINIC | Age: 78
End: 2023-12-23
Payer: MEDICARE

## 2024-01-08 ENCOUNTER — PATIENT MESSAGE (OUTPATIENT)
Dept: FAMILY MEDICINE | Facility: CLINIC | Age: 79
End: 2024-01-08
Payer: MEDICARE

## 2024-01-08 DIAGNOSIS — R10.2 VULVAR PAIN: Primary | ICD-10-CM

## 2024-01-09 NOTE — TELEPHONE ENCOUNTER
L.o.v 12-15-23  N.o.v 6-14-24    Pt is looking or a referral.  Estella has already pend the referral

## 2024-01-11 DIAGNOSIS — Z00.00 ENCOUNTER FOR MEDICARE ANNUAL WELLNESS EXAM: ICD-10-CM

## 2024-01-17 NOTE — PROGRESS NOTES
[facility] - UROGYNECOLOGY  [address] [address]    Lauren Junior  [id number]  1945  January 18, 2024    Consulting Physician: Connor Viramontes MD   GYN: none  Primary M.D.: Connor Viramontes MD    Chief Complaint   Patient presents with    bladder pressure      When urinating there's a tingling/painful sensation in clitoral or opening to the urethra.      HPI:     1)  UI:  (+) KRISTEN (gym, cough, sneeze). (--) UUI.  Improved with 40 lb weight loss but has restarted with 20 lb weight regain.  (+) pads--only when out of home.  Daytime frequency: Q 3 hours.  Nocturia: Yes: 1/night, around 6 AM--can have UUI on way to [place].   (--) dysuria,  (--) hematuria,  (--) frequent UTIs.  (+) complete bladder emptying.  --starting  12/2023:  felt tingling sensation before and during urination--also some tingling in hands; + increased U/F with significant discomfort--feels like painful orgasm--lasted about 20 min; has had some other similar episodes since then  --did not have UTI check  --has not tried meds    2)  POP:  Absent.   (--) vaginal bleeding. (--) vaginal discharge. (--) sexually active-- with ED.  (--) h/o dyspareunia.   (--)  Vaginal dryness.  (--) vaginal estrogen use.     3)  BM:  (+) constipation/straining. Takes miralax QOD.  Tried fiber but didn't like/caused gas. +straining.  (--) chronic diarrhea. (--) hematochezia.  (--) fecal incontinence.  (--) fecal smearing/urgency.  (+) complete evacuation.     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 No chemo or radiation. +E2 sens.   --h/o blood transfusion: ABL from thoracic outlet syndrome surgery  --afib: no blood thinners due to factor 11 clotting deficiency and was bleeding too much; no major EBL from other surgeries  --hyperparathyroidism: s/p parathyroidectomy for her hyperparathyroidism.  PTH levels dropped to normal shortly after surgery and her calcium decreased a few days later.   Lab Results   Component Value Date    TSH 3.460 12/15/2023     Lab Results   Component Value Date    CALCIUM 9.0 12/15/2023    PHOS 3.1 09/15/2021   --fibromyalgia: lyrica + effexor  --migraines: lyrica + effexor    Past Surgical History  Past Surgical History:   Procedure Laterality Date    ADENOIDECTOMY      big toe pinned  2012    wilmer rib resection      CYSTOSCOPY W/ STONE MANIPULATION      DEBRIDEMENT OF TENDON Left 08/13/2018    Procedure: DEBRIDEMENT, TENDON ACHILLES;  Surgeon: Goyo Hunter MD;  Location: [facility] OR;  Service: Orthopedics;  Laterality: Left;    DILATION AND CURETTAGE OF UTERUS      ENDOSCOPIC GASTROCNEMIUS RECESSION Left 08/13/2018    Procedure: RECESSION, GASTROCNEMIUS;  Surgeon: Goyo Hunter MD;  Location: [facility] OR;  Service: Orthopedics;  Laterality: Left;    HYSTERECTOMY      and right oophrectomy    INJECTION OF JOINT Left 08/13/2018    Procedure: INJECTION, JOINT - Injection Platelet Rich Plasma;  Surgeon: Goyo Hunter MD;  Location: [facility] OR;  Service: Orthopedics;  Laterality: Left;    KNEE ARTHROSCOPY W/ MENISCECTOMY Right 2023    LUMPECTOMY, BREAST Right     OPEN REDUCTION AND INTERNAL FIXATION (ORIF) OF INJURY OF WRIST Left     PARATHYROIDECTOMY Bilateral 11/17/2023    Procedure: PARATHYROIDECTOMY;  Surgeon:  Pedro Pablo Morton MD;  Location: [facility] OR;  Service: ENT;  Laterality: Bilateral;    SIMPLE MASTECTOMY Bilateral 2021    Procedure: MASTECTOMY, SIMPLE;  Surgeon: Mitzi Gerber MD;  Location: [facility];  Service: General;  Laterality: Bilateral;    TONSILLECTOMY      WRIST HARDWARE REMOVAL Left    B thoracic outlet surgery     Hysterectomy: Yes - menorrhagia  Date: 36 yo.  Indication: menorrhagia  Type: xlap/Pfannenstiel   Cervix present: No  Ovaries present: Yes - left (s/p RSO)  Other procedures at time of hysterectomy:  ?appy    Past Ob History     x 3.  C/s x 0.    Largest infant weight: 7#8oz.  no FAVD. yes episiotomy.      Gynecologic History  LMP: No LMP recorded. Patient has had a hysterectomy.  Age of menarche: 19 yo  Age of menopause: with YUVAL  Menstrual history: h/o menorrhagia  Pap test: post hyst.  History of abnormal paps: No.  History of STIs:  Yes - hep C (blood transfusion)--treated; +HPV--perianal dysplasia  Mammogram: s/p B mastectomy for breast CA; has annual US + breast exam   Colonoscopy: Date of last: .  Result:  polyp.  Repeat due:  .    DEXA:  Date of last: .  Result:  normal.  Repeat due:  per PCP.     Family History  Family History   Problem Relation Age of Onset    Stroke Mother     Alzheimer's disease Mother     Pancreatic cancer Father     Cancer Father         Pancreatic mets to liver    Cancer Sister         astrocytoma    Melanoma Daughter     Cancer Daughter         malignant melanoma    Cancer Brother         skin cancer      Colon CA: No  Breast CA: No  GYN CA: No   CA: No    Social History  Social History     Tobacco Use   Smoking Status Former    Current packs/day: 0.00    Average packs/day: 0.5 packs/day for 30.0 years (15.0 ttl pk-yrs)    Types: Cigarettes    Start date: 1967    Quit date: 1997    Years since quittin.6   Smokeless Tobacco Never     Social History     Substance and Sexual Activity   Alcohol Use Yes    Alcohol/week: 7.0  standard drinks of alcohol    Types: 7 Shots of liquor per week    Comment: one shot of vodka daily   .    Social History     Substance and Sexual Activity   Drug Use No   --not drinking as much lately    The patient is .  Resides in [place] [address].  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     Allergies  Review of patient's allergies indicates:   Allergen Reactions    Meperidine Itching    Adhesive      Tape    Statins-hmg-coa reductase inhibitors      Joint pains          Medications  Current Outpatient Medications on File Prior to Visit   Medication Sig Dispense Refill    amitriptyline (ELAVIL) 100 MG tablet Take 1 tablet (100 mg total) by mouth every evening. 90 tablet 3    amLODIPine (NORVASC) 5 MG tablet Take 1 tablet (5 mg total) by mouth once daily. 90 tablet 3    ezetimibe (ZETIA) 10 mg tablet Take 1 tablet (10 mg total) by mouth once daily. 90 tablet 3    levothyroxine (SYNTHROID) 88 MCG tablet Take 1 tablet (88 mcg total) by mouth before breakfast. 90 tablet 3    metoprolol succinate (TOPROL-XL) 25 MG 24 hr tablet Take 1 tablet (25 mg total) by mouth once daily. 90 tablet 3    multivitamin Tab Take 1 tablet by mouth once daily.       omeprazole (PRILOSEC) 40 MG capsule Take 1 capsule (40 mg total) by mouth once daily. (Patient taking differently: Take 40 mg by mouth every evening.) 90 capsule 3    pregabalin (LYRICA) 100 MG capsule TAKE 1 CAPSULE(100 MG) BY MOUTH TWICE DAILY 180 capsule 3    traMADoL (ULTRAM) 50 mg tablet Take 1 tablet (50 mg total) by mouth every 6 (six) hours as needed for Pain. 90 tablet 0    venlafaxine (EFFEXOR) 37.5 MG Tab Take 1 tablet (37.5 mg total) by mouth 3 (three) times daily. Take 2 PO Qam and 1 PO Qpm 90 tablet 11    vitamin D 1000 units Tab Take 2,000 Units by mouth every evening.       diazePAM (VALIUM) 5 MG tablet Take 1 tablet (5 mg total) by mouth every 12 (twelve) hours as needed for Anxiety. 30 tablet 0     No current  facility-administered medications on file prior to visit.       Review of Systems A 14 point ROS was reviewed with pertinent positives as noted above in the history of present illness.      Constitutional: negative  Eyes: negative  Endocrine: negative  Gastrointestinal: negative  Cardiovascular: negative  Respiratory: negative  Allergic/Immunologic: negative  Integumentary: negative  Psychiatric: negative  Musculoskeletal: negative   Ear/Nose/Throat: negative  Neurologic: negative  Genitourinary: SEE HPI  Hematologic/Lymphatic: negative   Breast: negative    Urogynecologic Exam  /78 (BP Location: Right arm, Patient Position: Sitting, BP Method: Large (Automatic))   Wt 102.9 kg (226 lb 13.7 oz)   BMI 34.49 kg/m²     GENERAL APPEARANCE:  The patient is well-developed, well-nourished.   Neck:  Supple with no thyromegaly, no carotid bruits.  Heart:  Regular rate and rhythm, no murmurs, rubs or gallops.  Lungs:  Clear.  No CVA tenderness.  Abdomen:  Soft, nontender, nondistended, no hepatosplenomegaly.  Incisions:  Pfannenstiel well-healed    PELVIC:    External genitalia:  Normal Bartholins, Skenes and labia bilaterally.  +partial regression of labia minora.   Urethra:  No caruncle, diverticulum or masses.  (+) hypermobility.    Vagina:  Atrophy (+) , no bladder masses or tender, no discharge.    Cervix:  absent  Uterus: uterus absent  Adnexa: Not palpable.    POP-Q:    Deferred.  No obvious POP present with valsalva.     NEUROLOGIC:  Cranial nerves 2 through 12 intact.  Strength 5/5.  DTRs 2+ lower extremities.  S2 through 4 normal.  Sacral reflexes intact.    EXT: TAVERAS, 2+ pulses bilaterally, no C/C/E    COUGH STRESS TEST:  positive   KEGEL: 1 /5    RECTAL:    External:  Normal, (--) hemorrhoids, (--) dovetailing.   Internal:   (--) tenderness, (--) masses, Normal resting tone, Normal active tone. Heme grossly NEG.     PVR: 30 mL    Impression    1. Dysuria    2. Bladder spasm    3. Urinary urgency    4. Female  genuine stress incontinence    5. Chronic constipation    6. Vaginal atrophy        Initial Plan  The patient was counseled regarding these issues. The patient was given a summary sheet containing each of these issues with possible options for evaluation and management. When appropriate, we also reviewed computer-generated diagrams specific to their diagnoses..  All questions were addressed to the patient's satisfaction.    1)  Stress incontinence:  --urine C&S    --POS cough stress  --Empty bladder every 3 hours.  Empty well: wait a minute, lean forward on toilet.    --Avoid dietary irritants (see sheet).  Keep diary x 3-5 days to determine your irritants.  --KEGELS: do 10 in AM and 10 in PM, holding each x 10 seconds.  When you feel urge to go, STOP, KEGEL, and when urge has passed, then go to bathroom.  Consider PT in future.    --STRESS:  Pessary vs. Sling.     2)  Urinary symptoms:  --urine C&S  --probably bladder spasms  --treat vaginal dryness  --Empty bladder every 2-3 hours.  Empty well: wait a minute, lean forward on toilet.    --Avoid dietary irritants (see sheet).  Keep diary x 3-5 days to determine your irritants.  --KEGELS: do 10 in AM and 10 in PM, holding each x 10 seconds.  When you feel urge to go, STOP, KEGEL, and when urge has passed, then go to bathroom.  Consider PT in future.  Call to make appt:    [facility] [place]   Rose Lock DPT  [address] [address]   [phone]  Phone [phone]     --for acute discomfort episodes: try uribel (makes urine blue) up to 4x/day.    --consider daily overactive bladder medication  --STRESS:  Pessary vs. Sling.     3)  Constipation:  --hydrate well  Controlling constipation may help bladder urgency/leakage and fiber may better control cholesterol and blood glucose.  Start daily fiber.  Take 1 tsp of fiber powder (non-soluble, methylcellulose; citrucel).  Mix in 8 oz of water.  Take x 3-5 days.  Then, increase  fiber by 1 tsp every 3-5 days until stool is easy to pass.  Stop and continue at that dose.   Do not exceed 6 tsps/day.  May also use over the counter stool softener 1-2 x/day.    --can add magnesium oxide 400 mg daily  --AVOID laxatives. Can try miralax if really bad.      4)  Vaginal atrophy (dryness):    --consider Vaginal estrogen:  --Use 0.5 grams of estrogen cream in vagina with applicator or dime-sized amount with finger (as far as can reach internally) nightly x 2 weeks, then twice a week thereafter.  You can also apply a dime-sized amount with your finger around the vaginal opening and inner lips at same frequency.     --Vaginal estrogen may help to decrease pain related to dryness with intercourse and urinary symptoms (urgency/frequency/UTIs) around menopause.   --will reach out to breast surgeon to see if ok: Mitzi Gerber MD ([facility])    B) In meantime, Non-estrogen options for vaginal dryness:  --Use REPLENS or REFRESH OTC: 1/2 applicator full in vagina twice a week.    --coconut oil: dime-sized amount with finger (as far as can reach internally) and around the vaginal opening and inner lips up to nightly as needed  --Uberlube, Astroglide, [place] liquibeads, Satin by Sliquid Natural Intimate Moisturizer    5)  RTC 3 months.     Approximately 60 min were spent in consult, 90 % in discussion.     Thank you for requesting consultation of your patient.  I look forward to participating in their care.    Kelly Ching  Female Pelvic Medicine and Reconstructive Surgery  [facility] [place], [place]

## 2024-01-18 ENCOUNTER — OFFICE VISIT (OUTPATIENT)
Dept: UROGYNECOLOGY | Facility: CLINIC | Age: 79
End: 2024-01-18
Payer: MEDICARE

## 2024-01-18 VITALS
WEIGHT: 226.88 LBS | DIASTOLIC BLOOD PRESSURE: 78 MMHG | SYSTOLIC BLOOD PRESSURE: 131 MMHG | BODY MASS INDEX: 34.49 KG/M2

## 2024-01-18 DIAGNOSIS — N32.89 BLADDER SPASM: ICD-10-CM

## 2024-01-18 DIAGNOSIS — R39.15 URINARY URGENCY: ICD-10-CM

## 2024-01-18 DIAGNOSIS — N39.3 FEMALE GENUINE STRESS INCONTINENCE: ICD-10-CM

## 2024-01-18 DIAGNOSIS — K59.09 CHRONIC CONSTIPATION: ICD-10-CM

## 2024-01-18 DIAGNOSIS — N95.2 VAGINAL ATROPHY: ICD-10-CM

## 2024-01-18 DIAGNOSIS — R30.0 DYSURIA: Primary | ICD-10-CM

## 2024-01-18 PROCEDURE — 51701 INSERT BLADDER CATHETER: CPT | Mod: PBBFAC | Performed by: OBSTETRICS & GYNECOLOGY

## 2024-01-18 PROCEDURE — 87088 URINE BACTERIA CULTURE: CPT | Performed by: OBSTETRICS & GYNECOLOGY

## 2024-01-18 PROCEDURE — 87186 SC STD MICRODIL/AGAR DIL: CPT | Performed by: OBSTETRICS & GYNECOLOGY

## 2024-01-18 PROCEDURE — 99214 OFFICE O/P EST MOD 30 MIN: CPT | Mod: PBBFAC | Performed by: OBSTETRICS & GYNECOLOGY

## 2024-01-18 PROCEDURE — 99999 PR PBB SHADOW E&M-EST. PATIENT-LVL IV: CPT | Mod: PBBFAC,,, | Performed by: OBSTETRICS & GYNECOLOGY

## 2024-01-18 PROCEDURE — 99205 OFFICE O/P NEW HI 60 MIN: CPT | Mod: S$PBB,25,, | Performed by: OBSTETRICS & GYNECOLOGY

## 2024-01-18 PROCEDURE — 51701 INSERT BLADDER CATHETER: CPT | Mod: S$PBB,,, | Performed by: OBSTETRICS & GYNECOLOGY

## 2024-01-18 PROCEDURE — 87086 URINE CULTURE/COLONY COUNT: CPT | Performed by: OBSTETRICS & GYNECOLOGY

## 2024-01-18 PROCEDURE — 87077 CULTURE AEROBIC IDENTIFY: CPT | Performed by: OBSTETRICS & GYNECOLOGY

## 2024-01-18 NOTE — PATIENT INSTRUCTIONS
Bladder Irritants  Certain foods and drinks have been associated with worsening symptoms of urinary frequency, urgency, urge incontinence, or bladder pain. If you suffer from any of these conditions, you may wish to try eliminating one or more of these foods from your diet and see if your symptoms improve. If bladder symptoms are related to dietary factors, strict adherence to a diet thateliminates the food should bring marked relief in 10 days. Once you are feeling better, you can begin to add foods back into your diet, one at a time. If symptoms return, you will be able to identify the irritant. As you add foods back to your diet it is very important that you drink significant amounts of water.    -----------------------------------------------------------------------------------------------  List of Common Bladder Irritants*  Alcoholic beverages  Apples and apple juice  Cantaloupe  Carbonated beverages  Chili and spicy foods  Chocolate  Citrus fruit  Coffee (including decaffeinated)  Cranberries and cranberry juice  Grapes  Guava  Milk Products: milk, cheese, cottage cheese, yogurt, ice cream  Peaches  Pineapple  Plums  Strawberries  Sugar especially artificial sweeteners, saccharin, aspartame, corn sweeteners, honey, fructose, sucrose, lactose  Tea  Tomatoes and tomato juice  Vitamin B complex  Vinegar  *Most people are not sensitive to ALL of these products; your goal is to find the foods that make YOUR symptoms worse.  ---------------------------------------------------------------------------------------------------    Low-acid fruit substitutions include apricots, papaya, pears and watermelon. Coffee drinkers can drink Kava or other lowacid instant drinks. Tea drinkers can substitute non-citrus herbal and sun brewed teas. Calcium carbonate co-buffered with calcium ascorbate can be substituted for Vitamin C. Prelief is a dietary supplement that works as an acid blocker for the bladder.    Where to get more  information:        Overcoming Bladder Disorders by Liliam Petersen and Cherri Pineda,         You Dont Have to Live with Cystitis! By Christina Falk,   http://[email or web address].org/oab;a  -------------------------------------------------------------------------------------------------------  1)  Stress incontinence:  --urine C&S    --POS cough stress  --Empty bladder every 3 hours.  Empty well: wait a minute, lean forward on toilet.    --Avoid dietary irritants (see sheet).  Keep diary x 3-5 days to determine your irritants.  --KEGELS: do 10 in AM and 10 in PM, holding each x 10 seconds.  When you feel urge to go, STOP, KEGEL, and when urge has passed, then go to bathroom.  Consider PT in future.    --STRESS:  Pessary vs. Sling.     2)  Urinary symptoms:  --urine C&S  --probably bladder spasms  --treat vaginal dryness  --Empty bladder every 2-3 hours.  Empty well: wait a minute, lean forward on toilet.    --Avoid dietary irritants (see sheet).  Keep diary x 3-5 days to determine your irritants.  --KEGELS: do 10 in AM and 10 in PM, holding each x 10 seconds.  When you feel urge to go, STOP, KEGEL, and when urge has passed, then go to bathroom.  Consider PT in future.  Call to make appt:    [facility]   Rose Lock DPT  [address] [address]   [phone]  Phone [phone]     --for acute discomfort episodes: try uribel (makes urine blue) up to 4x/day.    --consider daily overactive bladder medication  --STRESS:  Pessary vs. Sling.     3)  Constipation:  --hydrate well  Controlling constipation may help bladder urgency/leakage and fiber may better control cholesterol and blood glucose.  Start daily fiber.  Take 1 tsp of fiber powder (non-soluble, methylcellulose; citrucel).  Mix in 8 oz of water.  Take x 3-5 days.  Then, increase fiber by 1 tsp every 3-5 days until stool is easy to pass.  Stop and continue at that  dose.   Do not exceed 6 tsps/day.  May also use over the counter stool softener 1-2 x/day.    --can add magnesium oxide 400 mg daily  --AVOID laxatives. Can try miralax if really bad.      4)  Vaginal atrophy (dryness):    --consider Vaginal estrogen:  --Use 0.5 grams of estrogen cream in vagina with applicator or dime-sized amount with finger (as far as can reach internally) nightly x 2 weeks, then twice a week thereafter.  You can also apply a dime-sized amount with your finger around the vaginal opening and inner lips at same frequency.     --Vaginal estrogen may help to decrease pain related to dryness with intercourse and urinary symptoms (urgency/frequency/UTIs) around menopause.   --will reach out to breast surgeon to see if ok: Mitzi Gerber MD ([facility])    B) In meantime, Non-estrogen options for vaginal dryness:  --Use REPLENS or REFRESH OTC: 1/2 applicator full in vagina twice a week.    --coconut oil: dime-sized amount with finger (as far as can reach internally) and around the vaginal opening and inner lips up to nightly as needed  --Uberlube, [place]ro[place]ide, [place] liquibeads, Satin by Sliquid Natural Intimate Moisturizer    5)  RTC 3 months.

## 2024-01-21 ENCOUNTER — PATIENT MESSAGE (OUTPATIENT)
Dept: UROGYNECOLOGY | Facility: CLINIC | Age: 79
End: 2024-01-21
Payer: MEDICARE

## 2024-01-22 ENCOUNTER — PATIENT MESSAGE (OUTPATIENT)
Dept: UROGYNECOLOGY | Facility: CLINIC | Age: 79
End: 2024-01-22
Payer: MEDICARE

## 2024-01-22 DIAGNOSIS — N30.00 ACUTE CYSTITIS WITHOUT HEMATURIA: ICD-10-CM

## 2024-01-22 DIAGNOSIS — N95.2 VAGINAL ATROPHY: ICD-10-CM

## 2024-01-22 DIAGNOSIS — Z85.3 HISTORY OF BREAST CANCER: Primary | ICD-10-CM

## 2024-01-22 LAB — BACTERIA UR CULT: ABNORMAL

## 2024-01-22 RX ORDER — AMOXICILLIN AND CLAVULANATE POTASSIUM 875; 125 MG/1; MG/1
1 TABLET, FILM COATED ORAL 2 TIMES DAILY
Qty: 10 TABLET | Refills: 0 | Status: SHIPPED | OUTPATIENT
Start: 2024-01-22 | End: 2024-01-27

## 2024-01-23 ENCOUNTER — PATIENT MESSAGE (OUTPATIENT)
Dept: UROGYNECOLOGY | Facility: CLINIC | Age: 79
End: 2024-01-23
Payer: MEDICARE

## 2024-01-23 ENCOUNTER — TELEPHONE (OUTPATIENT)
Dept: UROGYNECOLOGY | Facility: CLINIC | Age: 79
End: 2024-01-23
Payer: MEDICARE

## 2024-01-23 NOTE — TELEPHONE ENCOUNTER
Spoke with patient/ She sees Dr. Clementine Meade at [facility] for med onc.  Let her know we needed to speak with her to get ok or not for vaginal estrogen.  Patient would like us to reach out to her, which I will do, but I also suggested patent make follow up appt (last visit was >1 year ago). Patient states was told no longer needs follow up. She has breast CA Dx from 2022.  I discussed fact that breast cancer is usually followed for  few years.  She will reach out to make appt.  I attempted to call MD, left . Will try again.       Clementine Cooper M.D.  [address] [address]   MAKE AN APPOINTMENT  Phone  [phone]  Fax  [phone]    ----- Message from Mitzi Gerber MD sent at 1/19/2024 12:14 PM CST -----  Regarding: RE: patient with breast cancer and vaginal atrophy  This is technically a question for her medical oncologist.  Typically alternative methods are used in breast cancer pts and vaginal estrogen is used as a last resort when all else has failed. But I do defer to med onc for their opinion.  Thanks,  Mitzi Gerber  ----- Message -----  From: Kelly Ching MD  Sent: 1/18/2024   7:56 PM CST  To: Kelly Cihng MD; Mitzi Gerber MD; #  Subject: patient with breast cancer and vaginal atrop#    Hi Dr. Gerebr: I saw Mrs. Junior today for pelvic floor issues. It seems like she's probably having significant, uncomfortable bladder spasms, which we are trying to get better.  She also has a lot of vaginal atrophy on exam, which can definitely contribute to genitourinary symptoms of menopause (GUSM).  We usually recc 0.5g vaginal estrogen 2x/week to help treat this and, hopefully, help some of her urinary symptoms. I know she has the h/o breast cancer, which she is saying was estrogen sensitive, not taking estrogen blocker.  Would you be ok with her using a small amount of estrogen cream 2x/week at night before bed?  We can try  to treat her symptoms with other means, but I suspect the atrophy is contributing to her symptoms, as well. If not ok with estrogen cream, would you be ok with starting vaginal intrarosa (prasterone, DHEA)?  There's not as much evidence that it helps GUSM, but we can try and see.   Of course, I will follow whatever reccs you feel comfortable with.  I appreciate your input regarding her care.   Best,   Kelly Ching MD  Urogynecology

## 2024-01-23 NOTE — TELEPHONE ENCOUNTER
"----- Message from Va Rubi MA sent at 1/23/2024 11:10 AM CST -----  Good morning,    I spoke with patient in regards to scheduling an appointment. Patient is under the assumption that Dr. Ching will reach out to her Oncologist for medication approval. Patient declined scheduling with AVTAR Wetzel because "she does not want to pay another co-pay to see another doctor"  ----- Message -----  From: Kelly Ching MD  Sent: 1/22/2024   7:50 PM CST  To: Najma Alvarez MA; Vicki Reddy MA; #    Vicki, Please let patient know:  1) she has a UTI. Augmentin sent to her pharmacy.    2) UTIs may be contributing to her urinary leakage and symptoms. So, we want to try to prevent these.  Treating vaginal dryness is one way to help that. I did reach out to her breast surgeon, but she said that I would need to talk with patient's medial oncologist (which she doesn't have).  So, I think it would be good for her to see someone on women's wellness to see if she can start vaginal estrogen or some other regimen in attempt to reduce UTIs. Consult placed.     --Vicki, can you please work with Najma (cc'rober) to schedule patient in Women's Wellness with next avail physician or midlevel?    Thanks!    "

## 2024-01-23 NOTE — TELEPHONE ENCOUNTER
Spoke to patient.  Let her know that she has a urinary tract infection and medication was sent to her pharmacy.  Patient stated she has an oncologist on the [facility] and will ask her to contact Dr. Ching.  Patient verbalized understanding.  Call ended.

## 2024-01-24 ENCOUNTER — TELEPHONE (OUTPATIENT)
Dept: OBSTETRICS AND GYNECOLOGY | Facility: CLINIC | Age: 79
End: 2024-01-24
Payer: MEDICARE

## 2024-01-24 NOTE — NURSING
MAGDIEL Potter reached out to patient to explain referral to [facility] Dr. Quigley, she will call back to schedule and prefers a virtual appt. She desires to clear the referral details with Dr. Salazar and get back with me. MAGDIEL Potter will message referring provider in hopes of clarifying referral,  MAGDIEL Lyons.

## 2024-01-25 ENCOUNTER — TELEPHONE (OUTPATIENT)
Dept: UROGYNECOLOGY | Facility: CLINIC | Age: 79
End: 2024-01-25
Payer: MEDICARE

## 2024-01-25 ENCOUNTER — PATIENT MESSAGE (OUTPATIENT)
Dept: UROGYNECOLOGY | Facility: CLINIC | Age: 79
End: 2024-01-25
Payer: MEDICARE

## 2024-01-25 DIAGNOSIS — N95.2 VAGINAL ATROPHY: Primary | ICD-10-CM

## 2024-01-25 RX ORDER — ESTRADIOL 0.1 MG/G
CREAM VAGINAL
Qty: 42.5 G | Refills: 11 | Status: ON HOLD | OUTPATIENT
Start: 2024-01-25 | End: 2024-04-23 | Stop reason: HOSPADM

## 2024-01-25 NOTE — TELEPHONE ENCOUNTER
"Please let patient know that we received the "ok" letter from her oncologist, and I sent in the estrace cream to her [facility] in [place].  Again, using it can help some of her bladder symptoms and reduce chances of UTIs.  Instructions for use: 0.5 grams with applicator in vagina and dime-sized amount with finger around opening/inner lips nightly x 2 weeks, then twice a week thereafter.    Thanks!  "

## 2024-02-11 ENCOUNTER — PATIENT MESSAGE (OUTPATIENT)
Dept: FAMILY MEDICINE | Facility: CLINIC | Age: 79
End: 2024-02-11
Payer: MEDICARE

## 2024-02-11 DIAGNOSIS — R05.3 CHRONIC COUGH: Primary | ICD-10-CM

## 2024-02-14 ENCOUNTER — PATIENT MESSAGE (OUTPATIENT)
Dept: FAMILY MEDICINE | Facility: CLINIC | Age: 79
End: 2024-02-14
Payer: MEDICARE

## 2024-02-17 ENCOUNTER — PATIENT MESSAGE (OUTPATIENT)
Dept: FAMILY MEDICINE | Facility: CLINIC | Age: 79
End: 2024-02-17
Payer: MEDICARE

## 2024-02-17 DIAGNOSIS — M79.7 FIBROMYALGIA: ICD-10-CM

## 2024-02-17 DIAGNOSIS — R05.3 CHRONIC COUGH: Primary | ICD-10-CM

## 2024-02-17 DIAGNOSIS — F41.9 ANXIETY: ICD-10-CM

## 2024-02-19 ENCOUNTER — PATIENT MESSAGE (OUTPATIENT)
Dept: FAMILY MEDICINE | Facility: CLINIC | Age: 79
End: 2024-02-19
Payer: MEDICARE

## 2024-02-20 ENCOUNTER — TELEPHONE (OUTPATIENT)
Dept: OBSTETRICS AND GYNECOLOGY | Facility: CLINIC | Age: 79
End: 2024-02-20
Payer: MEDICARE

## 2024-02-20 RX ORDER — PROMETHAZINE HYDROCHLORIDE AND DEXTROMETHORPHAN HYDROBROMIDE 6.25; 15 MG/5ML; MG/5ML
5 SYRUP ORAL EVERY 4 HOURS PRN
Qty: 240 ML | Refills: 0 | Status: SHIPPED | OUTPATIENT
Start: 2024-02-20 | End: 2024-03-01

## 2024-02-20 RX ORDER — VENLAFAXINE 75 MG/1
75 TABLET ORAL 2 TIMES DAILY
Qty: 180 TABLET | Refills: 3 | Status: SHIPPED | OUTPATIENT
Start: 2024-02-20 | End: 2025-02-19

## 2024-02-20 NOTE — TELEPHONE ENCOUNTER
Care Due:                  Date            Visit Type   Department     Provider  --------------------------------------------------------------------------------                                MYCHART                              FOLLOWUP/OF  [facility] FAMILY  Last Visit: 12-      FICE VISIT   MEDICINE       Connor Viramontes                              EP -                              PRIMARY      [facility]  Next Visit: 06-      CARE ([facility])   MEDICINE       Connor Viramontes                                                            Last  Test          Frequency    Reason                     Performed    Due Date  --------------------------------------------------------------------------------    CMP.........  12 months..  ezetimibe................  04- 04-    [facility] Embedded Care Due Messages. Reference number: [id number].   2/20/2024 10:18:54 AM CST

## 2024-02-20 NOTE — NURSING
Phoned patient to follow-up on referral for [facility]. She has been using estrace cream approved by her oncologist and participating in Pelvic Floor PT. No additional support needed at this time. Declined referral. Contact information provided in the event she would like to see our team, patient appreciative, MAGDIEL Lyons.

## 2024-02-21 ENCOUNTER — PATIENT MESSAGE (OUTPATIENT)
Dept: FAMILY MEDICINE | Facility: CLINIC | Age: 79
End: 2024-02-21
Payer: MEDICARE

## 2024-03-25 ENCOUNTER — PATIENT MESSAGE (OUTPATIENT)
Dept: FAMILY MEDICINE | Facility: CLINIC | Age: 79
End: 2024-03-25
Payer: MEDICARE

## 2024-03-31 DIAGNOSIS — M79.7 FIBROMYALGIA: ICD-10-CM

## 2024-03-31 RX ORDER — PREGABALIN 100 MG/1
100 CAPSULE ORAL 2 TIMES DAILY
Qty: 180 CAPSULE | OUTPATIENT
Start: 2024-03-31

## 2024-03-31 NOTE — TELEPHONE ENCOUNTER
No care due was identified.  [facility] Embedded Care Due Messages. Reference number: [id number].   3/31/2024 11:19:44 AM CDT

## 2024-04-03 ENCOUNTER — PATIENT MESSAGE (OUTPATIENT)
Dept: FAMILY MEDICINE | Facility: CLINIC | Age: 79
End: 2024-04-03
Payer: MEDICARE

## 2024-04-03 DIAGNOSIS — M79.7 FIBROMYALGIA: ICD-10-CM

## 2024-04-04 ENCOUNTER — PATIENT MESSAGE (OUTPATIENT)
Dept: FAMILY MEDICINE | Facility: CLINIC | Age: 79
End: 2024-04-04
Payer: MEDICARE

## 2024-04-04 RX ORDER — PREGABALIN 100 MG/1
100 CAPSULE ORAL 2 TIMES DAILY
Qty: 180 CAPSULE | Refills: 1 | Status: SHIPPED | OUTPATIENT
Start: 2024-04-04 | End: 2024-10-01

## 2024-04-22 PROBLEM — E86.0 DEHYDRATION: Status: ACTIVE | Noted: 2024-04-22

## 2024-04-22 PROBLEM — S42.302A: Status: ACTIVE | Noted: 2024-04-22
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
## 2024-04-22 PROBLEM — S42.309A HUMERUS FRACTURE: Status: ACTIVE | Noted: 2024-04-22

## 2024-04-22 PROBLEM — M79.622 LEFT UPPER ARM PAIN: Status: ACTIVE | Noted: 2024-04-22

## 2024-04-22 PROBLEM — Z91.89 AT RISK FOR ALLERGIC REACTION TO MEDICATION: Status: ACTIVE | Noted: 2024-04-22

## 2024-04-22 PROBLEM — R29.6 FALLS: Status: ACTIVE | Noted: 2024-04-22

## 2024-04-23 PROBLEM — T50.905A DRUG REACTION: Status: ACTIVE | Noted: 2024-04-23

## 2024-04-28 ENCOUNTER — PATIENT MESSAGE (OUTPATIENT)
Dept: FAMILY MEDICINE | Facility: CLINIC | Age: 79
End: 2024-04-28
Payer: MEDICARE

## 2024-04-29 ENCOUNTER — PATIENT MESSAGE (OUTPATIENT)
Dept: FAMILY MEDICINE | Facility: CLINIC | Age: 79
End: 2024-04-29
Payer: MEDICARE

## 2024-04-29 ENCOUNTER — E-VISIT (OUTPATIENT)
Dept: INTERNAL MEDICINE | Facility: CLINIC | Age: 79
End: 2024-04-29
Payer: MEDICARE

## 2024-04-29 ENCOUNTER — TELEPHONE (OUTPATIENT)
Dept: FAMILY MEDICINE | Facility: CLINIC | Age: 79
End: 2024-04-29
Payer: MEDICARE

## 2024-04-29 DIAGNOSIS — N30.00 ACUTE CYSTITIS WITHOUT HEMATURIA: Primary | ICD-10-CM

## 2024-04-29 DIAGNOSIS — R30.0 DYSURIA: Primary | ICD-10-CM

## 2024-04-29 RX ORDER — CEFDINIR 300 MG/1
300 CAPSULE ORAL 2 TIMES DAILY
Qty: 14 CAPSULE | Refills: 0 | Status: SHIPPED | OUTPATIENT
Start: 2024-04-29 | End: 2024-05-16

## 2024-04-29 NOTE — TELEPHONE ENCOUNTER
----- Message from Kei Louis sent at 4/29/2024  8:24 AM CDT -----  Regarding: advice  Type:  Needs Medical Advice    Who Called: yossi    Best Call Back Number: [phone]      Additional Information: pt st that she has an uti in wants to know if  can send a script over to pharm. She see  Wednesday & says she can't wait till then.  please call to discuss.       [id number] - [place] [address] [address] [place] [id number]  Phone: [phone] Fax: [phone]

## 2024-04-29 NOTE — TELEPHONE ENCOUNTER
Spoke to pt on phone, explained that since she is being seen for a hospital follow up she would need to come in, offered to reschedule to a later date, pt declined and stated she would come into the clinic at scheduled date and time.

## 2024-04-29 NOTE — TELEPHONE ENCOUNTER
----- Message from Italia Wilson sent at 4/29/2024 12:27 PM CDT -----  Contact: Tavon oliva/[facility]  Pt states that she has a urine test for today but Tavon does not see orders for the test.  Please call back to advise at [phone]

## 2024-04-29 NOTE — TELEPHONE ENCOUNTER
Called and informed Home health nurse Montes De Oca of orders being placed. Nurse Montes De Oca verbalized understanding.

## 2024-04-29 NOTE — TELEPHONE ENCOUNTER
----- Message from Lilliana Damico LPN sent at 4/29/2024 12:04 PM CDT -----  Regarding: FW: Lab orders    ----- Message -----  From: Tavon Hernandez RN  Sent: 4/29/2024   9:53 AM CDT  To: [facility] Staff  Subject: Lab orders                                       I see this patient has been in contact this  am regarding uti symptoms. I am seeing her at home at lunchtime. Has the MD approved labs? Ua or ua and culture?  Thank you

## 2024-04-29 NOTE — TELEPHONE ENCOUNTER
I received a message about dysuria and urinary frequency.  I ordered a urinalysis and urine culture through home health.  Please have them get it today.  I also ordered cefdinir 300 mg twice daily for 7 days based on previous urine culture of E coli that was sensitive to ceftriaxone.

## 2024-04-29 NOTE — TELEPHONE ENCOUNTER
----- Message from Kei Louis sent at 4/29/2024  8:24 AM CDT -----  Regarding: advice  Type:  Needs Medical Advice    Who Called: yossi    Best Call Back Number: [phone]      Additional Information: pt st that she has an uti in wants to know if  can send a script over to pharm. She see  Wednesday & says she can't wait till then.  please call to discuss.       [id number] - [place] [address] & [address] [place] [id number]  Phone: [phone] Fax: [phone]

## 2024-05-01 ENCOUNTER — OFFICE VISIT (OUTPATIENT)
Dept: FAMILY MEDICINE | Facility: CLINIC | Age: 79
End: 2024-05-01
Payer: MEDICARE

## 2024-05-01 VITALS
DIASTOLIC BLOOD PRESSURE: 82 MMHG | SYSTOLIC BLOOD PRESSURE: 132 MMHG | WEIGHT: 237 LBS | HEART RATE: 97 BPM | BODY MASS INDEX: 35.92 KG/M2 | OXYGEN SATURATION: 96 % | HEIGHT: 68 IN

## 2024-05-01 DIAGNOSIS — E78.2 MIXED HYPERLIPIDEMIA: ICD-10-CM

## 2024-05-01 DIAGNOSIS — E21.3 HYPERPARATHYROIDISM: ICD-10-CM

## 2024-05-01 DIAGNOSIS — I10 ESSENTIAL HYPERTENSION: ICD-10-CM

## 2024-05-01 DIAGNOSIS — N90.5 VULVAR ATROPHY: ICD-10-CM

## 2024-05-01 DIAGNOSIS — R30.0 DYSURIA: ICD-10-CM

## 2024-05-01 DIAGNOSIS — S42.292S HUMERAL HEAD FRACTURE, LEFT, SEQUELA: Primary | ICD-10-CM

## 2024-05-01 DIAGNOSIS — R05.3 CHRONIC COUGH: ICD-10-CM

## 2024-05-01 DIAGNOSIS — I48.0 PAROXYSMAL ATRIAL FIBRILLATION: ICD-10-CM

## 2024-05-01 DIAGNOSIS — D68.4: ICD-10-CM

## 2024-05-01 DIAGNOSIS — E66.01 CLASS 2 SEVERE OBESITY DUE TO EXCESS CALORIES WITH SERIOUS COMORBIDITY AND BODY MASS INDEX (BMI) OF 36.0 TO 36.9 IN ADULT: ICD-10-CM

## 2024-05-01 PROBLEM — I70.0 AORTIC ATHEROSCLEROSIS: Status: RESOLVED | Noted: 2023-04-24 | Resolved: 2024-05-01

## 2024-05-01 PROBLEM — N32.89 BLADDER SPASM: Status: RESOLVED | Noted: 2024-01-18 | Resolved: 2024-05-01

## 2024-05-01 PROBLEM — E86.0 DEHYDRATION: Status: RESOLVED | Noted: 2024-04-22 | Resolved: 2024-05-01

## 2024-05-01 PROCEDURE — 99213 OFFICE O/P EST LOW 20 MIN: CPT | Mod: PBBFAC,PO | Performed by: INTERNAL MEDICINE

## 2024-05-01 PROCEDURE — G2211 COMPLEX E/M VISIT ADD ON: HCPCS | Mod: S$PBB,,, | Performed by: INTERNAL MEDICINE

## 2024-05-01 PROCEDURE — 99214 OFFICE O/P EST MOD 30 MIN: CPT | Mod: S$PBB,,, | Performed by: INTERNAL MEDICINE

## 2024-05-01 PROCEDURE — 99999 PR PBB SHADOW E&M-EST. PATIENT-LVL III: CPT | Mod: PBBFAC,,, | Performed by: INTERNAL MEDICINE

## 2024-05-01 RX ORDER — ESTRADIOL 0.1 MG/G
0.5 CREAM VAGINAL
Qty: 42.5 G | Refills: 12 | Status: SHIPPED | OUTPATIENT
Start: 2024-05-02 | End: 2025-05-02

## 2024-05-01 NOTE — PROGRESS NOTES
[facility]  Primary Care   [address]       Patient ID: Lauren Junior     Chief Complaint:   Chief Complaint   Patient presents with    Follow-up     Hospital           HPI:  Follow-up after she was admitted for short-stay to [facility].  A few days prior she had a mechanical fall in her yd and unfortunately fractured her left humeral head.  The pain was intense as expected so she went to the ER where the diagnosis was made and she was discharged with a sling and some pain pills.  She saw her orthopedic doctor the next day who agreed with conservative management with the mobilization and gave her some more pain pills.  Those were Percocet.  Once home though the pain increased and was not completely controlled with the Percocet.  She started having side effects of nausea and increased anxiety and even some hallucinations but that is more likely because she was not eating very well and getting dehydrated.  Her  correctly took her back to the ER where dehydration was diagnose and treat with IV fluids and she was actually kept for short stay while they tweaked her pain medication to get her on a better regime.  Toradol and tramadol did not work, but Norco did.  She was also given some ibuprofen and some muscle relaxers.  Since then she has been able to stop all 3 of those medications because the pain is much better controlled.  I will allow her to take ibuprofen on an as-needed basis and she does have some leftover hydrocodone just in case things get rough.  Currently she is in a arm immobilizer but that is only because she is going out.  She is able to tolerate non mobilization At Home as long as she has not too active and I am happy about that.  She will see her private orthopedic doctor next week and probably get an x-ray to see how she is healing.  She does request a refill on her estrogen cream that was discontinued for some reason at the hospital.  She did message me  "about some dysuria and urinary tract infection concerns.  Her urinalysis did not look too bad but I was worried more about symptomology so we put her on cefdinir and she is feeling much better.  She does have a chronic cough that odd the has come and gone over the past few weeks.  Currently now she has expectorating a small amount of clear phlegm I think it could be due to some sinus issues but the cefdinir she is taking should handle that.  For now I just want her to do what she can normally and not over exert herself to allow her arm to heal but that is little pain in his possible.    Review of Systems       Left Upper Extremity pain     Objective:      Physical Exam   Physical Exam       Left Upper Extremity sling     Vitals:   Vitals:    05/01/24 1100   BP: 132/82   Pulse: 97   SpO2: 96%   Weight: 107.5 kg (236 lb 15.9 oz)   Height: 5' 8" (1.727 m)        Assessment:           Plan:       Lauren Junior  was seen today for follow-up and may need lab work.    Diagnoses and all orders for this visit:    Lauren was seen today for follow-up.    Diagnoses and all orders for this visit:    Humeral head fracture, left, sequela  Healing. Pain Controlled   Will Follow up with Dr. Sun soon   Continue brace and immobilizer as needed     Chronic cough  Monitor after antibiotics     Dysuria  Improved with Cefdinir    Vulvar atrophy  -     estradioL (ESTRACE) 0.01 % (0.1 mg/gram) vaginal cream; Place 0.5 g vaginally twice a week. Apply daily for 7 days and then twice weekly afterwards.  Controlled with med     Essential hypertension  Controlled with med     Class 2 severe obesity due to excess calories with serious comorbidity and body mass index (BMI) of 36.0 to 36.9 in adult  Monitor     Mixed hyperlipidemia  Will Monitor labs     Paroxysmal atrial fibrillation  Controlled with med     Deficiency of clotting factor  Monitor     Hyperparathyroidism  S/p parathyroidectomy- Monitor Calcium      Visit today included increased " complexity associated with the care of the episodic problem Left humeral fracture addressed and managing the longitudinal care of the patient due to the serious and/or complex managed problem(s) .      Connor Viramontes MD

## 2024-05-07 ENCOUNTER — PATIENT MESSAGE (OUTPATIENT)
Dept: FAMILY MEDICINE | Facility: CLINIC | Age: 79
End: 2024-05-07
Payer: MEDICARE

## 2024-05-07 PROCEDURE — 99499 UNLISTED E&M SERVICE: CPT | Mod: ,,, | Performed by: INTERNAL MEDICINE

## 2024-05-07 RX ORDER — AMOXICILLIN AND CLAVULANATE POTASSIUM 875; 125 MG/1; MG/1
1 TABLET, FILM COATED ORAL 2 TIMES DAILY
Qty: 14 TABLET | Refills: 0 | Status: SHIPPED | OUTPATIENT
Start: 2024-05-07 | End: 2024-05-14

## 2024-05-07 NOTE — PROGRESS NOTES
Patient ID: Lauren Junior is a 78 y.o. female.    Chief Complaint: Urinary Tract Infection (Entered automatically based on patient selection in Patient Portal.)    The patient initiated a request through [facility] on 4/29/2024 for evaluation and management with a chief complaint of Urinary Tract Infection (Entered automatically based on patient selection in Patient Portal.)     I evaluated the questionnaire submission on 5/7/24.    [facility] Evisit Uti Questionnaire    5/7/2024  3:55 PM CDT - Filed by Patient   Do you agree to participate in an E-Visit? Yes   If you have any of the following problems, please present to your local ER or call 911:  I acknowledge   Choose the state of your primary residence [place]   What is the main issue you would like addressed today? Possible UTI   Are you able to take your vital signs? No   What symptoms do you currently have? Pain while passing urine   When did your symptoms first appear? 4/29/2024   List what you have done or taken to help your symptoms. Increased fluids   Please indicate whether you have had the following symptoms during the past 24 hours     Urgent urination (a sudden and uncontrollable urge to urinate) Moderate   Frequent urination of small amounts of urine (going to the toilet very often) Moderate   Burning pain when urinating Moderate   Incomplete bladder emptying (still feel like you need to urinate again after urination) Mild   Pain not associated with urination in the lower abdomen below the belly button) None   What does your urine look like? Clear   Blood seen in the urine None   Flank pain (pain in one or both sides of the lower back) None   Abnormal Vaginal Discharge (abnormal amount, color and/or odor) None   Discharge from the urethra (urinary opening) without urination None   High body temperature/fever? None-<99.5   Please rate how much discomfort you have experience because of the symptoms in the past 24 hours: Moderate   Please indicate how the  symptoms have interfered with your every day activities/work in the past 24 hours: Moderate   Please indicate how these symptoms have interfered with your social activities (visiting people, meeting with friends, etc.) in the past 24 hours? Mild   Are you a diabetic? No   Please indicate whether you have the following at the time of completion of this questionnaire: None of the above   Provide any additional information you feel is important. Same symptoms in the resent past treated by Dr Ching   Please attach any relevant images or files (if you have performed a home test for UTI, please submit a photo of results)          Encounter Diagnosis   Name Primary?    Acute cystitis without hematuria Yes        No orders of the defined types were placed in this encounter.     Medications Ordered This Encounter   Medications    amoxicillin-clavulanate 875-125mg (AUGMENTIN) 875-125 mg per tablet     Sig: Take 1 tablet by mouth 2 (two) times daily. for 7 days     Dispense:  14 tablet     Refill:  0        No follow-ups on file.      E-Visit Time Tracking:    Day 1 Time (in minutes): 5    Total Time (in minutes): 5

## 2024-05-22 ENCOUNTER — OFFICE VISIT (OUTPATIENT)
Dept: UROGYNECOLOGY | Facility: CLINIC | Age: 79
End: 2024-05-22
Payer: MEDICARE

## 2024-05-22 ENCOUNTER — PATIENT MESSAGE (OUTPATIENT)
Dept: UROGYNECOLOGY | Facility: CLINIC | Age: 79
End: 2024-05-22
Payer: MEDICARE

## 2024-05-22 DIAGNOSIS — N95.2 VAGINAL ATROPHY: Primary | ICD-10-CM

## 2024-05-22 DIAGNOSIS — K59.09 CHRONIC CONSTIPATION: ICD-10-CM

## 2024-05-22 DIAGNOSIS — N39.3 FEMALE GENUINE STRESS INCONTINENCE: ICD-10-CM

## 2024-05-22 PROCEDURE — 99213 OFFICE O/P EST LOW 20 MIN: CPT | Mod: 95,,, | Performed by: OBSTETRICS & GYNECOLOGY

## 2024-05-22 NOTE — PROGRESS NOTES
Urogyn follow up  05/22/2024    [facility]  [address]  [facility] [address]    Lauren Junior  [id number]  1945    The patient location is: home  The chief complaint leading to consultation is: follow up    Visit type: audiovisual    Face to Face time with patient: 20 min  20 minutes of total time spent on the encounter, which includes face to face time and non-face to face time preparing to see the patient (eg, review of tests), Obtaining and/or reviewing separately obtained history, Documenting clinical information in the electronic or other health record, Independently interpreting results (not separately reported) and communicating results to the patient/family/caregiver, or Care coordination (not separately reported).     Each patient to whom he or she provides medical services by telemedicine is:  (1) informed of the relationship between the physician and patient and the respective role of any other health care provider with respect to management of the patient; and (2) notified that he or she may decline to receive medical services by telemedicine and may withdraw from such care at any time.    Notes: ,    Lauren Junior is a 78 y.o. here for a urogyn follow up.  The patient's last visit with me was on 1/18/2024.    1)  UI:  (+) KRISTEN (gym, cough, sneeze). (--) UUI.  Improved with 40 lb weight loss but has restarted with 20 lb weight regain.  (+) pads--only when out of home.  Daytime frequency: Q 3 hours.  Nocturia: Yes: 1/night, around 6 AM--can have UUI on way to [place].   (--) dysuria,  (--) hematuria,  (--) frequent UTIs.  (+) complete bladder emptying.  --starting  12/2023:  felt tingling sensation before and during urination--also some tingling in hands; + increased U/F with significant discomfort--feels like painful orgasm--lasted about 20 min; has had some other similar episodes since then  --did not have UTI check  --has not tried meds    2)  POP:  Absent.   (--) vaginal bleeding.  (--) vaginal discharge. (--) sexually active-- with ED.  (--) h/o dyspareunia.   (--)  Vaginal dryness.  (--) vaginal estrogen use.     3)  BM:  (+) constipation/straining. Takes miralax QOD.  Tried fiber but didn't like/caused gas. +straining.  (--) chronic diarrhea. (--) hematochezia.  (--) fecal incontinence.  (--) fecal smearing/urgency.  (+) complete evacuation.     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 No chemo or radiation. +E2 sens.   --h/o blood transfusion: ABL from thoracic outlet syndrome surgery  --afib: no blood thinners due to factor 11 clotting deficiency and was bleeding too much; no major EBL from other surgeries  --hyperparathyroidism: s/p parathyroidectomy for her hyperparathyroidism.  PTH levels dropped to normal shortly after surgery and her calcium decreased a few days later.   Lab Results   Component Value Date    TSH 3.460 12/15/2023     Lab Results   Component Value Date    CALCIUM 8.7 04/22/2024    PHOS 3.1 09/15/2021   --fibromyalgia: lyrica + effexor  --migraines: lyrica + effexor    Past Surgical History  Past Surgical History:   Procedure Laterality Date    ADENOIDECTOMY      big  toe pinned      wilmer rib resection      CYSTOSCOPY W/ STONE MANIPULATION      DEBRIDEMENT OF TENDON Left 2018    Procedure: DEBRIDEMENT, TENDON ACHILLES;  Surgeon: Goyo Hunter MD;  Location: [facility] OR;  Service: Orthopedics;  Laterality: Left;    DILATION AND CURETTAGE OF UTERUS      ENDOSCOPIC GASTROCNEMIUS RECESSION Left 2018    Procedure: RECESSION, GASTROCNEMIUS;  Surgeon: Goyo Hunter MD;  Location: [facility] OR;  Service: Orthopedics;  Laterality: Left;    HYSTERECTOMY      and right oophrectomy    INJECTION OF JOINT Left 2018    Procedure: INJECTION, JOINT - Injection Platelet Rich Plasma;  Surgeon: Goyo Hunter MD;  Location: [facility] OR;  Service: Orthopedics;  Laterality: Left;    KNEE ARTHROSCOPY W/ MENISCECTOMY Right     LUMPECTOMY, BREAST Right     OPEN REDUCTION AND INTERNAL FIXATION (ORIF) OF INJURY OF WRIST Left     PARATHYROIDECTOMY Bilateral 2023    Procedure: PARATHYROIDECTOMY;  Surgeon: Pedro Pablo Morton MD;  Location: [facility] OR;  Service: ENT;  Laterality: Bilateral;    SIMPLE MASTECTOMY Bilateral 2021    Procedure: MASTECTOMY, SIMPLE;  Surgeon: Mitzi Gerber MD;  Location: [facility];  Service: General;  Laterality: Bilateral;    TONSILLECTOMY      WRIST HARDWARE REMOVAL Left    B thoracic outlet surgery     Hysterectomy: Yes - menorrhagia  Date: 36 yo.  Indication: menorrhagia  Type: xlap/Pfannenstiel   Cervix present: No  Ovaries present: Yes - left (s/p RSO)  Other procedures at time of hysterectomy:  ?appy    Past Ob History     x 3.  C/s x 0.    Largest infant weight: 7#8oz.  no FAVD. yes episiotomy.      Gynecologic History  LMP: No LMP recorded. Patient has had a hysterectomy.  Age of menarche: 19 yo  Age of menopause: with YUVAL  Menstrual history: h/o menorrhagia  Pap test: post hyst.  History of abnormal paps: No.  History of STIs:  Yes - hep C (blood transfusion)--treated; +HPV--perianal dysplasia  Mammogram: s/p B mastectomy for  breast CA; has annual US + breast exam   Colonoscopy: Date of last: 2020.  Result:  polyp.  Repeat due:  2025.    DEXA:  Date of last: 2022.  Result:  normal.  Repeat due:  per PCP.     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    Hypercalcemia    Ductal carcinoma in situ (DCIS) of right breast    Hyperparathyroidism    Class 2 severe obesity due to excess calories with serious comorbidity and body mass index (BMI) of 35.0 to 35.9 in adult    History of breast cancer    Anxiety    Female genuine stress incontinence    Chronic constipation    Vaginal atrophy    Nondisplaced fracture of left humerus    At risk for allergic reaction to medication    Left upper arm pain    Falls    Drug reaction    Paroxysmal atrial fibrillation    Vulvar atrophy       History since last visit:    1)  Stress incontinence:  --was going to PFPT -- was going well but then had to stop due to femur Fx + allergy to oxycodone   --has been working on decreasing PF tension, then working on strengthening; working on meditation + relaxation  --baseline:(+) KRISTEN (gym, cough, sneeze). (--) UUI.  Improved with 40 lb weight loss but has restarted with 20 lb weight regain.  (+) pads--only when out of home.  Daytime frequency: Q 3 hours.  Nocturia: Yes: 1/night, around 6 AM--can have UUI on way to [place]  --currently: wore diaper around UTI sx in 4/2024; otherwise, not wearing pad unless out    2)  Urinary symptoms (fullness around vulva, urinary U/F):  --improved with estrogen  cream + PT  --called PCP for UTI symptoms; ordered C&S--didn't complete because UA was neg; took ABX and symptoms went away  --no symptoms currently or often  --didn't take uribel because takes amitriptyline    3)  Constipation:  --still having some constipation with pain meds  --taking large scoop benefiber QAM + 2 psyllium caps/daily + hydration--controlled    4)  Vaginal atrophy (dryness):    --using vaginal cream 2x/week    Medications:    Current Outpatient Medications:     amitriptyline (ELAVIL) 100 MG tablet, Take 1 tablet (100 mg total) by mouth every evening., Disp: 90 tablet, Rfl: 3    amLODIPine (NORVASC) 5 MG tablet, Take 1 tablet (5 mg total) by mouth once daily., Disp: 90 tablet, Rfl: 3    estradioL (ESTRACE) 0.01 % (0.1 mg/gram) vaginal cream, Place 0.5 g vaginally twice a week. Apply daily for 7 days and then twice weekly afterwards., Disp: 42.5 g, Rfl: 12    ezetimibe (ZETIA) 10 mg tablet, Take 1 tablet (10 mg total) by mouth once daily., Disp: 90 tablet, Rfl: 3    HYDROcodone-acetaminophen (NORCO) 5-325 mg per tablet, Take 1 tablet by mouth every 6 (six) hours as needed. Indications: pain, Disp: , Rfl:     ibuprofen 200 mg Cap, Take 2 tablets by mouth every 6 (six) hours as needed. Indications: pain, Disp: , Rfl:     levothyroxine (SYNTHROID) 88 MCG tablet, Take 1 tablet (88 mcg total) by mouth before breakfast., Disp: 90 tablet, Rfl: 3    metoprolol succinate (TOPROL-XL) 25 MG 24 hr tablet, Take 1 tablet (25 mg total) by mouth once daily., Disp: 90 tablet, Rfl: 3    multivitamin Tab, Take 1 tablet by mouth once daily., Disp: , Rfl:     omeprazole (PRILOSEC) 40 MG capsule, Take 1 capsule (40 mg total) by mouth once daily. (Patient taking differently: Take 40 mg by mouth every evening.), Disp: 90 capsule, Rfl: 3    pregabalin (LYRICA) 100 MG capsule, Take 1 capsule (100 mg total) by mouth 2 (two) times daily., Disp: 180 capsule, Rfl: 1    venlafaxine (EFFEXOR) 75 MG tablet, Take 1 tablet (75 mg  total) by mouth 2 (two) times daily., Disp: 180 tablet, Rfl: 3    vitamin D 1000 units Tab, Take 1,000 Units by mouth every evening., Disp: , Rfl:     ROS:  As per HPI.      Exam  There were no vitals taken for this visit.  General: alert and oriented, no acute distress  Remainder of PE deferred due to VV.     Impression  1. Vaginal atrophy        2. Chronic constipation        3. Female genuine stress incontinence          We reviewed the above issues and discussed options for short-term versus long-term management of their problems.   Plan:   1)  Stress incontinence:  --POS cough stress  --Empty bladder every 3 hours.  Empty well: wait a minute, lean forward on toilet.    --Avoid dietary irritants (see sheet).  Keep diary x 3-5 days to determine your irritants.  --KEGELS: do 10 in AM and 10 in PM, holding each x 10 seconds.  When you feel urge to go, STOP, KEGEL, and when urge has passed, then go to bathroom.  Consider PT in future.    --STRESS:  Pessary vs. Sling.     2)  Urinary symptoms:  --probably bladder spasms  --treat vaginal dryness  --Empty bladder every 2-3 hours.  Empty well: wait a minute, lean forward on toilet.    --Avoid dietary irritants (see sheet).  Keep diary x 3-5 days to determine your irritants.  --KEGELS: do 10 in AM and 10 in PM, holding each x 10 seconds.  When you feel urge to go, STOP, KEGEL, and when urge has passed, then go to bathroom.    --finish PT in future. Let me know when ready to restart so can send order. Message through portal:    [facility] [[email or web address]] [facility]   Rose Lock DPT  [address]   [place] [address]   [phone]  Phone [phone]     --for acute discomfort episodes: try uribel (makes urine blue) up to 4x/day.    --consider daily overactive bladder medication  --STRESS:  Pessary vs. Sling.     3)  Constipation:  --hydrate well  --continue daily benefiber scoop + psyllium capsules  --AVOID laxatives. Can try miralax if really bad.       4)  Vaginal atrophy (dryness):    --continue Vaginal estrogen:  --Use 0.5 grams of estrogen cream in vagina with applicator or dime-sized amount with finger (as far as can reach internally) at night then twice a week thereafter.  You can also apply a dime-sized amount with your finger around the vaginal opening and inner lips at same frequency.     --Vaginal estrogen may help to decrease pain related to dryness with intercourse and urinary symptoms (urgency/frequency/UTIs) around menopause.   --breast surgeon to see if ok: Mitzi Gerber MD ([facility])--messaged and was ok with this    5)  RTC 6 months. VV ok.     20 minutes were spent in face to face time with this patient  100 % of this time was spent in counseling and/or coordination of care     Kelly Ching MD  [facility]  Division of Female Pelvic Medicine and Reconstructive Surgery  Department of Obstetrics & Gynecology

## 2024-05-30 ENCOUNTER — PATIENT MESSAGE (OUTPATIENT)
Dept: FAMILY MEDICINE | Facility: CLINIC | Age: 79
End: 2024-05-30
Payer: MEDICARE

## 2024-05-30 DIAGNOSIS — R26.89 BALANCE PROBLEM: Primary | ICD-10-CM

## 2024-06-05 ENCOUNTER — TELEPHONE (OUTPATIENT)
Dept: NEUROLOGY | Facility: CLINIC | Age: 79
End: 2024-06-05
Payer: MEDICARE

## 2024-06-05 NOTE — TELEPHONE ENCOUNTER
Called patient at 2:13pm on 6/5/24 about patient's message. The patient has fallen a few times. Recently she had fallen twice and cannot remember how she had fallen. She is better but wants to talk to some about the falls and PT. I told her I will forward the message to NP Anna. Do to the message being linked to the NP I forward it to her.

## 2024-06-09 ENCOUNTER — PATIENT MESSAGE (OUTPATIENT)
Dept: FAMILY MEDICINE | Facility: CLINIC | Age: 79
End: 2024-06-09
Payer: MEDICARE

## 2024-06-20 ENCOUNTER — CLINICAL SUPPORT (OUTPATIENT)
Dept: REHABILITATION | Facility: HOSPITAL | Age: 79
End: 2024-06-20
Attending: INTERNAL MEDICINE
Payer: MEDICARE

## 2024-06-20 DIAGNOSIS — R26.89 IMBALANCE: Primary | ICD-10-CM

## 2024-06-20 DIAGNOSIS — R29.898 WEAKNESS OF BOTH LOWER EXTREMITIES: ICD-10-CM

## 2024-06-20 DIAGNOSIS — R26.89 BALANCE PROBLEM: ICD-10-CM

## 2024-06-20 PROCEDURE — 97161 PT EVAL LOW COMPLEX 20 MIN: CPT | Mod: PO | Performed by: PHYSICAL THERAPIST

## 2024-06-20 PROCEDURE — 97112 NEUROMUSCULAR REEDUCATION: CPT | Mod: PO | Performed by: PHYSICAL THERAPIST

## 2024-06-20 NOTE — PATIENT INSTRUCTIONS
[email or web address]    Gaze Stabilization: Tip Card  1. Target must remain in focus, not blurry, and appear stationary while head is in motion.  2. Perform exercises with small head movements (45° to either side of midline).  3. Increase speed of head motion so long as target is in focus.  4. If you wear eyeglasses, be sure you can see target through lens (therapist will give specific instructions for bifocal / progressive lenses).  5. These exercises may provoke dizziness or nausea. Work through these symptoms. If too dizzy, slow head movement slightly. Rest between each exercise.  6. Exercises demand concentration; avoid distractions.  7. For safety, perform standing exercises close to a counter, wall, corner, or next to someone.    Copyright © [employer]. All rights reserved.       Gaze Stabilization: Sitting    Keeping eyes on target on plain wall arms' length away, tilt head slightly down and move head side to side for 30 seconds or until symptoms start. Repeat while moving head up and down for 3 seconds or until symptoms start.  Do 2 sessions per day.        Oculomotor: Saccades    Holding two targets positioned side by side shoulder width apart, move eyes quickly from target to target as head stays still. Change targets to hold one above the other, about 8-10 inches apart.  Move 30 seconds each direction or until symptoms start.  Perform sitting.  Repeat 3 times per session. Do 2 sessions per day.         Compensatory Strategies: Corrective Saccades    1. Holding two stationary targets (or thumbs) shoulder width apart, move eyes to target, keep head still.  2. Then slowly move head in direction of target while eyes remain on target.  3/4. Repeat in opposite direction.  Perform sitting. Repeat sequence 3 times per session. Do  2 sessions per day.         Head Rolls    With head in comfortable, centered position, gently roll head in a [place]. Perform slowly  and keep eyes open.  Repeat 10 times in clockwise and counter clockwise directions. Do 2 sessions per day.         Pencil Pushes  © 4899-3125 [employer] All Rights Reserved  While focusing on the tip of a pen, bring it towards the bridge of your nose. Once the tip of the pen splits into two, slowly return to starting position.  Repeat 10 times per session. Do 2 sessions per week.  Position: sitting

## 2024-06-20 NOTE — PLAN OF CARE
[facility]   Physical Therapy Initial Evaluation - Balance & Vestibular   Name: Lauren Junior  Clinic Number: [id number]    Therapy Diagnosis:   Encounter Diagnoses   Name Primary?    Balance problem     Imbalance Yes    Weakness of both lower extremities      Physician: Connor Viramontes MD    Physician Orders: PT Eval and Treat   Medical Diagnosis from Referral:   Balance problem  Evaluation Date: 6/20/2024  Authorization Period Expiration: 12/31/2024  Plan of Care Expiration: 08/16/2024  Progress Note Due: 07/19/2024  Visit # / Visits authorized: 1   FOTO: 1/3    Precautions: Standard and Fall     Time In: 0700  Time Out: 0750  Total Appointment Time (timed & untimed codes): 50 minutes    Subjective      Date of onset: 04/17/2024    History of current condition - Lauren reports: having imbalance for > 3 months noted, insidious onset along with knee twisting as well of the left side. Patient motivated on getting better and is also currently see another PT for her left shoulder fx in another clinic. Patient recalls having the falls only in the yard and not in the house.    Vertigo (spinning/external) -  Imbalance (unsteadiness) +  Dizziness(light-headed/pass out/internal) -  Spontaneous -     Positional changes +    How long did your initial episode last?   Sec: BBPV, SCD -  Min: BPPV, TIA -  Hours: Meniere's, v-neuritis, labyrinthitis -  Days: neuritis, labyrinthitis, vestibular ischemia -  Weeks: CNS(cardiovascular, meds, psychiatric) -    Falls: x1    Does sneezing, holding your breath, coughing exacerbate dizziness?  SCD -  Sensitivity to light, sounds/odors with dizziness? HA? -    Imaging, : none noted    Prior Therapy: PT at [facility] for left shoulder  Social History:  lives with their spouse, enjoys yard work, gardening  Occupation: Retired  Prior Level of Function: independent  Current Level of Function: modified independent, increase time noted with home management, community  ambulation.    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    Deficiency of clotting factor     Factor 11    Elevated blood pressure 09/09/2015    Encounter for blood transfusion 1967    Fibromyalgia     History of hepatitis C     s/p Interferon & Ribavarin     Hyperparathyroidism 11/2023    Hypertension     Hypothyroidism 09/09/2015    Kidney stones     Migraines     Radius fracture 03/07/2022    Thoracic outlet syndrome     s/p B rib resections       Surgical History:   Lauren Junior  has a past surgical history that includes Tonsillectomy; Adenoidectomy; wilmer rib resection; Dilation and curettage of uterus; Hysterectomy; big toe pinned (2012); Cystoscopy w/ stone manipulation; Injection of joint (Left, 08/13/2018); Debridement of tendon (Left, 08/13/2018); Endoscopic gastrocnemius recession (Left, 08/13/2018); Simple mastectomy (Bilateral, 12/09/2021); Knee arthroscopy w/ meniscectomy (Right, 2023); Wrist hardware removal (Left); Open reduction and internal fixation (ORIF) of injury of wrist (Left); lumpectomy, breast (Right); and Parathyroidectomy (Bilateral, 11/17/2023).    Medications:   Lauren has a current medication list which includes the following prescription(s): amitriptyline, amlodipine, estradiol, ezetimibe, hydrocodone-acetaminophen, ibuprofen, levothyroxine, metoprolol succinate, multivitamin, omeprazole, pregabalin, venlafaxine, and vitamin d.    Allergies:   Review of patient's allergies indicates:   Allergen Reactions    Meperidine Itching    Adhesive      Tape    Statins-hmg-coa  "reductase inhibitors      Joint pains       Oxycodone Anxiety     Pt became very agitated, anxious, nauseated shortly after taking oxycodone.         SYSTEMS SCREEN  Red Flags:  - Have you experienced unexplained: abnormal fatigue, SOB, slurred speech, difficulty swallowing, blurred vision, numbness/tingling, poor coordination, unexplained weight loss/gain, unexplained weakness/loss of strength/legs, bowel bladder difficulty, passing out and or lost consciousness? -    Hearing loss: -  (SCD, Menieres, Labrynthitis)    Patients goals: To improve leg strength, and balance     Objective      - Follows commands: 100% of time   - Speech: no deficits     Mental status: alert, oriented to person, place, and time  Appearance: Casually dressed  Behavior:  calm  Attention Span and Concentration:  Normal    Posture observation:  Sitting: lordotic/neutral/kyphotic: kyphotic    Sensation: Light Touch: Intact          Proprioception: Intact          Vibratory sense intact       Strength: manual muscle test grades below   Upper Extremity Strength:   Lower Extremity Strength: WNL, hip 4-/5     CERVICAL SCREEN    VAST: Modified VAS (Vertebral Artery Screen), in sitting (rotation, then extension 10"):   (5 D's, 3 N'S)  R: Negative  L: Negative    Cervical Dizziness Test:    Cervical Torsion Test, H/N differentiation ( neck 45 deg, hold 30" each side, eyes closed )    Smooth Pursuit Neck Torsion Test: CCR & COR   Joint Position Sense:  Proprioception Testing     VESTIBULAR EXAMINATION    Oculomotor Screen in room light (fixation present):   Known eye dysfunction: None   Ocular ROM: WNL    Tracking/Smooth Pursuits:   Vertical Plance: Intact   Horizontal Plane: Impaired: left  Saccades:    Vertical Plane: Intact   Horizontal Plane: Impaired: left  Optokinetic Reflex   Right:    Left:      Convergence: WNL      Spontaneous Nystagmus: Absent   Gaze Holding Nystagmus: Absent     VOR TESTING (Peripheral)  Head Impulse: (WNL/corrective " "saccade R/L) + left    DVA: pretest line *  Post test line(horizontal)     (> or = to 2 lines+)      Post test line( vertical)         (> or = 2 lines +)    Head Shaking without fixation (20")  +/-   Nystagmus/direction:      POSITIONAL CANAL TESTING  Looking for nystagmus (slow phase followed by quick phase to the affected side for BPPV)    Clara Hallpike (posterior / CL anterior)  Right : Negative nystagmus, Negative dizziness > 2 min, Nausea -  Left: Negative nystagmus, Negative dizziness > 2 min, Nausea -    Functional Gait Assessment: ([facility])  1. Gait on level surface =  2   (3) Normal: less than 5.5 sec, no A.D., no imbalance, normal gait pattern, deviates< 6in   (2) Mild impairment: 7-5.6 sec, uses A.D., mild gait deviations, or deviates 6-10 in   (1) Moderate impairment: > 7 sec, slow speed, imbalance, deviates 10-15 in.   (0) Severe impairment: needs assist, deviates >15 in, reach/touch wall  2. Change in Gait Speed = 2   (3) Normal: smooth change w/o loss of balance or gait deviation, deviates < 6 in, significant difference between speeds   (2) Mild impairment: changes speed, but demonstrates mild gait deviations, deviates 6-10 in, OR no deviations but unable to significantly speed, OR uses A.D.   (1) Moderate impairment: minor changes to speed, OR changes speed w/ significant deviations, deviates 10-15 in, OR  Changes speed , but loses balance & recovers   (0) Severe impairment: cannot change speed, deviates >15 in, or loses balance & needs assist  3. Gait with horizontal head turns  = 2   (3) Normal: no change in gait, deviates <6 in   (2) Mild impairment: slight change in speed, deviates 6-10 in, OR uses A.D.   (1) Moderate impairment: moderate change in speed, deviates 10-15 in   (0) Severe impairment: severe disruption of gait, deviates >15in  4. Gait with vertical head turns = 2   (3) Normal: no change in gait, deviates <6 in   (2) Mild impairment: slight change in speed, deviates 6-10 in OR uses " A.D.   (1) Moderate impairment: moderate change in speed, deviates 10-15 in   (0) Severe impairment: severe disruption of gait, deviates >15 in  5. Gait with pivot turns = 3   (3) Normal: performs safely in 3 sec, no LOB   (2) Mild impairment: performs in >3 sec & no LOB, OR turns safely & requires several steps to regain LOB   (1) Moderate impairment: turns slow, OR requires several small steps for balance following turn & stop   (0) Severe impairment: cannot turn safely, needs assist  6. Step over obstacle = 2   (3) Normal: steps over 2 stacked boxes w/o change in speed or LOB   (2) Mild impairment: able to step over 1 box w/o change in speed or LOB   (1) Moderate impairment: steps over 1 box but must slow down, may require VC   (0) Severe impairment: cannot perform w/o assist  7. Gait with Narrow NESHA = 2   (3) Normal: 10 steps no staggering   (2) Mild impairment: 7-9 steps   (1) Moderate impairment: 4-7 steps   (0) Severe impairment: < 4 steps or cannot perform w/o assist  8. Gait with eyes closed = 2   (3) Normal: < 7 sec, no A.D., no LOB, normal gait pattern, deviates <6 in   (2) Mild impairment: 7.1-9 sec, mild gait deviations, deviates 6-10 in   (1) Moderate impairment: > 9 sec, abnormal pattern, LOB, deviates 10-15 in   (0) Severe impairment: cannot perform w/o assist, LOB, deviates >15in  9. Ambulating Backwards = 1   (3) Normal: no A.D., no LOB, normal gait pattern, deviates <6in   (2) Mild impairment: uses A.D., slower speed, mild gait deviations, deviates 6-10 in   (1) Moderate impairment: slow speed, abnormal gait pattern, LOB, deviates 10-15 in   (0) Severe impairment: severe gait deviations or LOB, deviates >15in  10. Steps = 2   (3) Normal: alternating feet, no rail   (2) Mild Impairment: alternating feet, uses rail   (1) Moderate impairment: step-to, uses rail   (0) Severe impairment: cannot perform safely    Score 20/30     Score:   <22/30 fall risk   <20/30 fall risk in older adults   <18/30 fall  "risk in Parkinsons      Postural Stability/VSR:  [place] SOP TEST: (20" each)                       (N=Normal,S=Sway,F=Fall,R=Right,L=Left)  1.Eyes open/feet shoulder width/Firm                             N   2.Eyes closed/feet shoulder width/Firm                             N   3.Tandem stance/eyes open/firm                             N   4.Tandem stance/eyes closed/firm                             S   5.Eyes open/feet shoulder width/D-Airex                             S   6.Eyes closed/feet shoulder width/D-Airex                             S/F   7.Eyes closed/March                             NT        Evaluation 06/20/2024   Single Limb Stance R LE 0  (<10 sec = HIGH FALL RISK)   Single Limb Stance L LE 0  (<10 sec = HIGH FALL RISK)      Evaluation 06/20/2024   Timed Up and Go 19 sec  < 20 sec safe for independent transfers, < 30 sec safe for dependent transfers/assist required   Self Selected Walking Speed  m/sec (6m/s)   Fast Walking Speed DNT        Evaluation 06/20/2024   30 second Chair Rise  (adults > 59 y/o)  completed  arms   5 times sit-stand  (adults 18-65 y/o) 14 seconds  >12 sec= fall risk for general elderly  >16 sec= fall risk for Parkinson's disease  >10 sec= balance/vestibular dysfunction (<59 y/o)  >14.2 sec= balance/vestibular dysfunction (>59 y/o)  >12 sec= fall risk for CVA     Gait Assessment:(if indicated)  - AD used: none but reported using cane as needed pending on her balance  - Assistance: independent today  - Distance: 50    GAIT DEVIATIONS:  Lauren displays the following deviations with ambulation: decreased netta, decreased LLE step length    Impairments contributing to deviations: impaired motor control, postural imbalance    Primary Measure Score Range Intake Score Score Interpretation  Activities-specific Balance Confidence  (ABC) Scale  0% - 100% 63.8 Lower Score = Greater Disability    Treatment      Total Treatment time (time-based codes) separate from Evaluation: 8 minutes " "     Lauren received the treatments listed below:      neuromuscular re-education activities to improve: Balance, Coordination, Kinesthetic, Sense, Proprioception, and Posture for 8 minutes. The following activities were included:  Supine: glut sets, bridges  S/L clam shells  Sit to stands  Seated: VOR. Saccades 30"    Patient Education and Home Exercises       Education provided:   - yes    Written Home Exercises Provided: yes. Exercises were reviewed and Lauren was able to demonstrate them prior to the end of the session.  Lauren demonstrated good  understanding of the education provided. See EMR under Patient Instructions for exercises provided during therapy sessions.      Assessment     Lauren is a 78 y.o. female referred to outpatient Physical Therapy with a medical diagnosis of Balance problem. Patient presents with postural imbalance, vestibular dysfunction, gait abnormality, hip weakness, decreased mobility.    Patient prognosis is Good.   Patient will benefit from skilled outpatient Physical Therapy to address the deficits stated above and in the chart below, provide patient /family education, and to maximize patientt's level of independence.     Plan of care discussed with patient: Yes  Patient's spiritual, cultural and educational needs considered and patient is agreeable to the plan of care and goals as stated below:     Anticipated Barriers for therapy: none    Medical Necessity is demonstrated by the following  History  Co-morbidities and personal factors that may impact the plan of care Co-morbidities:   advanced age, high BMI, history of cancer, HTN, and hx of foot sx's    Personal Factors:   no deficits     high   Examination  Body Structures and Functions, activity limitations and participation restrictions that may impact the plan of care Body Regions:   head  neck  back  lower extremities  trunk    Body Systems:    strength  gross coordinated movement  balance  gait  transfers  transitions  motor " "control    Participation Restrictions:   Home management  Community ambulation    Activity limitations:   Learning and applying knowledge  no deficits    General Tasks and Commands  no deficits    Communication  no deficits    Mobility  lifting and carrying objects  walking    Self care  no deficits    Domestic Life  shopping  cooking  Home management    Interactions/Relationships  no deficits    Life Areas  no deficits    Community and Social Life  no deficits         high   Clinical Presentation stable and uncomplicated low   Decision Making/ Complexity Score: low     Goals:  Short Term Goals: 4 weeks   -Patient will successfully perform supine to sit transfers without dizziness or instability to allow for safe bed mobility.  -Patient will successfully bend over and tie shoes without complaints of dizziness.  -Patient will independently ambulate 50 feet with/without AD while turning head 2 cycles per second for safety and grocery shopping.  -Patient will demonstrate 50 degrees of cervical rotation without dizziness to facilitate a safe return to driving.  -Patient will improve balance function by 3 points on DGI, and FG test.  -Reduce visual and surface dependence to normal function by 25%.  -Demonstrate sit to standing transition: VOR, saccade tolerance up 1' for ambulatory tasks.    Long Term Goals: 12 weeks   -Independent with HEP with no onset of s/s for mobility purposes.  -demonstrate hip MMT > 4/5 for ambulatory tasks.  -improve FGA score > 23 for community ambulatory purposes.  -report >50% improvement with dynamic balance outside the house.  -demonstrate standing on unlevel foam independently 20".    Plan     Plan of care Certification: 6/20/2024 to 08/16/2024.    Outpatient Physical Therapy 2 times weekly for 8 weeks to include the following interventions: Gait Training, Manual Therapy, Moist Heat/ Ice, Neuromuscular Re-ed, Patient Education, Therapeutic Activities, and Therapeutic Exercise.     Gilberto" Carie, PT

## 2024-06-21 ENCOUNTER — OFFICE VISIT (OUTPATIENT)
Dept: FAMILY MEDICINE | Facility: CLINIC | Age: 79
End: 2024-06-21
Payer: MEDICARE

## 2024-06-21 DIAGNOSIS — E03.9 ACQUIRED HYPOTHYROIDISM: ICD-10-CM

## 2024-06-21 DIAGNOSIS — M79.7 FIBROMYALGIA: ICD-10-CM

## 2024-06-21 DIAGNOSIS — E21.3 HYPERPARATHYROIDISM: ICD-10-CM

## 2024-06-21 DIAGNOSIS — E66.01 CLASS 2 SEVERE OBESITY DUE TO EXCESS CALORIES WITH SERIOUS COMORBIDITY AND BODY MASS INDEX (BMI) OF 36.0 TO 36.9 IN ADULT: Primary | ICD-10-CM

## 2024-06-21 DIAGNOSIS — E78.2 MIXED HYPERLIPIDEMIA: ICD-10-CM

## 2024-06-21 DIAGNOSIS — Z85.3 HISTORY OF BREAST CANCER: ICD-10-CM

## 2024-06-21 DIAGNOSIS — I10 ESSENTIAL HYPERTENSION: ICD-10-CM

## 2024-06-21 DIAGNOSIS — K21.9 GASTROESOPHAGEAL REFLUX DISEASE WITHOUT ESOPHAGITIS: ICD-10-CM

## 2024-06-21 RX ORDER — METOPROLOL SUCCINATE 25 MG/1
25 TABLET, EXTENDED RELEASE ORAL DAILY
Qty: 90 TABLET | Refills: 3 | Status: SHIPPED | OUTPATIENT
Start: 2024-06-21 | End: 2025-06-21

## 2024-06-21 RX ORDER — OMEPRAZOLE 40 MG/1
40 CAPSULE, DELAYED RELEASE ORAL NIGHTLY
Qty: 90 CAPSULE | Refills: 3 | Status: SHIPPED | OUTPATIENT
Start: 2024-06-21 | End: 2025-06-21

## 2024-06-21 RX ORDER — AMITRIPTYLINE HYDROCHLORIDE 100 MG/1
100 TABLET ORAL NIGHTLY
Qty: 90 TABLET | Refills: 3 | Status: SHIPPED | OUTPATIENT
Start: 2024-06-21 | End: 2025-06-21

## 2024-06-21 RX ORDER — AMLODIPINE BESYLATE 5 MG/1
5 TABLET ORAL DAILY
Qty: 90 TABLET | Refills: 3 | Status: SHIPPED | OUTPATIENT
Start: 2024-06-21 | End: 2025-06-21

## 2024-06-21 RX ORDER — PREGABALIN 100 MG/1
100 CAPSULE ORAL 2 TIMES DAILY
Qty: 180 CAPSULE | Refills: 1 | Status: SHIPPED | OUTPATIENT
Start: 2024-06-21 | End: 2024-12-18

## 2024-06-21 NOTE — PROGRESS NOTES
[facility]  [facility]   [address] [facility] [address]       Patient ID: Lauren Junior     Chief Complaint:  Follow-up for obesity    The patient location is:  [place]  The chief complaint leading to consultation is:  Follow-up for obesity    Visit type: audiovisual    Face to Face time with patient:  11 minutes  Fifteen minutes of total time spent on the encounter, which includes face to face time and non-face to face time preparing to see the patient (eg, review of tests), Obtaining and/or reviewing separately obtained history, Documenting clinical information in the electronic or other health record, Independently interpreting results (not separately reported) and communicating results to the patient/family/caregiver, or Care coordination (not separately reported).         Each patient to whom he or she provides medical services by telemedicine is:  (1) informed of the relationship between the physician and patient and the respective role of any other health care provider with respect to management of the patient; and (2) notified that he or she may decline to receive medical services by telemedicine and may withdraw from such care at any time.    Notes:        HPI:  Follow-up for obesity.  She has had a string of orthopedic issues lately.  Most notably, she had a fall and fractured her left humerus.  That is healing well with physical therapy and nonoperative management, but now her left knee is hurting.  She is seeing her orthopedic doctor again and he is expanding physical therapy to include her left knee.  She is concerned about persistent weight gain because she can not seem to exercise regularly at his interested in weight loss shots.  I told her that has supplies an issue and Medicare may not cover it but I did send in a prescription for Wegovy and we will see if it gets approved.  I did review her labs in her TSH is normal, LDL cholesterol has greatly improved and her HDL has as well.  LDL  is still higher than I like but going to just monitor it for now.  Calcium is normal but I am still waiting on the ionized calcium.  She needs refills on her medications and I have sent those in.  Of note, she does not get mammograms anymore but is monitored by the breast surgeon for her history of breast cancer.    Review of Systems       Left knee pain    Objective:      Physical Exam   Physical Exam       Virtual Visit     Vitals: There were no vitals filed for this visit.     Assessment:           Plan:       Lauren Junior  was seen today for follow-up and may need lab work.    Diagnoses and all orders for this visit:    Diagnoses and all orders for this visit:    Class 2 severe obesity due to excess calories with serious comorbidity and body mass index (BMI) of 36.0 to 36.9 in adult  -     semaglutide, weight loss, 0.25 mg/0.5 mL PnIj; Inject 0.25 mg into the skin once a week.  We will try Wegovy approved    Mixed hyperlipidemia  LDL is still higher than I like but much improved.  Continue Zetia  .  History of breast cancer  Plan per breast surgery    Hyperparathyroidism  Calcium looks good status post parathyroidectomy    Fibromyalgia  -     amitriptyline (ELAVIL) 100 MG tablet; Take 1 tablet (100 mg total) by mouth every evening.  -     pregabalin (LYRICA) 100 MG capsule; Take 1 capsule (100 mg total) by mouth 2 (two) times daily.  Controlled with medications    Essential hypertension  -     amLODIPine (NORVASC) 5 MG tablet; Take 1 tablet (5 mg total) by mouth once daily.  -     metoprolol succinate (TOPROL-XL) 25 MG 24 hr tablet; Take 1 tablet (25 mg total) by mouth once daily.  Controlled medications    Gastroesophageal reflux disease without esophagitis  -     omeprazole (PRILOSEC) 40 MG capsule; Take 1 capsule (40 mg total) by mouth every evening.  Controlled with the omeprazole    Acquired hypothyroidism  Controlled with levothyroxine    Visit today included increased complexity associated with the care  of the episodic problem hypertension hyperlipidemia Obesity addressed and managing the longitudinal care of the patient due to the serious and/or complex managed problem(s) .             Connor Viramontes MD

## 2024-06-24 ENCOUNTER — PATIENT MESSAGE (OUTPATIENT)
Dept: REHABILITATION | Facility: HOSPITAL | Age: 79
End: 2024-06-24
Payer: MEDICARE

## 2024-07-03 ENCOUNTER — PATIENT MESSAGE (OUTPATIENT)
Dept: FAMILY MEDICINE | Facility: CLINIC | Age: 79
End: 2024-07-03
Payer: MEDICARE

## 2024-07-04 ENCOUNTER — PATIENT MESSAGE (OUTPATIENT)
Dept: FAMILY MEDICINE | Facility: CLINIC | Age: 79
End: 2024-07-04
Payer: MEDICARE

## 2024-07-17 DIAGNOSIS — E66.01 CLASS 2 SEVERE OBESITY DUE TO EXCESS CALORIES WITH SERIOUS COMORBIDITY AND BODY MASS INDEX (BMI) OF 36.0 TO 36.9 IN ADULT: ICD-10-CM

## 2024-07-17 NOTE — TELEPHONE ENCOUNTER
Care Due:                  Date            Visit Type   Department     Provider  --------------------------------------------------------------------------------                                ESTABLISHED                              PATIENT -    [facility]  Last Visit: 06-      VIRTUAL      MEDICINE       Connor Viramontes                              EP -                              PRIMARY      [facility]  Next Visit: 12-      CARE ([facility])   MEDICINE       Connor Viramontes                                                            Last  Test          Frequency    Reason                     Performed    Due Date  --------------------------------------------------------------------------------    HBA1C.......  6 months...  semaglutide,.............  11-   05-    [employer] Embedded Care Due Messages. Reference number: [id number].   7/17/2024 1:48:26 PM CDT

## 2024-08-26 ENCOUNTER — PATIENT MESSAGE (OUTPATIENT)
Dept: FAMILY MEDICINE | Facility: CLINIC | Age: 79
End: 2024-08-26
Payer: MEDICARE

## 2024-08-26 DIAGNOSIS — S83.207D TEAR OF MENISCUS OF LEFT KNEE AS CURRENT INJURY, UNSPECIFIED MENISCUS, UNSPECIFIED TEAR TYPE, SUBSEQUENT ENCOUNTER: ICD-10-CM

## 2024-08-26 DIAGNOSIS — R26.89 BALANCE PROBLEM: Primary | ICD-10-CM

## 2024-08-26 DIAGNOSIS — F41.9 ANXIETY: ICD-10-CM

## 2024-08-27 RX ORDER — VENLAFAXINE HYDROCHLORIDE 75 MG/1
75 CAPSULE, EXTENDED RELEASE ORAL DAILY
Qty: 30 CAPSULE | Refills: 11 | Status: SHIPPED | OUTPATIENT
Start: 2024-08-27 | End: 2025-08-27

## 2024-09-17 ENCOUNTER — CLINICAL SUPPORT (OUTPATIENT)
Dept: REHABILITATION | Facility: HOSPITAL | Age: 79
End: 2024-09-17
Payer: MEDICARE

## 2024-09-17 DIAGNOSIS — R29.898 WEAKNESS OF BOTH LOWER EXTREMITIES: ICD-10-CM

## 2024-09-17 DIAGNOSIS — R26.89 IMBALANCE: Primary | ICD-10-CM

## 2024-09-17 PROCEDURE — 97112 NEUROMUSCULAR REEDUCATION: CPT | Mod: PO | Performed by: PHYSICAL THERAPIST

## 2024-09-17 NOTE — PLAN OF CARE
"[facility]  Physical Therapy Plan of Care Note     Name: Lauren Junior  Clinic Number: [id number]    Therapy Diagnosis:   Encounter Diagnoses   Name Primary?    Imbalance Yes    Weakness of both lower extremities      Physician: Connor Viramontes MD    Visit Date: 9/17/2024    Physician Orders: PT Eval and Treat   Medical Diagnosis from Referral:   Balance problem  Evaluation Date: 6/20/2024  Authorization Period Expiration: 12/31/2024  Plan of Care Expiration: 08/16/2024  Progress Note Due: 07/19/2024  Visit # / Visits authorized: 1   Precautions: Standard and Fall   FOTO: 2/3  Functional Level Prior to Evaluation:  Modified independent, increase time noted with home management  Time in: 0800  Time out: 0853  SUBJECTIVE     Update: Patient reported having knee rehab for 2+months and no longer having therapy on it at this time. Patient reported coming to therapy addressing her balance from previous evaluation. Patient does endorse her left shoulder fracture from the fall in the past and has been seeing a therapist addressing her impairments. No vertigo noted.     OBJECTIVE     - Follows commands: 100% of time   - Speech: no deficits      Mental status: alert, oriented to person, place, and time  Appearance: Casually dressed  Behavior:  calm  Attention Span and Concentration:  Normal     Posture observation:  Sitting: lordotic/neutral/kyphotic: kyphotic     Sensation: Light Touch: Intact                     Proprioception: Intact                     Vibratory sense intact                Strength: manual muscle test grades below   Upper Extremity Strength:   Lower Extremity Strength: WNL, hip 4-/5      CERVICAL SCREEN     VAST: Modified VAS (Vertebral Artery Screen), in sitting (rotation, then extension 10"):   (5 D's, 3 N'S)  R: Negative  L: Negative     Cervical Dizziness Test:    Cervical Torsion Test, H/N differentiation ( neck 45 deg, hold 30" each side, eyes closed )    Smooth Pursuit Neck " "Torsion Test: CCR & COR   Joint Position Sense:  Proprioception Testing      VESTIBULAR EXAMINATION     Oculomotor Screen in room light (fixation present):   Known eye dysfunction: None   Ocular ROM: WNL    Tracking/Smooth Pursuits:              Vertical Plance: Intact              Horizontal Plane: Impaired: left  Saccades:               Vertical Plane: Intact              Horizontal Plane: Impaired: left  Optokinetic Reflex              Right:               Left:      Convergence: WNL       Spontaneous Nystagmus: Absent   Gaze Holding Nystagmus: Absent      VOR TESTING (Peripheral)  Head Impulse: (WNL/corrective saccade R/L) + left     DVA: pretest line *                  Post test line(horizontal)                     (> or = to 2 lines+)                                                  Post test line( vertical)                        (> or = 2 lines +)     Head Shaking without fixation (20")  +/-   Nystagmus/direction:       POSITIONAL CANAL TESTING  Looking for nystagmus (slow phase followed by quick phase to the affected side for BPPV)     Argyle Hallpike (posterior / CL anterior)  Right : Negative nystagmus, Negative dizziness > 2 min, Nausea -  Left: Negative nystagmus, Negative dizziness > 2 min, Nausea -     Functional Gait Assessment: (Dynamic)  1. Gait on level surface =  2              (3) Normal: less than 5.5 sec, no A.D., no imbalance, normal gait pattern, deviates< 6in              (2) Mild impairment: 7-5.6 sec, uses A.D., mild gait deviations, or deviates 6-10 in              (1) Moderate impairment: > 7 sec, slow speed, imbalance, deviates 10-15 in.              (0) Severe impairment: needs assist, deviates >15 in, reach/touch wall  2. Change in Gait Speed = 2              (3) Normal: smooth change w/o loss of balance or gait deviation, deviates < 6 in, significant difference between speeds              (2) Mild impairment: changes speed, but demonstrates mild gait deviations, deviates 6-10 in, OR no " deviations but unable to significantly speed, OR uses A.D.              (1) Moderate impairment: minor changes to speed, OR changes speed w/ significant deviations, deviates 10-15 in, OR  Changes speed , but loses balance & recovers              (0) Severe impairment: cannot change speed, deviates >15 in, or loses balance & needs assist  3. Gait with horizontal head turns  = 2              (3) Normal: no change in gait, deviates <6 in              (2) Mild impairment: slight change in speed, deviates 6-10 in, OR uses A.D.              (1) Moderate impairment: moderate change in speed, deviates 10-15 in              (0) Severe impairment: severe disruption of gait, deviates >15in  4. Gait with vertical head turns = 2              (3) Normal: no change in gait, deviates <6 in              (2) Mild impairment: slight change in speed, deviates 6-10 in OR uses A.D.              (1) Moderate impairment: moderate change in speed, deviates 10-15 in              (0) Severe impairment: severe disruption of gait, deviates >15 in  5. Gait with pivot turns = 3              (3) Normal: performs safely in 3 sec, no LOB              (2) Mild impairment: performs in >3 sec & no LOB, OR turns safely & requires several steps to regain LOB              (1) Moderate impairment: turns slow, OR requires several small steps for balance following turn & stop              (0) Severe impairment: cannot turn safely, needs assist  6. Step over obstacle = 2              (3) Normal: steps over 2 stacked boxes w/o change in speed or LOB              (2) Mild impairment: able to step over 1 box w/o change in speed or LOB              (1) Moderate impairment: steps over 1 box but must slow down, may require VC              (0) Severe impairment: cannot perform w/o assist  7. Gait with Narrow NESHA = 2              (3) Normal: 10 steps no staggering              (2) Mild impairment: 7-9 steps              (1) Moderate impairment: 4-7 steps               "(0) Severe impairment: < 4 steps or cannot perform w/o assist  8. Gait with eyes closed = 2              (3) Normal: < 7 sec, no A.D., no LOB, normal gait pattern, deviates <6 in              (2) Mild impairment: 7.1-9 sec, mild gait deviations, deviates 6-10 in              (1) Moderate impairment: > 9 sec, abnormal pattern, LOB, deviates 10-15 in              (0) Severe impairment: cannot perform w/o assist, LOB, deviates >15in  9. Ambulating Backwards = 1              (3) Normal: no A.D., no LOB, normal gait pattern, deviates <6in              (2) Mild impairment: uses A.D., slower speed, mild gait deviations, deviates 6-10 in              (1) Moderate impairment: slow speed, abnormal gait pattern, LOB, deviates 10-15 in              (0) Severe impairment: severe gait deviations or LOB, deviates >15in  10. Steps = 2              (3) Normal: alternating feet, no rail              (2) Mild Impairment: alternating feet, uses rail              (1) Moderate impairment: step-to, uses rail              (0) Severe impairment: cannot perform safely     Score 20/30      Score:   <22/30 fall risk   <20/30 fall risk in older adults   <18/30 fall risk in Parkinsons       Postural Stability/VSR:  LISA SOP TEST: (20" each)                       (N=Normal,S=Sway,F=Fall,R=Right,L=Left)  1.Eyes open/feet shoulder width/Firm                             N   2.Eyes closed/feet shoulder width/Firm                             N   3.Tandem stance/eyes open/firm                             N   4.Tandem stance/eyes closed/firm                             S   5.Eyes open/feet shoulder width/D-Airex                             S   6.Eyes closed/feet shoulder width/D-Airex                             S/F   7.Eyes closed/March                             NT           Evaluation 06/20/2024   Single Limb Stance R LE 0  (<10 sec = HIGH FALL RISK)   Single Limb Stance L LE 0  (<10 sec = HIGH FALL RISK)        Evaluation 06/20/2024   Timed Up and " "Go 19 sec  < 20 sec safe for independent transfers, < 30 sec safe for dependent transfers/assist required   Self Selected Walking Speed  m/sec (6m/s)   Fast Walking Speed DNT           Evaluation 06/20/2024   30 second Chair Rise  (adults > 59 y/o)  completed  arms   5 times sit-stand  (adults 18-63 y/o) 14 seconds  >12 sec= fall risk for general elderly  >16 sec= fall risk for Parkinson's disease  >10 sec= balance/vestibular dysfunction (<59 y/o)  >14.2 sec= balance/vestibular dysfunction (>59 y/o)  >12 sec= fall risk for CVA      Standing: EO to EC on one-airex: 3 x 30" with clinician assistance, VOR x 1  Standing: level ground EO, dual task 3 x 30"  Standing: anterior loading on half foam x 20 each    Gait Assessment:(if indicated)  - AD used: none but reported using cane as needed pending on her balance  - Assistance: independent today  - Distance: 50     GAIT DEVIATIONS:  Lauren displays the following deviations with ambulation: decreased netta, decreased LLE step length     Impairments contributing to deviations: impaired motor control, postural imbalance    ASSESSMENT     Update: Patient demonstrated VOR impairment with catch-up phase of the left side, listing to left side, postural imbalance in standing from NBOS to airex: unlevel ground. Patient may benefit from therapy for balance and vestibular rehab.    Previous Short Term Goals Status:     Short Term Goals: 4 weeks   -Patient will successfully perform supine to sit transfers without dizziness or instability to allow for safe bed mobility.  -Patient will successfully bend over and tie shoes without complaints of dizziness.  -Patient will independently ambulate 50 feet with/without AD while turning head 2 cycles per second for safety and grocery shopping.  -Patient will demonstrate 50 degrees of cervical rotation without dizziness to facilitate a safe return to driving.  -Patient will improve balance function by 3 points on DGI, and FG test.  -Reduce " visual and surface dependence to normal function by 25%.  -Demonstrate sit to standing transition: VOR, saccade tolerance up 1' for ambulatory tasks.     New Short Term Goals Status:   See above  Long Term Goal Status: continue per initial plan of care.  Reasons for Recertification of Therapy:   Vestibular and balance rehab    GOALS  See above    PLAN     Updated Certification Period: 09/17/2024 to 11/15/2024  Recommended Treatment Plan: 2 times per week for 8 weeks:  Gait Training, Manual Therapy, Moist Heat/ Ice, Neuromuscular Re-ed, Patient Education, Therapeutic Activities, and Therapeutic Exercise  Other Recommendations: Thank you for consult.    Gilberto Darnell, PT

## 2024-09-24 ENCOUNTER — CLINICAL SUPPORT (OUTPATIENT)
Dept: REHABILITATION | Facility: HOSPITAL | Age: 79
End: 2024-09-24
Payer: MEDICARE

## 2024-09-24 DIAGNOSIS — R29.898 WEAKNESS OF BOTH LOWER EXTREMITIES: ICD-10-CM

## 2024-09-24 DIAGNOSIS — R26.89 IMBALANCE: Primary | ICD-10-CM

## 2024-09-24 PROCEDURE — 97112 NEUROMUSCULAR REEDUCATION: CPT | Mod: KX,PO | Performed by: PHYSICAL THERAPIST

## 2024-09-24 PROCEDURE — 97110 THERAPEUTIC EXERCISES: CPT | Mod: KX,PO | Performed by: PHYSICAL THERAPIST

## 2024-09-24 NOTE — PROGRESS NOTES
"[facility]   Physical Therapy Treatment Note     Name: Lauren Junior  Clinic Number: [id number]    Therapy Diagnosis:   Encounter Diagnoses   Name Primary?    Imbalance Yes    Weakness of both lower extremities      Physician: Connor Viramontes MD    Visit Date: 9/24/2024  Physician Orders: PT Eval and Treat   Medical Diagnosis from Referral:   Balance problem  Evaluation Date: 6/20/2024  Authorization Period Expiration: 12/31/2024  Plan of Care Expiration: 08/16/2024  Progress Note Due: 07/19/2024  Visit # / Visits authorized: 1   Precautions: Standard and Fall   FOTO: 2/3    Time In: 0800  Time Out: 0850  Total Billable Time: 45 minutes    SUBJECTIVE     Pt reports: having no falls since last session.  She was compliant with home exercise program.  Response to previous treatment: TBA  Functional change: TBA    Pain: 0/10  Location: NA      OBJECTIVE     Objective Measures updated at progress report unless specified.   VESTIBULAR EXAMINATION     Oculomotor Screen in room light (fixation present):   Known eye dysfunction: None   Ocular ROM: WNL    Tracking/Smooth Pursuits:              Vertical Plance: Intact              Horizontal Plane: Impaired: left  Saccades:               Vertical Plane: Intact              Horizontal Plane: Impaired: left  Optokinetic Reflex              Right:               Left:      Convergence: WNL       Spontaneous Nystagmus: Absent   Gaze Holding Nystagmus: Absent      VOR TESTING (Peripheral)  Head Impulse: (WNL/corrective saccade R/L) + left     DVA: pretest line *                  Post test line(horizontal)                     (> or = to 2 lines+)                                                  Post test line( vertical)                        (> or = 2 lines +)     Head Shaking without fixation (20")  +/-   Nystagmus/direction:       POSITIONAL CANAL TESTING  Looking for nystagmus (slow phase followed by quick phase to the affected side for BPPV)     Clara " Hallpike (posterior / CL anterior)  Right : Negative nystagmus, Negative dizziness > 2 min, Nausea -  Left: Negative nystagmus, Negative dizziness > 2 min, Nausea -     Functional Gait Assessment: (Dynamic)  1. Gait on level surface =  2              (3) Normal: less than 5.5 sec, no A.D., no imbalance, normal gait pattern, deviates< 6in              (2) Mild impairment: 7-5.6 sec, uses A.D., mild gait deviations, or deviates 6-10 in              (1) Moderate impairment: > 7 sec, slow speed, imbalance, deviates 10-15 in.              (0) Severe impairment: needs assist, deviates >15 in, reach/touch wall  2. Change in Gait Speed = 2              (3) Normal: smooth change w/o loss of balance or gait deviation, deviates < 6 in, significant difference between speeds              (2) Mild impairment: changes speed, but demonstrates mild gait deviations, deviates 6-10 in, OR no deviations but unable to significantly speed, OR uses A.D.              (1) Moderate impairment: minor changes to speed, OR changes speed w/ significant deviations, deviates 10-15 in, OR  Changes speed , but loses balance & recovers              (0) Severe impairment: cannot change speed, deviates >15 in, or loses balance & needs assist  3. Gait with horizontal head turns  = 2              (3) Normal: no change in gait, deviates <6 in              (2) Mild impairment: slight change in speed, deviates 6-10 in, OR uses A.D.              (1) Moderate impairment: moderate change in speed, deviates 10-15 in              (0) Severe impairment: severe disruption of gait, deviates >15in  4. Gait with vertical head turns = 2              (3) Normal: no change in gait, deviates <6 in              (2) Mild impairment: slight change in speed, deviates 6-10 in OR uses A.D.              (1) Moderate impairment: moderate change in speed, deviates 10-15 in              (0) Severe impairment: severe disruption of gait, deviates >15 in  5. Gait with pivot turns =  3              (3) Normal: performs safely in 3 sec, no LOB              (2) Mild impairment: performs in >3 sec & no LOB, OR turns safely & requires several steps to regain LOB              (1) Moderate impairment: turns slow, OR requires several small steps for balance following turn & stop              (0) Severe impairment: cannot turn safely, needs assist  6. Step over obstacle = 2              (3) Normal: steps over 2 stacked boxes w/o change in speed or LOB              (2) Mild impairment: able to step over 1 box w/o change in speed or LOB              (1) Moderate impairment: steps over 1 box but must slow down, may require VC              (0) Severe impairment: cannot perform w/o assist  7. Gait with Narrow NESHA = 2              (3) Normal: 10 steps no staggering              (2) Mild impairment: 7-9 steps              (1) Moderate impairment: 4-7 steps              (0) Severe impairment: < 4 steps or cannot perform w/o assist  8. Gait with eyes closed = 2              (3) Normal: < 7 sec, no A.D., no LOB, normal gait pattern, deviates <6 in              (2) Mild impairment: 7.1-9 sec, mild gait deviations, deviates 6-10 in              (1) Moderate impairment: > 9 sec, abnormal pattern, LOB, deviates 10-15 in              (0) Severe impairment: cannot perform w/o assist, LOB, deviates >15in  9. Ambulating Backwards = 1              (3) Normal: no A.D., no LOB, normal gait pattern, deviates <6in              (2) Mild impairment: uses A.D., slower speed, mild gait deviations, deviates 6-10 in              (1) Moderate impairment: slow speed, abnormal gait pattern, LOB, deviates 10-15 in              (0) Severe impairment: severe gait deviations or LOB, deviates >15in  10. Steps = 2              (3) Normal: alternating feet, no rail              (2) Mild Impairment: alternating feet, uses rail              (1) Moderate impairment: step-to, uses rail              (0) Severe impairment: cannot perform safely    "  Score 20/30      Score:   <22/30 fall risk   <20/30 fall risk in older adults   <18/30 fall risk in Parkinsons       Postural Stability/VSR:  LISA SOP TEST: (20" each)                       (N=Normal,S=Sway,F=Fall,R=Right,L=Left)  1.Eyes open/feet shoulder width/Firm                             N   2.Eyes closed/feet shoulder width/Firm                             N   3.Tandem stance/eyes open/firm                             N   4.Tandem stance/eyes closed/firm                             S   5.Eyes open/feet shoulder width/D-Airex                             S   6.Eyes closed/feet shoulder width/D-Airex                             S/F   7.Eyes closed/March                             NT           Evaluation 06/20/2024   Single Limb Stance R LE 0  (<10 sec = HIGH FALL RISK)   Single Limb Stance L LE 0  (<10 sec = HIGH FALL RISK)        Evaluation 06/20/2024   Timed Up and Go 19 sec  < 20 sec safe for independent transfers, < 30 sec safe for dependent transfers/assist required   Self Selected Walking Speed  m/sec (6m/s)   Fast Walking Speed DNT           Evaluation 06/20/2024   30 second Chair Rise  (adults > 59 y/o)  completed  arms   5 times sit-stand  (adults 18-63 y/o) 14 seconds  >12 sec= fall risk for general elderly  >16 sec= fall risk for Parkinson's disease  >10 sec= balance/vestibular dysfunction (<59 y/o)  >14.2 sec= balance/vestibular dysfunction (>59 y/o)  >12 sec= fall risk for CVA      Standing: EO to EC on one-airex: 3 x 30" with clinician assistance, VOR x 1  Standing: level ground EO, dual task 3 x 30"  Standing: anterior loading on half foam x 20 each     Gait Assessment:(if indicated)  - AD used: none but reported using cane as needed pending on her balance  - Assistance: independent today  - Distance: 50     GAIT DEVIATIONS:  Lauren displays the following deviations with ambulation: decreased netta, decreased LLE step length     Impairments contributing to deviations: impaired motor control, " "postural imbalance  Treatment     Lauren received the treatments listed below:      therapeutic exercises to develop strength, endurance, ROM, flexibility, posture, and core stabilization for 20 minutes including:  Supine: DL bridge 2/10  Supine: marching with green band 2/10  S/L clam shells with green band 2/10 each  Standing: clearing the threshold 3 x 20", alternating steps    neuromuscular re-education activities to improve: Balance, Kinesthetic, Sense, Proprioception, and Posture for 25 minutes. The following activities were included:  Standing: eyes open to eyes closed on D-airex x 3 of 20"  Standing: VOR x 1, 3 x 1' dual task (horizontal head turns)  Anterior loading: half foam 2/10 each      Patient Education and Home Exercises     Home Exercises Provided and Patient Education Provided     Education provided:   - Yes    Written Home Exercises Provided: Patient instructed to cont prior HEP. Exercises were reviewed and Lauren was able to demonstrate them prior to the end of the session.  Lauren demonstrated good  understanding of the education provided. See EMR under Patient Instructions for exercises provided during therapy sessions    ASSESSMENT     Patient required one arm assist on rail while performing clearing the threshold noted with visual target. Patient worked on hip/core strengthening for ambulatory purposes with no adverse effects. Patient was able to tolerate standing eyes closed on D-airex with CGA noted. One arm assist with VOR x 1 noted with dual tasking.     Lauren Is progressing well towards her goals.   Pt prognosis is Good.     Pt will continue to benefit from skilled outpatient physical therapy to address the deficits listed in the problem list box on initial evaluation, provide pt/family education and to maximize pt's level of independence in the home and community environment.     Pt's spiritual, cultural and educational needs considered and pt agreeable to plan of care and goals.   "   Anticipated barriers to physical therapy: none    Goals:   Previous Short Term Goals Status:     Short Term Goals: 4 weeks   -Patient will successfully perform supine to sit transfers without dizziness or instability to allow for safe bed mobility.  -Patient will successfully bend over and tie shoes without complaints of dizziness.  -Patient will independently ambulate 50 feet with/without AD while turning head 2 cycles per second for safety and grocery shopping.  -Patient will demonstrate 50 degrees of cervical rotation without dizziness to facilitate a safe return to driving.  -Patient will improve balance function by 3 points on DGI, and FG test.  -Reduce visual and surface dependence to normal function by 25%.  -Demonstrate sit to standing transition: VOR, saccade tolerance up 1' for ambulatory tasks.    PLAN     Continue with POC    Gilberto Darnell PT

## 2024-10-01 ENCOUNTER — CLINICAL SUPPORT (OUTPATIENT)
Dept: REHABILITATION | Facility: HOSPITAL | Age: 79
End: 2024-10-01
Payer: MEDICARE

## 2024-10-01 DIAGNOSIS — R29.898 WEAKNESS OF BOTH LOWER EXTREMITIES: ICD-10-CM

## 2024-10-01 DIAGNOSIS — R26.89 IMBALANCE: Primary | ICD-10-CM

## 2024-10-01 PROCEDURE — 97112 NEUROMUSCULAR REEDUCATION: CPT | Mod: KX,PO | Performed by: PHYSICAL THERAPIST

## 2024-10-01 PROCEDURE — 97110 THERAPEUTIC EXERCISES: CPT | Mod: KX,PO | Performed by: PHYSICAL THERAPIST

## 2024-10-01 NOTE — PROGRESS NOTES
"[facility]   Physical Therapy Treatment Note     Name: Lauren Junior  Clinic Number: [id number]    Therapy Diagnosis:   Encounter Diagnoses   Name Primary?    Imbalance Yes    Weakness of both lower extremities      Physician: Connor Viramontes MD    Visit Date: 10/1/2024  Physician Orders: PT Eval and Treat   Medical Diagnosis from Referral:   Balance problem  Evaluation Date: 6/20/2024  Authorization Period Expiration: 12/31/2024  Plan of Care Expiration: 08/16/2024  Progress Note Due: 07/19/2024  Visit # / Visits authorized: 3/20  Precautions: Standard and Fall   FOTO: 2/3    Time In: 0800  Time Out: 0905  Total Billable Time: 53 minutes    SUBJECTIVE     Pt reports: having one of her best days yesterday with walking. Today achy parts of her body but explains probably due to fibromyalgia.  She was compliant with home exercise program.  Response to previous treatment: TBA  Functional change: TBA    Pain: 0/10  Location: NA      OBJECTIVE     Objective Measures updated at progress report unless specified.   VESTIBULAR EXAMINATION     Oculomotor Screen in room light (fixation present):   Known eye dysfunction: None   Ocular ROM: WNL    Tracking/Smooth Pursuits:              Vertical Plance: Intact              Horizontal Plane: Impaired: left  Saccades:               Vertical Plane: Intact              Horizontal Plane: Impaired: left  Optokinetic Reflex              Right:               Left:      Convergence: WNL       Spontaneous Nystagmus: Absent   Gaze Holding Nystagmus: Absent      VOR TESTING (Peripheral)  Head Impulse: (WNL/corrective saccade R/L) + left     DVA: pretest line *                  Post test line(horizontal)                     (> or = to 2 lines+)                                                  Post test line( vertical)                        (> or = 2 lines +)     Head Shaking without fixation (20")  +/-   Nystagmus/direction:       POSITIONAL CANAL TESTING  Looking for " nystagmus (slow phase followed by quick phase to the affected side for BPPV)     Clara Hallpike (posterior / CL anterior)  Right : Negative nystagmus, Negative dizziness > 2 min, Nausea -  Left: Negative nystagmus, Negative dizziness > 2 min, Nausea -     Functional Gait Assessment: (Dynamic)  1. Gait on level surface =  2              (3) Normal: less than 5.5 sec, no A.D., no imbalance, normal gait pattern, deviates< 6in              (2) Mild impairment: 7-5.6 sec, uses A.D., mild gait deviations, or deviates 6-10 in              (1) Moderate impairment: > 7 sec, slow speed, imbalance, deviates 10-15 in.              (0) Severe impairment: needs assist, deviates >15 in, reach/touch wall  2. Change in Gait Speed = 2              (3) Normal: smooth change w/o loss of balance or gait deviation, deviates < 6 in, significant difference between speeds              (2) Mild impairment: changes speed, but demonstrates mild gait deviations, deviates 6-10 in, OR no deviations but unable to significantly speed, OR uses A.D.              (1) Moderate impairment: minor changes to speed, OR changes speed w/ significant deviations, deviates 10-15 in, OR  Changes speed , but loses balance & recovers              (0) Severe impairment: cannot change speed, deviates >15 in, or loses balance & needs assist  3. Gait with horizontal head turns  = 2              (3) Normal: no change in gait, deviates <6 in              (2) Mild impairment: slight change in speed, deviates 6-10 in, OR uses A.D.              (1) Moderate impairment: moderate change in speed, deviates 10-15 in              (0) Severe impairment: severe disruption of gait, deviates >15in  4. Gait with vertical head turns = 2              (3) Normal: no change in gait, deviates <6 in              (2) Mild impairment: slight change in speed, deviates 6-10 in OR uses A.D.              (1) Moderate impairment: moderate change in speed, deviates 10-15 in              (0) Severe  impairment: severe disruption of gait, deviates >15 in  5. Gait with pivot turns = 3              (3) Normal: performs safely in 3 sec, no LOB              (2) Mild impairment: performs in >3 sec & no LOB, OR turns safely & requires several steps to regain LOB              (1) Moderate impairment: turns slow, OR requires several small steps for balance following turn & stop              (0) Severe impairment: cannot turn safely, needs assist  6. Step over obstacle = 2              (3) Normal: steps over 2 stacked boxes w/o change in speed or LOB              (2) Mild impairment: able to step over 1 box w/o change in speed or LOB              (1) Moderate impairment: steps over 1 box but must slow down, may require VC              (0) Severe impairment: cannot perform w/o assist  7. Gait with Narrow NESHA = 2              (3) Normal: 10 steps no staggering              (2) Mild impairment: 7-9 steps              (1) Moderate impairment: 4-7 steps              (0) Severe impairment: < 4 steps or cannot perform w/o assist  8. Gait with eyes closed = 2              (3) Normal: < 7 sec, no A.D., no LOB, normal gait pattern, deviates <6 in              (2) Mild impairment: 7.1-9 sec, mild gait deviations, deviates 6-10 in              (1) Moderate impairment: > 9 sec, abnormal pattern, LOB, deviates 10-15 in              (0) Severe impairment: cannot perform w/o assist, LOB, deviates >15in  9. Ambulating Backwards = 1              (3) Normal: no A.D., no LOB, normal gait pattern, deviates <6in              (2) Mild impairment: uses A.D., slower speed, mild gait deviations, deviates 6-10 in              (1) Moderate impairment: slow speed, abnormal gait pattern, LOB, deviates 10-15 in              (0) Severe impairment: severe gait deviations or LOB, deviates >15in  10. Steps = 2              (3) Normal: alternating feet, no rail              (2) Mild Impairment: alternating feet, uses rail              (1) Moderate  "impairment: step-to, uses rail              (0) Severe impairment: cannot perform safely     Score 20/30      Score:   <22/30 fall risk   <20/30 fall risk in older adults   <18/30 fall risk in Parkinsons       Postural Stability/VSR:  LISA SOP TEST: (20" each)                       (N=Normal,S=Sway,F=Fall,R=Right,L=Left)  1.Eyes open/feet shoulder width/Firm                             N   2.Eyes closed/feet shoulder width/Firm                             N   3.Tandem stance/eyes open/firm                             N   4.Tandem stance/eyes closed/firm                             S   5.Eyes open/feet shoulder width/D-Airex                             S   6.Eyes closed/feet shoulder width/D-Airex                             S/F   7.Eyes closed/March                             NT           Evaluation 06/20/2024   Single Limb Stance R LE 0  (<10 sec = HIGH FALL RISK)   Single Limb Stance L LE 0  (<10 sec = HIGH FALL RISK)        Evaluation 06/20/2024   Timed Up and Go 19 sec  < 20 sec safe for independent transfers, < 30 sec safe for dependent transfers/assist required   Self Selected Walking Speed  m/sec (6m/s)   Fast Walking Speed DNT           Evaluation 06/20/2024   30 second Chair Rise  (adults > 61 y/o)  completed  arms   5 times sit-stand  (adults 18-65 y/o) 14 seconds  >12 sec= fall risk for general elderly  >16 sec= fall risk for Parkinson's disease  >10 sec= balance/vestibular dysfunction (<61 y/o)  >14.2 sec= balance/vestibular dysfunction (>61 y/o)  >12 sec= fall risk for CVA      Standing: EO to EC on one-airex: 3 x 30" with clinician assistance, VOR x 1  Standing: level ground EO, dual task 3 x 30"  Standing: anterior loading on half foam x 20 each     Gait Assessment:(if indicated)  - AD used: none but reported using cane as needed pending on her balance  - Assistance: independent today  - Distance: 50     GAIT DEVIATIONS:  Lauren displays the following deviations with ambulation: decreased netta, " "decreased LLE step length     Impairments contributing to deviations: impaired motor control, postural imbalance  Treatment     Lauren received the treatments listed below:      therapeutic exercises to develop strength, endurance, ROM, flexibility, posture, and core stabilization for 15 minutes including:  Recumbent bike x 10 minutes, low intensity  Supine: DL bridge 2/10 with bridge  Supine: marching with green band 2/10  S/L clam shells with green band 2/10 each  SLR 2/10 each  Standing: clearing the threshold 3 x 20", alternating steps    Leg press:  DL 40# 4/10     neuromuscular re-education activities to improve: Balance, Kinesthetic, Sense, Proprioception, and Posture for 38 minutes. The following activities were included:  Standing: eyes open to eyes closed on D-airex x 3 of 20"  Standing: VOR x 1, 3 x 1' dual task (horizontal head turns)  Anterior loading: half foam 2/10 each  Step ups: 8" box fwd x 20, progress to toe taps 3 x 20      Patient Education and Home Exercises     Home Exercises Provided and Patient Education Provided     Education provided:   - Yes    Written Home Exercises Provided: Patient instructed to cont prior HEP. Exercises were reviewed and Lauren was able to demonstrate them prior to the end of the session.  Lauren demonstrated good  understanding of the education provided. See EMR under Patient Instructions for exercises provided during therapy sessions    ASSESSMENT     Patient moved up to 8" box fwd step ups with CGA noted. Fatigue noted with multiple breaks for energy conservation purposes. No adverse effects.    Lauren Is progressing well towards her goals.   Pt prognosis is Good.     Pt will continue to benefit from skilled outpatient physical therapy to address the deficits listed in the problem list box on initial evaluation, provide pt/family education and to maximize pt's level of independence in the home and community environment.     Pt's spiritual, cultural and educational " needs considered and pt agreeable to plan of care and goals.     Anticipated barriers to physical therapy: none    Goals:   Previous Short Term Goals Status:     Short Term Goals: 4 weeks   -Patient will successfully perform supine to sit transfers without dizziness or instability to allow for safe bed mobility.  -Patient will successfully bend over and tie shoes without complaints of dizziness.  -Patient will independently ambulate 50 feet with/without AD while turning head 2 cycles per second for safety and grocery shopping.  -Patient will demonstrate 50 degrees of cervical rotation without dizziness to facilitate a safe return to driving.  -Patient will improve balance function by 3 points on DGI, and FG test.  -Reduce visual and surface dependence to normal function by 25%.  -Demonstrate sit to standing transition: VOR, saccade tolerance up 1' for ambulatory tasks.    PLAN     Continue with POC    Gilberto Darnell, PT

## 2024-10-07 DIAGNOSIS — I10 ESSENTIAL HYPERTENSION: ICD-10-CM

## 2024-10-07 DIAGNOSIS — E03.9 ACQUIRED HYPOTHYROIDISM: ICD-10-CM

## 2024-10-07 RX ORDER — METOPROLOL SUCCINATE 25 MG/1
25 TABLET, EXTENDED RELEASE ORAL
Qty: 90 TABLET | Refills: 2 | Status: SHIPPED | OUTPATIENT
Start: 2024-10-07

## 2024-10-07 RX ORDER — LEVOTHYROXINE SODIUM 88 UG/1
88 TABLET ORAL EVERY MORNING
Qty: 90 TABLET | Refills: 2 | Status: SHIPPED | OUTPATIENT
Start: 2024-10-07

## 2024-10-07 NOTE — TELEPHONE ENCOUNTER
Care Due:                  Date            Visit Type   Department     Provider  --------------------------------------------------------------------------------                                ESTABLISHED                              PATIENT -    [facility] FAMILY  Last Visit: 06-      VIRTUAL      MEDICINE       Connor Viramontes                              EP -                              PRIMARY      [facility]  Next Visit: 12-      CARE ([facility])   MEDICINE       Connor Viramontes                                                            Last  Test          Frequency    Reason                     Performed    Due Date  --------------------------------------------------------------------------------    HBA1C.......  6 months...  semaglutide,.............  11-   05-    [employer] Embedded Care Due Messages. Reference number: [id number].   10/07/2024 3:40:41 AM CDT

## 2024-10-07 NOTE — TELEPHONE ENCOUNTER
Refill Decision Note   Lauren Junior  is requesting a refill authorization.  Brief Assessment and Rationale for Refill:  Approve     Medication Therapy Plan:  FOVS ACCEPTABLE USE PER ORC PROTOCOL ; RISK MINIMAL WHEN THYROID LABS NORMAL , TSH WNL    Medication Reconciliation Completed: No   Comments:     Provider Staff:     Action is required for this patient.   Please see care gap opportunities below in Care Due Message.     Thanks!  [facility]     Appointments      Date Provider   Last Visit   6/21/2024 Connor Viramontes MD   Next Visit   12/27/2024 Connor Viramontes MD     Note composed:1:18 PM 10/07/2024           Note composed:1:18 PM 10/07/2024

## 2024-10-08 ENCOUNTER — CLINICAL SUPPORT (OUTPATIENT)
Dept: REHABILITATION | Facility: HOSPITAL | Age: 79
End: 2024-10-08
Payer: MEDICARE

## 2024-10-08 DIAGNOSIS — R29.898 WEAKNESS OF BOTH LOWER EXTREMITIES: ICD-10-CM

## 2024-10-08 DIAGNOSIS — R26.89 IMBALANCE: Primary | ICD-10-CM

## 2024-10-08 PROCEDURE — 97112 NEUROMUSCULAR REEDUCATION: CPT | Mod: PO | Performed by: PHYSICAL THERAPIST

## 2024-10-08 PROCEDURE — 97110 THERAPEUTIC EXERCISES: CPT | Mod: PO | Performed by: PHYSICAL THERAPIST

## 2024-10-08 NOTE — PROGRESS NOTES
"[facility]   Physical Therapy Treatment Note     Name: Lauren Junior  Clinic Number: [id number]    Therapy Diagnosis:   Encounter Diagnoses   Name Primary?    Imbalance Yes    Weakness of both lower extremities      Physician: Connor Viramontes MD    Visit Date: 10/8/2024  Physician Orders: PT Eval and Treat   Medical Diagnosis from Referral:   Balance problem  Evaluation Date: 6/20/2024  Authorization Period Expiration: 12/31/2024  Plan of Care Expiration: 08/16/2024  Progress Note Due: 07/19/2024  Visit # / Visits authorized: 4/20  Precautions: Standard and Fall   FOTO: 2/3    Time In: 0800  Time Out: 0900  Total Billable Time: 50 minutes    SUBJECTIVE     Pt reports: walking without cane and has not been using the walls or furniture in the house with walking. No falls noted.  She was compliant with home exercise program.  Response to previous treatment: TBA  Functional change: TBA    Pain: 0/10  Location: NA      OBJECTIVE     Objective Measures updated at progress report unless specified.   VESTIBULAR EXAMINATION     Oculomotor Screen in room light (fixation present):   Known eye dysfunction: None   Ocular ROM: WNL    Tracking/Smooth Pursuits:              Vertical Plance: Intact              Horizontal Plane: Impaired: left  Saccades:               Vertical Plane: Intact              Horizontal Plane: Impaired: left  Optokinetic Reflex              Right:               Left:      Convergence: WNL       Spontaneous Nystagmus: Absent   Gaze Holding Nystagmus: Absent      VOR TESTING (Peripheral)  Head Impulse: (WNL/corrective saccade R/L) + left     DVA: pretest line *                  Post test line(horizontal)                     (> or = to 2 lines+)                                                  Post test line( vertical)                        (> or = 2 lines +)     Head Shaking without fixation (20")  +/-   Nystagmus/direction:       POSITIONAL CANAL TESTING  Looking for nystagmus " (slow phase followed by quick phase to the affected side for BPPV)     Clara Hallpike (posterior / CL anterior)  Right : Negative nystagmus, Negative dizziness > 2 min, Nausea -  Left: Negative nystagmus, Negative dizziness > 2 min, Nausea -     Functional Gait Assessment: (Dynamic)  1. Gait on level surface =  2              (3) Normal: less than 5.5 sec, no A.D., no imbalance, normal gait pattern, deviates< 6in              (2) Mild impairment: 7-5.6 sec, uses A.D., mild gait deviations, or deviates 6-10 in              (1) Moderate impairment: > 7 sec, slow speed, imbalance, deviates 10-15 in.              (0) Severe impairment: needs assist, deviates >15 in, reach/touch wall  2. Change in Gait Speed = 2              (3) Normal: smooth change w/o loss of balance or gait deviation, deviates < 6 in, significant difference between speeds              (2) Mild impairment: changes speed, but demonstrates mild gait deviations, deviates 6-10 in, OR no deviations but unable to significantly speed, OR uses A.D.              (1) Moderate impairment: minor changes to speed, OR changes speed w/ significant deviations, deviates 10-15 in, OR  Changes speed , but loses balance & recovers              (0) Severe impairment: cannot change speed, deviates >15 in, or loses balance & needs assist  3. Gait with horizontal head turns  = 2              (3) Normal: no change in gait, deviates <6 in              (2) Mild impairment: slight change in speed, deviates 6-10 in, OR uses A.D.              (1) Moderate impairment: moderate change in speed, deviates 10-15 in              (0) Severe impairment: severe disruption of gait, deviates >15in  4. Gait with vertical head turns = 2              (3) Normal: no change in gait, deviates <6 in              (2) Mild impairment: slight change in speed, deviates 6-10 in OR uses A.D.              (1) Moderate impairment: moderate change in speed, deviates 10-15 in              (0) Severe  impairment: severe disruption of gait, deviates >15 in  5. Gait with pivot turns = 3              (3) Normal: performs safely in 3 sec, no LOB              (2) Mild impairment: performs in >3 sec & no LOB, OR turns safely & requires several steps to regain LOB              (1) Moderate impairment: turns slow, OR requires several small steps for balance following turn & stop              (0) Severe impairment: cannot turn safely, needs assist  6. Step over obstacle = 2              (3) Normal: steps over 2 stacked boxes w/o change in speed or LOB              (2) Mild impairment: able to step over 1 box w/o change in speed or LOB              (1) Moderate impairment: steps over 1 box but must slow down, may require VC              (0) Severe impairment: cannot perform w/o assist  7. Gait with Narrow NESHA = 2              (3) Normal: 10 steps no staggering              (2) Mild impairment: 7-9 steps              (1) Moderate impairment: 4-7 steps              (0) Severe impairment: < 4 steps or cannot perform w/o assist  8. Gait with eyes closed = 2              (3) Normal: < 7 sec, no A.D., no LOB, normal gait pattern, deviates <6 in              (2) Mild impairment: 7.1-9 sec, mild gait deviations, deviates 6-10 in              (1) Moderate impairment: > 9 sec, abnormal pattern, LOB, deviates 10-15 in              (0) Severe impairment: cannot perform w/o assist, LOB, deviates >15in  9. Ambulating Backwards = 1              (3) Normal: no A.D., no LOB, normal gait pattern, deviates <6in              (2) Mild impairment: uses A.D., slower speed, mild gait deviations, deviates 6-10 in              (1) Moderate impairment: slow speed, abnormal gait pattern, LOB, deviates 10-15 in              (0) Severe impairment: severe gait deviations or LOB, deviates >15in  10. Steps = 2              (3) Normal: alternating feet, no rail              (2) Mild Impairment: alternating feet, uses rail              (1) Moderate  "impairment: step-to, uses rail              (0) Severe impairment: cannot perform safely     Score 20/30      Score:   <22/30 fall risk   <20/30 fall risk in older adults   <18/30 fall risk in Parkinsons       Postural Stability/VSR:  LISA SOP TEST: (20" each)                       (N=Normal,S=Sway,F=Fall,R=Right,L=Left)  1.Eyes open/feet shoulder width/Firm                             N   2.Eyes closed/feet shoulder width/Firm                             N   3.Tandem stance/eyes open/firm                             N   4.Tandem stance/eyes closed/firm                             S   5.Eyes open/feet shoulder width/D-Airex                             S   6.Eyes closed/feet shoulder width/D-Airex                             S/F   7.Eyes closed/March                             NT           Evaluation 06/20/2024   Single Limb Stance R LE 0  (<10 sec = HIGH FALL RISK)   Single Limb Stance L LE 0  (<10 sec = HIGH FALL RISK)        Evaluation 06/20/2024   Timed Up and Go 19 sec  < 20 sec safe for independent transfers, < 30 sec safe for dependent transfers/assist required   Self Selected Walking Speed  m/sec (6m/s)   Fast Walking Speed DNT           Evaluation 06/20/2024   30 second Chair Rise  (adults > 59 y/o)  completed  arms   5 times sit-stand  (adults 18-63 y/o) 14 seconds  >12 sec= fall risk for general elderly  >16 sec= fall risk for Parkinson's disease  >10 sec= balance/vestibular dysfunction (<59 y/o)  >14.2 sec= balance/vestibular dysfunction (>59 y/o)  >12 sec= fall risk for CVA      Standing: EO to EC on one-airex: 3 x 30" with clinician assistance, VOR x 1  Standing: level ground EO, dual task 3 x 30"  Standing: anterior loading on half foam x 20 each     Gait Assessment:(if indicated)  - AD used: none but reported using cane as needed pending on her balance  - Assistance: independent today  - Distance: 50     GAIT DEVIATIONS:  Lauren displays the following deviations with ambulation: decreased netta, " "decreased LLE step length     Impairments contributing to deviations: impaired motor control, postural imbalance  Treatment     Lauren received the treatments listed below:      therapeutic exercises to develop strength, endurance, ROM, flexibility, posture, and core stabilization for 15 minutes including:  Recumbent bike x 10 minutes, low intensity  Supine: DL bridge 3/10 with bridge  Supine: marching with blue band 3/10  S/L clam shells with blue band 2/10 each  SLR 3/10 each  Standing: clearing the threshold 3 x 20", alternating steps    Leg press:  DL 40# 4/10, increase angle     neuromuscular re-education activities to improve: Balance, Kinesthetic, Sense, Proprioception, and Posture for 35 minutes. The following activities were included:  Standing: eyes open to eyes closed on D-airex x 3 of 20"  Standing: VOR x 1, 3 x 1' dual task (horizontal head turns)  Anterior loading: half foam 2/10 each  Step ups: 8" box fwd x 20, progress to toe taps 3 x 20    Next visit: add smooth pursuits, saccades, VOR x 2    Patient Education and Home Exercises     Home Exercises Provided and Patient Education Provided     Education provided:   - Yes    Written Home Exercises Provided: Patient instructed to cont prior HEP. Exercises were reviewed and Lauren was able to demonstrate them prior to the end of the session.  Lauren demonstrated good  understanding of the education provided. See EMR under Patient Instructions for exercises provided during therapy sessions    ASSESSMENT     Patient noted with improvement on dual tasking but required re-direction on slowing speed down with anterior loading. Overall good progress with tolerance up to 1' on VOR x 1. No adverse effects.    Lauren Is progressing well towards her goals.   Pt prognosis is Good.     Pt will continue to benefit from skilled outpatient physical therapy to address the deficits listed in the problem list box on initial evaluation, provide pt/family education and to " maximize pt's level of independence in the home and community environment.     Pt's spiritual, cultural and educational needs considered and pt agreeable to plan of care and goals.     Anticipated barriers to physical therapy: none    Goals:   Previous Short Term Goals Status:     Short Term Goals: 4 weeks   -Patient will successfully perform supine to sit transfers without dizziness or instability to allow for safe bed mobility.  -Patient will successfully bend over and tie shoes without complaints of dizziness.  -Patient will independently ambulate 50 feet with/without AD while turning head 2 cycles per second for safety and grocery shopping.  -Patient will demonstrate 50 degrees of cervical rotation without dizziness to facilitate a safe return to driving.  -Patient will improve balance function by 3 points on DGI, and FG test.  -Reduce visual and surface dependence to normal function by 25%.  -Demonstrate sit to standing transition: VOR, saccade tolerance up 1' for ambulatory tasks.    PLAN     Continue with CHANELLE Darnell, PT

## 2024-10-10 ENCOUNTER — CLINICAL SUPPORT (OUTPATIENT)
Dept: REHABILITATION | Facility: HOSPITAL | Age: 79
End: 2024-10-10
Payer: MEDICARE

## 2024-10-10 DIAGNOSIS — R29.898 WEAKNESS OF BOTH LOWER EXTREMITIES: ICD-10-CM

## 2024-10-10 DIAGNOSIS — R26.89 IMBALANCE: Primary | ICD-10-CM

## 2024-10-10 PROCEDURE — 97110 THERAPEUTIC EXERCISES: CPT | Mod: PO,CQ

## 2024-10-10 PROCEDURE — 97112 NEUROMUSCULAR REEDUCATION: CPT | Mod: PO,CQ

## 2024-10-10 NOTE — PROGRESS NOTES
"  [facility]   Physical Therapy Treatment Note     Name: Lauren Junior  Clinic Number: [id number]    Therapy Diagnosis:   Encounter Diagnoses   Name Primary?    Imbalance Yes    Weakness of both lower extremities      Physician: Connor Viramontes MD    Visit Date: 10/10/2024  Physician Orders: PT Eval and Treat   Medical Diagnosis from Referral:   Balance problem  Evaluation Date: 6/20/2024  Authorization Period Expiration: 12/31/2024  Plan of Care Expiration: 08/16/2024  Progress Note Due: 07/19/2024  Visit # / Visits authorized: 5/20  Precautions: Standard and Fall   FOTO: 2/3    Time In: 7:00 am  Time Out: 8:00 am  Total Billable Time: 60 minutes    SUBJECTIVE     Pt reports: walking more without the cane at home.  She was compliant with home exercise program.  Response to previous treatment: TBA  Functional change: Walking more without the cane at home    Pain: 0/10  Location: NA      OBJECTIVE     Objective Measures updated at progress report unless specified.   VESTIBULAR EXAMINATION     Oculomotor Screen in room light (fixation present):   Known eye dysfunction: None   Ocular ROM: WNL    Tracking/Smooth Pursuits:              Vertical Plance: Intact              Horizontal Plane: Impaired: left  Saccades:               Vertical Plane: Intact              Horizontal Plane: Impaired: left  Optokinetic Reflex              Right:               Left:      Convergence: WNL       Spontaneous Nystagmus: Absent   Gaze Holding Nystagmus: Absent      VOR TESTING (Peripheral)  Head Impulse: (WNL/corrective saccade R/L) + left     DVA: pretest line *                  Post test line(horizontal)                     (> or = to 2 lines+)                                                  Post test line( vertical)                        (> or = 2 lines +)     Head Shaking without fixation (20")  +/-   Nystagmus/direction:       POSITIONAL CANAL TESTING  Looking for nystagmus (slow phase followed by quick " phase to the affected side for BPPV)     Clara Hallpike (posterior / CL anterior)  Right : Negative nystagmus, Negative dizziness > 2 min, Nausea -  Left: Negative nystagmus, Negative dizziness > 2 min, Nausea -     Functional Gait Assessment: (Dynamic)  1. Gait on level surface =  2              (3) Normal: less than 5.5 sec, no A.D., no imbalance, normal gait pattern, deviates< 6in              (2) Mild impairment: 7-5.6 sec, uses A.D., mild gait deviations, or deviates 6-10 in              (1) Moderate impairment: > 7 sec, slow speed, imbalance, deviates 10-15 in.              (0) Severe impairment: needs assist, deviates >15 in, reach/touch wall  2. Change in Gait Speed = 2              (3) Normal: smooth change w/o loss of balance or gait deviation, deviates < 6 in, significant difference between speeds              (2) Mild impairment: changes speed, but demonstrates mild gait deviations, deviates 6-10 in, OR no deviations but unable to significantly speed, OR uses A.D.              (1) Moderate impairment: minor changes to speed, OR changes speed w/ significant deviations, deviates 10-15 in, OR  Changes speed , but loses balance & recovers              (0) Severe impairment: cannot change speed, deviates >15 in, or loses balance & needs assist  3. Gait with horizontal head turns  = 2              (3) Normal: no change in gait, deviates <6 in              (2) Mild impairment: slight change in speed, deviates 6-10 in, OR uses A.D.              (1) Moderate impairment: moderate change in speed, deviates 10-15 in              (0) Severe impairment: severe disruption of gait, deviates >15in  4. Gait with vertical head turns = 2              (3) Normal: no change in gait, deviates <6 in              (2) Mild impairment: slight change in speed, deviates 6-10 in OR uses A.D.              (1) Moderate impairment: moderate change in speed, deviates 10-15 in              (0) Severe impairment: severe disruption of gait,  deviates >15 in  5. Gait with pivot turns = 3              (3) Normal: performs safely in 3 sec, no LOB              (2) Mild impairment: performs in >3 sec & no LOB, OR turns safely & requires several steps to regain LOB              (1) Moderate impairment: turns slow, OR requires several small steps for balance following turn & stop              (0) Severe impairment: cannot turn safely, needs assist  6. Step over obstacle = 2              (3) Normal: steps over 2 stacked boxes w/o change in speed or LOB              (2) Mild impairment: able to step over 1 box w/o change in speed or LOB              (1) Moderate impairment: steps over 1 box but must slow down, may require VC              (0) Severe impairment: cannot perform w/o assist  7. Gait with Narrow NESHA = 2              (3) Normal: 10 steps no staggering              (2) Mild impairment: 7-9 steps              (1) Moderate impairment: 4-7 steps              (0) Severe impairment: < 4 steps or cannot perform w/o assist  8. Gait with eyes closed = 2              (3) Normal: < 7 sec, no A.D., no LOB, normal gait pattern, deviates <6 in              (2) Mild impairment: 7.1-9 sec, mild gait deviations, deviates 6-10 in              (1) Moderate impairment: > 9 sec, abnormal pattern, LOB, deviates 10-15 in              (0) Severe impairment: cannot perform w/o assist, LOB, deviates >15in  9. Ambulating Backwards = 1              (3) Normal: no A.D., no LOB, normal gait pattern, deviates <6in              (2) Mild impairment: uses A.D., slower speed, mild gait deviations, deviates 6-10 in              (1) Moderate impairment: slow speed, abnormal gait pattern, LOB, deviates 10-15 in              (0) Severe impairment: severe gait deviations or LOB, deviates >15in  10. Steps = 2              (3) Normal: alternating feet, no rail              (2) Mild Impairment: alternating feet, uses rail              (1) Moderate impairment: step-to, uses rail              (0)  "Severe impairment: cannot perform safely     Score 20/30      Score:   <22/30 fall risk   <20/30 fall risk in older adults   <18/30 fall risk in Parkinsons       Postural Stability/VSR:  LISA SOP TEST: (20" each)                       (N=Normal,S=Sway,F=Fall,R=Right,L=Left)  1.Eyes open/feet shoulder width/Firm                             N   2.Eyes closed/feet shoulder width/Firm                             N   3.Tandem stance/eyes open/firm                             N   4.Tandem stance/eyes closed/firm                             S   5.Eyes open/feet shoulder width/D-Airex                             S   6.Eyes closed/feet shoulder width/D-Airex                             S/F   7.Eyes closed/March                             NT           Evaluation 06/20/2024   Single Limb Stance R LE 0  (<10 sec = HIGH FALL RISK)   Single Limb Stance L LE 0  (<10 sec = HIGH FALL RISK)        Evaluation 06/20/2024   Timed Up and Go 19 sec  < 20 sec safe for independent transfers, < 30 sec safe for dependent transfers/assist required   Self Selected Walking Speed  m/sec (6m/s)   Fast Walking Speed DNT           Evaluation 06/20/2024   30 second Chair Rise  (adults > 59 y/o)  completed  arms   5 times sit-stand  (adults 18-63 y/o) 14 seconds  >12 sec= fall risk for general elderly  >16 sec= fall risk for Parkinson's disease  >10 sec= balance/vestibular dysfunction (<59 y/o)  >14.2 sec= balance/vestibular dysfunction (>59 y/o)  >12 sec= fall risk for CVA      Standing: EO to EC on one-airex: 3 x 30" with clinician assistance, VOR x 1  Standing: level ground EO, dual task 3 x 30"  Standing: anterior loading on half foam x 20 each     Gait Assessment:(if indicated)  - AD used: none but reported using cane as needed pending on her balance  - Assistance: independent today  - Distance: 50     GAIT DEVIATIONS:  Lauren displays the following deviations with ambulation: decreased netta, decreased LLE step length     Impairments " "contributing to deviations: impaired motor control, postural imbalance  Treatment     Lauren received the treatments listed below:      therapeutic exercises to develop strength, endurance, ROM, flexibility, posture, and core stabilization for 30 minutes including:  Recumbent bike x 10 minutes, low intensity  Supine: DL bridge 3/10 with bridge B TB  Supine: marching with blue band 3/10  S/L clam shells with blue band 2/10 each  SLR 3/10 each  Standing: clearing the threshold 3 x 20", alternating steps    Leg press:  DL 40# 4/10, increase angle    neuromuscular re-education activities to improve: Balance, Kinesthetic, Sense, Proprioception, and Posture for 30 minutes. The following activities were included:  Standing: eyes open to eyes closed on D-airex x 3 of 20"  Standing: VOR x 1, 3 x 1' dual task (horizontal head turns)  Anterior loading: half foam 2/10 each  Step ups: 8" box fwd x 20, progress to toe taps 3 x 20  STS on airex 2 x 10      Next visit: add smooth pursuits, saccades, VOR x 2    Patient Education and Home Exercises     Home Exercises Provided and Patient Education Provided     Education provided:   - Yes    Written Home Exercises Provided: Patient instructed to cont prior HEP. Exercises were reviewed and Lauren was able to demonstrate them prior to the end of the session.  Lauren demonstrated good  understanding of the education provided. See EMR under Patient Instructions for exercises provided during therapy sessions    ASSESSMENT     Patient cont to do well with dual tasking. She conts to progress well with balance activities, but does have difficulty with eyes closed requiring support.   No adverse effects.    Lauren Is progressing well towards her goals.   Pt prognosis is Good.     Pt will continue to benefit from skilled outpatient physical therapy to address the deficits listed in the problem list box on initial evaluation, provide pt/family education and to maximize pt's level of independence in " the home and community environment.     Pt's spiritual, cultural and educational needs considered and pt agreeable to plan of care and goals.     Anticipated barriers to physical therapy: none    Goals:   Previous Short Term Goals Status:     Short Term Goals: 4 weeks   -Patient will successfully perform supine to sit transfers without dizziness or instability to allow for safe bed mobility.  -Patient will successfully bend over and tie shoes without complaints of dizziness.  -Patient will independently ambulate 50 feet with/without AD while turning head 2 cycles per second for safety and grocery shopping.  -Patient will demonstrate 50 degrees of cervical rotation without dizziness to facilitate a safe return to driving.  -Patient will improve balance function by 3 points on DGI, and FG test.  -Reduce visual and surface dependence to normal function by 25%.  -Demonstrate sit to standing transition: VOR, saccade tolerance up 1' for ambulatory tasks.    PLAN     Continue with POC    Floridalma Hodge, PTA

## 2024-10-10 NOTE — PROGRESS NOTES
"[facility]   Physical Therapy Treatment Note     Name: Lauren Junior  Clinic Number: [id number]    Therapy Diagnosis:   Encounter Diagnoses   Name Primary?    Imbalance Yes    Weakness of both lower extremities      Physician: Connor Viramontes MD    Visit Date: 10/10/2024  Physician Orders: PT Eval and Treat   Medical Diagnosis from Referral:   Balance problem  Evaluation Date: 6/20/2024  Authorization Period Expiration: 12/31/2024  Plan of Care Expiration: 08/16/2024  Progress Note Due: 07/19/2024  Visit # / Visits authorized: 5/20  Precautions: Standard and Fall   FOTO: 2/3    Time In: 0800  Time Out: 0900  Total Billable Time: 50 minutes    SUBJECTIVE     Pt reports: walking without cane and has not been using the walls or furniture in the house with walking. No falls noted.  She was compliant with home exercise program.  Response to previous treatment: TBA  Functional change: TBA    Pain: 0/10  Location: NA      OBJECTIVE     Objective Measures updated at progress report unless specified.   VESTIBULAR EXAMINATION     Oculomotor Screen in room light (fixation present):   Known eye dysfunction: None   Ocular ROM: WNL    Tracking/Smooth Pursuits:              Vertical Plance: Intact              Horizontal Plane: Impaired: left  Saccades:               Vertical Plane: Intact              Horizontal Plane: Impaired: left  Optokinetic Reflex              Right:               Left:      Convergence: WNL       Spontaneous Nystagmus: Absent   Gaze Holding Nystagmus: Absent      VOR TESTING (Peripheral)  Head Impulse: (WNL/corrective saccade R/L) + left     DVA: pretest line *                  Post test line(horizontal)                     (> or = to 2 lines+)                                                  Post test line( vertical)                        (> or = 2 lines +)     Head Shaking without fixation (20")  +/-   Nystagmus/direction:       POSITIONAL CANAL TESTING  Looking for nystagmus " (slow phase followed by quick phase to the affected side for BPPV)     Clara Hallpike (posterior / CL anterior)  Right : Negative nystagmus, Negative dizziness > 2 min, Nausea -  Left: Negative nystagmus, Negative dizziness > 2 min, Nausea -     Functional Gait Assessment: (Dynamic)  1. Gait on level surface =  2              (3) Normal: less than 5.5 sec, no A.D., no imbalance, normal gait pattern, deviates< 6in              (2) Mild impairment: 7-5.6 sec, uses A.D., mild gait deviations, or deviates 6-10 in              (1) Moderate impairment: > 7 sec, slow speed, imbalance, deviates 10-15 in.              (0) Severe impairment: needs assist, deviates >15 in, reach/touch wall  2. Change in Gait Speed = 2              (3) Normal: smooth change w/o loss of balance or gait deviation, deviates < 6 in, significant difference between speeds              (2) Mild impairment: changes speed, but demonstrates mild gait deviations, deviates 6-10 in, OR no deviations but unable to significantly speed, OR uses A.D.              (1) Moderate impairment: minor changes to speed, OR changes speed w/ significant deviations, deviates 10-15 in, OR  Changes speed , but loses balance & recovers              (0) Severe impairment: cannot change speed, deviates >15 in, or loses balance & needs assist  3. Gait with horizontal head turns  = 2              (3) Normal: no change in gait, deviates <6 in              (2) Mild impairment: slight change in speed, deviates 6-10 in, OR uses A.D.              (1) Moderate impairment: moderate change in speed, deviates 10-15 in              (0) Severe impairment: severe disruption of gait, deviates >15in  4. Gait with vertical head turns = 2              (3) Normal: no change in gait, deviates <6 in              (2) Mild impairment: slight change in speed, deviates 6-10 in OR uses A.D.              (1) Moderate impairment: moderate change in speed, deviates 10-15 in              (0) Severe  impairment: severe disruption of gait, deviates >15 in  5. Gait with pivot turns = 3              (3) Normal: performs safely in 3 sec, no LOB              (2) Mild impairment: performs in >3 sec & no LOB, OR turns safely & requires several steps to regain LOB              (1) Moderate impairment: turns slow, OR requires several small steps for balance following turn & stop              (0) Severe impairment: cannot turn safely, needs assist  6. Step over obstacle = 2              (3) Normal: steps over 2 stacked boxes w/o change in speed or LOB              (2) Mild impairment: able to step over 1 box w/o change in speed or LOB              (1) Moderate impairment: steps over 1 box but must slow down, may require VC              (0) Severe impairment: cannot perform w/o assist  7. Gait with Narrow NESHA = 2              (3) Normal: 10 steps no staggering              (2) Mild impairment: 7-9 steps              (1) Moderate impairment: 4-7 steps              (0) Severe impairment: < 4 steps or cannot perform w/o assist  8. Gait with eyes closed = 2              (3) Normal: < 7 sec, no A.D., no LOB, normal gait pattern, deviates <6 in              (2) Mild impairment: 7.1-9 sec, mild gait deviations, deviates 6-10 in              (1) Moderate impairment: > 9 sec, abnormal pattern, LOB, deviates 10-15 in              (0) Severe impairment: cannot perform w/o assist, LOB, deviates >15in  9. Ambulating Backwards = 1              (3) Normal: no A.D., no LOB, normal gait pattern, deviates <6in              (2) Mild impairment: uses A.D., slower speed, mild gait deviations, deviates 6-10 in              (1) Moderate impairment: slow speed, abnormal gait pattern, LOB, deviates 10-15 in              (0) Severe impairment: severe gait deviations or LOB, deviates >15in  10. Steps = 2              (3) Normal: alternating feet, no rail              (2) Mild Impairment: alternating feet, uses rail              (1) Moderate  "impairment: step-to, uses rail              (0) Severe impairment: cannot perform safely     Score 20/30      Score:   <22/30 fall risk   <20/30 fall risk in older adults   <18/30 fall risk in Parkinsons       Postural Stability/VSR:  LISA SOP TEST: (20" each)                       (N=Normal,S=Sway,F=Fall,R=Right,L=Left)  1.Eyes open/feet shoulder width/Firm                             N   2.Eyes closed/feet shoulder width/Firm                             N   3.Tandem stance/eyes open/firm                             N   4.Tandem stance/eyes closed/firm                             S   5.Eyes open/feet shoulder width/D-Airex                             S   6.Eyes closed/feet shoulder width/D-Airex                             S/F   7.Eyes closed/March                             NT           Evaluation 06/20/2024   Single Limb Stance R LE 0  (<10 sec = HIGH FALL RISK)   Single Limb Stance L LE 0  (<10 sec = HIGH FALL RISK)        Evaluation 06/20/2024   Timed Up and Go 19 sec  < 20 sec safe for independent transfers, < 30 sec safe for dependent transfers/assist required   Self Selected Walking Speed  m/sec (6m/s)   Fast Walking Speed DNT           Evaluation 06/20/2024   30 second Chair Rise  (adults > 61 y/o)  completed  arms   5 times sit-stand  (adults 18-65 y/o) 14 seconds  >12 sec= fall risk for general elderly  >16 sec= fall risk for Parkinson's disease  >10 sec= balance/vestibular dysfunction (<61 y/o)  >14.2 sec= balance/vestibular dysfunction (>61 y/o)  >12 sec= fall risk for CVA      Standing: EO to EC on one-airex: 3 x 30" with clinician assistance, VOR x 1  Standing: level ground EO, dual task 3 x 30"  Standing: anterior loading on half foam x 20 each     Gait Assessment:(if indicated)  - AD used: none but reported using cane as needed pending on her balance  - Assistance: independent today  - Distance: 50     GAIT DEVIATIONS:  Lauren displays the following deviations with ambulation: decreased netta, " "decreased LLE step length     Impairments contributing to deviations: impaired motor control, postural imbalance  Treatment     Lauren received the treatments listed below:      therapeutic exercises to develop strength, endurance, ROM, flexibility, posture, and core stabilization for 15 minutes including:  Recumbent bike x 10 minutes, low intensity  Supine: DL bridge 3/10 with bridge  Supine: marching with blue band 3/10  S/L clam shells with blue band 2/10 each  SLR 3/10 each  Standing: clearing the threshold 3 x 20", alternating steps    Leg press:  DL 40# 4/10, increase angle     neuromuscular re-education activities to improve: Balance, Kinesthetic, Sense, Proprioception, and Posture for 35 minutes. The following activities were included:  Standing: eyes open to eyes closed on D-airex x 3 of 20"  Standing: VOR x 1, 3 x 1' dual task (horizontal head turns)  Anterior loading: half foam 2/10 each  Step ups: 8" box fwd x 20, progress to toe taps 3 x 20    Next visit: add smooth pursuits, saccades, VOR x 2    Patient Education and Home Exercises     Home Exercises Provided and Patient Education Provided     Education provided:   - Yes    Written Home Exercises Provided: Patient instructed to cont prior HEP. Exercises were reviewed and Lauren was able to demonstrate them prior to the end of the session.  Lauren demonstrated good  understanding of the education provided. See EMR under Patient Instructions for exercises provided during therapy sessions    ASSESSMENT     Patient noted with improvement on dual tasking but required re-direction on slowing speed down with anterior loading. Overall good progress with tolerance up to 1' on VOR x 1. No adverse effects.    Lauren Is progressing well towards her goals.   Pt prognosis is Good.     Pt will continue to benefit from skilled outpatient physical therapy to address the deficits listed in the problem list box on initial evaluation, provide pt/family education and to " maximize pt's level of independence in the home and community environment.     Pt's spiritual, cultural and educational needs considered and pt agreeable to plan of care and goals.     Anticipated barriers to physical therapy: none    Goals:   Previous Short Term Goals Status:     Short Term Goals: 4 weeks   -Patient will successfully perform supine to sit transfers without dizziness or instability to allow for safe bed mobility.  -Patient will successfully bend over and tie shoes without complaints of dizziness.  -Patient will independently ambulate 50 feet with/without AD while turning head 2 cycles per second for safety and grocery shopping.  -Patient will demonstrate 50 degrees of cervical rotation without dizziness to facilitate a safe return to driving.  -Patient will improve balance function by 3 points on DGI, and FG test.  -Reduce visual and surface dependence to normal function by 25%.  -Demonstrate sit to standing transition: VOR, saccade tolerance up 1' for ambulatory tasks.    PLAN     Continue with CHANELLE Darnell, PT

## 2024-10-15 ENCOUNTER — CLINICAL SUPPORT (OUTPATIENT)
Dept: REHABILITATION | Facility: HOSPITAL | Age: 79
End: 2024-10-15
Payer: MEDICARE

## 2024-10-15 DIAGNOSIS — R26.89 IMBALANCE: Primary | ICD-10-CM

## 2024-10-15 DIAGNOSIS — R29.898 WEAKNESS OF BOTH LOWER EXTREMITIES: ICD-10-CM

## 2024-10-15 PROCEDURE — 97112 NEUROMUSCULAR REEDUCATION: CPT | Mod: KX,PO | Performed by: PHYSICAL THERAPIST

## 2024-10-15 PROCEDURE — 97110 THERAPEUTIC EXERCISES: CPT | Mod: KX,PO | Performed by: PHYSICAL THERAPIST

## 2024-10-15 NOTE — PROGRESS NOTES
"  [facility]   Physical Therapy Treatment Note     Name: Lauren Junior  Clinic Number: [id number]    Therapy Diagnosis:   Encounter Diagnoses   Name Primary?    Imbalance Yes    Weakness of both lower extremities      Physician: Connor Viramontes MD    Visit Date: 10/15/2024  Physician Orders: PT Eval and Treat   Medical Diagnosis from Referral:   Balance problem  Evaluation Date: 6/20/2024  Authorization Period Expiration: 12/31/2024  Plan of Care Expiration: 11/15/2024  Progress Note Due: 07/19/2024  Visit # / Visits authorized: 6/20  Precautions: Standard and Fall   FOTO: 2/3    Time In: 0653  Time Out: 0754  Total Billable Time: 53 minutes    SUBJECTIVE     Pt reports: walking longer periods with no falls noted.  She was compliant with home exercise program.  Response to previous treatment: standing and walking longer periods  Functional change: Walking more without the cane at home    Pain: 0/10  Location: NA      OBJECTIVE     Objective Measures updated at progress report unless specified.   VESTIBULAR EXAMINATION     Oculomotor Screen in room light (fixation present):   Known eye dysfunction: None   Ocular ROM: WNL    Tracking/Smooth Pursuits:              Vertical Plance: Intact              Horizontal Plane: Impaired: left  Saccades:               Vertical Plane: Intact              Horizontal Plane: Impaired: left  Optokinetic Reflex              Right:               Left:      Convergence: WNL       Spontaneous Nystagmus: Absent   Gaze Holding Nystagmus: Absent      VOR TESTING (Peripheral)  Head Impulse: (WNL/corrective saccade R/L) + left     DVA: pretest line *                  Post test line(horizontal)                     (> or = to 2 lines+)                                                  Post test line( vertical)                        (> or = 2 lines +)     Head Shaking without fixation (20")  +/-   Nystagmus/direction:       POSITIONAL CANAL TESTING  Looking for nystagmus " (slow phase followed by quick phase to the affected side for BPPV)     [place] Hallpike (posterior / CL anterior)  Right : Negative nystagmus, Negative dizziness > 2 min, Nausea -  Left: Negative nystagmus, Negative dizziness > 2 min, Nausea -     Functional Gait Assessment: (Dynamic)  1. Gait on level surface =  2              (3) Normal: less than 5.5 sec, no A.D., no imbalance, normal gait pattern, deviates< 6in              (2) Mild impairment: 7-5.6 sec, uses A.D., mild gait deviations, or deviates 6-10 in              (1) Moderate impairment: > 7 sec, slow speed, imbalance, deviates 10-15 in.              (0) Severe impairment: needs assist, deviates >15 in, reach/touch wall  2. Change in Gait Speed = 2              (3) Normal: smooth change w/o loss of balance or gait deviation, deviates < 6 in, significant difference between speeds              (2) Mild impairment: changes speed, but demonstrates mild gait deviations, deviates 6-10 in, OR no deviations but unable to significantly speed, OR uses A.D.              (1) Moderate impairment: minor changes to speed, OR changes speed w/ significant deviations, deviates 10-15 in, OR  Changes speed , but loses balance & recovers              (0) Severe impairment: cannot change speed, deviates >15 in, or loses balance & needs assist  3. Gait with horizontal head turns  = 2              (3) Normal: no change in gait, deviates <6 in              (2) Mild impairment: slight change in speed, deviates 6-10 in, OR uses A.D.              (1) Moderate impairment: moderate change in speed, deviates 10-15 in              (0) Severe impairment: severe disruption of gait, deviates >15in  4. Gait with vertical head turns = 2              (3) Normal: no change in gait, deviates <6 in              (2) Mild impairment: slight change in speed, deviates 6-10 in OR uses A.D.              (1) Moderate impairment: moderate change in speed, deviates 10-15 in              (0) Severe  impairment: severe disruption of gait, deviates >15 in  5. Gait with pivot turns = 3              (3) Normal: performs safely in 3 sec, no LOB              (2) Mild impairment: performs in >3 sec & no LOB, OR turns safely & requires several steps to regain LOB              (1) Moderate impairment: turns slow, OR requires several small steps for balance following turn & stop              (0) Severe impairment: cannot turn safely, needs assist  6. Step over obstacle = 2              (3) Normal: steps over 2 stacked boxes w/o change in speed or LOB              (2) Mild impairment: able to step over 1 box w/o change in speed or LOB              (1) Moderate impairment: steps over 1 box but must slow down, may require VC              (0) Severe impairment: cannot perform w/o assist  7. Gait with Narrow NESHA = 2              (3) Normal: 10 steps no staggering              (2) Mild impairment: 7-9 steps              (1) Moderate impairment: 4-7 steps              (0) Severe impairment: < 4 steps or cannot perform w/o assist  8. Gait with eyes closed = 2              (3) Normal: < 7 sec, no A.D., no LOB, normal gait pattern, deviates <6 in              (2) Mild impairment: 7.1-9 sec, mild gait deviations, deviates 6-10 in              (1) Moderate impairment: > 9 sec, abnormal pattern, LOB, deviates 10-15 in              (0) Severe impairment: cannot perform w/o assist, LOB, deviates >15in  9. Ambulating Backwards = 1              (3) Normal: no A.D., no LOB, normal gait pattern, deviates <6in              (2) Mild impairment: uses A.D., slower speed, mild gait deviations, deviates 6-10 in              (1) Moderate impairment: slow speed, abnormal gait pattern, LOB, deviates 10-15 in              (0) Severe impairment: severe gait deviations or LOB, deviates >15in  10. Steps = 2              (3) Normal: alternating feet, no rail              (2) Mild Impairment: alternating feet, uses rail              (1) Moderate  "impairment: step-to, uses rail              (0) Severe impairment: cannot perform safely     Score 20/30      Score:   <22/30 fall risk   <20/30 fall risk in older adults   <18/30 fall risk in Parkinsons       Postural Stability/VSR:  LISA SOP TEST: (20" each)                       (N=Normal,S=Sway,F=Fall,R=Right,L=Left)  1.Eyes open/feet shoulder width/Firm                             N   2.Eyes closed/feet shoulder width/Firm                             N   3.Tandem stance/eyes open/firm                             N   4.Tandem stance/eyes closed/firm                             S   5.Eyes open/feet shoulder width/D-Airex                             S   6.Eyes closed/feet shoulder width/D-Airex                             S/F   7.Eyes closed/March                             NT           Evaluation 06/20/2024   Single Limb Stance R LE 0  (<10 sec = HIGH FALL RISK)   Single Limb Stance L LE 0  (<10 sec = HIGH FALL RISK)        Evaluation 06/20/2024   Timed Up and Go 19 sec  < 20 sec safe for independent transfers, < 30 sec safe for dependent transfers/assist required   Self Selected Walking Speed  m/sec (6m/s)   Fast Walking Speed DNT           Evaluation 06/20/2024   30 second Chair Rise  (adults > 59 y/o)  completed  arms   5 times sit-stand  (adults 18-63 y/o) 14 seconds  >12 sec= fall risk for general elderly  >16 sec= fall risk for Parkinson's disease  >10 sec= balance/vestibular dysfunction (<59 y/o)  >14.2 sec= balance/vestibular dysfunction (>59 y/o)  >12 sec= fall risk for CVA      Standing: EO to EC on one-airex: 3 x 30" with clinician assistance, VOR x 1  Standing: level ground EO, dual task 3 x 30"  Standing: anterior loading on half foam x 20 each     Gait Assessment:(if indicated)  - AD used: none but reported using cane as needed pending on her balance  - Assistance: independent today  - Distance: 50     GAIT DEVIATIONS:  Lauren displays the following deviations with ambulation: decreased netta, " "decreased LLE step length     Impairments contributing to deviations: impaired motor control, postural imbalance  Treatment     Lauren received the treatments listed below:      therapeutic exercises to develop strength, endurance, ROM, flexibility, posture, and core stabilization for 30 minutes including:  Recumbent bike x 10 minutes, low intensity  Supine: TA with blue band 2/10  Supine: DL bridge 3/10 with bridge B-TB  Supine: marching with blue band 3/10  S/L clam shells with blue band 2/10 each  SLR 3/10 each  Standing: clearing the threshold 3 x 20", alternating steps    Leg press:  DL 40# 4/10, increase angle    neuromuscular re-education activities to improve: Balance, Kinesthetic, Sense, Proprioception, and Posture for 23 minutes. The following activities were included:  Clearing threshold: 6" fwd steps 3 x 10 with no UE support  Standing: eyes open to eyes closed on D-airex x 3 of 20"  Standing: VOR x 1, 3 x 1' dual task (horizontal head turns)  Anterior loading: half foam 2/10 each  Step ups: 8" box fwd x 20, progress to toe taps 3 x 20  STS on airex 2 x 10    5 sit to stands: 12" with no UE support  BWD: kick stands 2/10 each    Next visit: add smooth pursuits, saccades, VOR x 2    Patient Education and Home Exercises     Home Exercises Provided and Patient Education Provided     Education provided:   - Yes    Written Home Exercises Provided: Patient instructed to cont prior HEP. Exercises were reviewed and Lauren was able to demonstrate them prior to the end of the session.  Lauren demonstrated good  understanding of the education provided. See EMR under Patient Instructions for exercises provided during therapy sessions    ASSESSMENT     Patient demonstrated improvement in sit to stands with no UE support, improvement in time and good tolerance with balance/vestibular recruitment. No adverse effects.    Lauren Is progressing well towards her goals.   Pt prognosis is Good.     Pt will continue to benefit " from skilled outpatient physical therapy to address the deficits listed in the problem list box on initial evaluation, provide pt/family education and to maximize pt's level of independence in the home and community environment.     Pt's spiritual, cultural and educational needs considered and pt agreeable to plan of care and goals.     Anticipated barriers to physical therapy: none    Goals:   Previous Short Term Goals Status:     Short Term Goals: 4 weeks   -Patient will successfully perform supine to sit transfers without dizziness or instability to allow for safe bed mobility.  -Patient will successfully bend over and tie shoes without complaints of dizziness.  -Patient will independently ambulate 50 feet with/without AD while turning head 2 cycles per second for safety and grocery shopping.  -Patient will demonstrate 50 degrees of cervical rotation without dizziness to facilitate a safe return to driving.  -Patient will improve balance function by 3 points on DGI, and FG test.  -Reduce visual and surface dependence to normal function by 25%.  -Demonstrate sit to standing transition: VOR, saccade tolerance up 1' for ambulatory tasks.    PLAN     Continue with POC    Gilberto Darnell, PT

## 2024-10-17 ENCOUNTER — CLINICAL SUPPORT (OUTPATIENT)
Dept: REHABILITATION | Facility: HOSPITAL | Age: 79
End: 2024-10-17
Payer: MEDICARE

## 2024-10-17 DIAGNOSIS — R26.89 IMBALANCE: Primary | ICD-10-CM

## 2024-10-17 DIAGNOSIS — R29.898 WEAKNESS OF BOTH LOWER EXTREMITIES: ICD-10-CM

## 2024-10-17 PROCEDURE — 97530 THERAPEUTIC ACTIVITIES: CPT | Mod: PO,CQ

## 2024-10-17 PROCEDURE — 97110 THERAPEUTIC EXERCISES: CPT | Mod: PO,CQ

## 2024-10-17 PROCEDURE — 97112 NEUROMUSCULAR REEDUCATION: CPT | Mod: PO,CQ

## 2024-10-17 NOTE — PROGRESS NOTES
"    [facility]   Physical Therapy Treatment Note     Name: Lauren Junior  Clinic Number: [id number]    Therapy Diagnosis:   Encounter Diagnoses   Name Primary?    Imbalance Yes    Weakness of both lower extremities        Physician: Connor Viramontes MD    Visit Date: 10/17/2024  Physician Orders: PT Eval and Treat   Medical Diagnosis from Referral:   Balance problem  Evaluation Date: 6/20/2024  Authorization Period Expiration: 12/31/2024  Plan of Care Expiration: 11/15/2024  Progress Note Due: 07/19/2024  Visit # / Visits authorized: 7/20  Precautions: Standard and Fall   FOTO: 2/3    Time In: 0700  Time Out: 0755  Total Billable Time: 53 minutes    SUBJECTIVE     Pt reports: no falls and have not been feeling better.   She was compliant with home exercise program.  Response to previous treatment: standing and walking longer periods  Functional change: Walking more without the cane at home    Pain: 0/10  Location: NA      OBJECTIVE     Objective Measures updated at progress report unless specified.   VESTIBULAR EXAMINATION     Oculomotor Screen in room light (fixation present):   Known eye dysfunction: None   Ocular ROM: WNL    Tracking/Smooth Pursuits:              Vertical Plance: Intact              Horizontal Plane: Impaired: left  Saccades:               Vertical Plane: Intact              Horizontal Plane: Impaired: left  Optokinetic Reflex              Right:               Left:      Convergence: WNL       Spontaneous Nystagmus: Absent   Gaze Holding Nystagmus: Absent      VOR TESTING (Peripheral)  Head Impulse: (WNL/corrective saccade R/L) + left     DVA: pretest line *                  Post test line(horizontal)                     (> or = to 2 lines+)                                                  Post test line( vertical)                        (> or = 2 lines +)     Head Shaking without fixation (20")  +/-   Nystagmus/direction:       POSITIONAL CANAL TESTING  Looking for " "nystagmus (slow phase followed by quick phase to the affected side for BPPV)     [place] Hallpike (posterior / CL anterior)  Right : Negative nystagmus, Negative dizziness > 2 min, Nausea -  Left: Negative nystagmus, Negative dizziness > 2 min, Nausea -          Impairments contributing to deviations: impaired motor control, postural imbalance  Treatment     Lauren received the treatments listed below:      Therapeutic activity: 30 mins  Standing hip abd #2 2/10  Standing ext #2 2/10  Heel raises 20x  STS on airex 2 x 10  Stepping over medium hurdles 3 trials each    therapeutic exercises to develop strength, endurance, ROM, flexibility, posture, and core stabilization for 10 minutes including:  Recumbent bike x 10 minutes, L4    NP:  Supine: TA with blue band 2/10  Supine: DL bridge 3/10 with bridge B-TB  Supine: marching with blue band 3/10  S/L clam shells with blue band 2/10 each  SLR 3/10 each  Standing: clearing the threshold 3 x 20", alternating steps    Leg press:  DL 40# 4/10, increase angle    neuromuscular re-education activities to improve: Balance, Kinesthetic, Sense, Proprioception, and Posture for 15 minutes. The following activities were included:  Clearing threshold: 6" fwd steps 3 x 10 with no UE support  Standing: eyes open to eyes closed on D-airex x 3 of 20"  Standing: VOR x 1, 3 x 1' dual task (horizontal head turns)  Anterior loading: half foam 2/10 each  Step ups: 8" box fwd x 20, progress to toe taps 3 x 20  Tandem [id number]     5 sit to stands: 12" with no UE support  BWD: kick stands 2/10 each    Next visit: add smooth pursuits, saccades, VOR x 2    Patient Education and Home Exercises     Home Exercises Provided and Patient Education Provided     Education provided:   - Yes    Written Home Exercises Provided: Patient instructed to cont prior HEP. Exercises were reviewed and Lauren was able to demonstrate them prior to the end of the session.  Lauren demonstrated good  understanding of the " education provided. See EMR under Patient Instructions for exercises provided during therapy sessions    ASSESSMENT     Patient progressed to more balance activities with minimal instability with step over hurdles. May need to try smaller ascencion. Overall, pt's balance has been better and no c/o falls. No adverse effects.Cont to progress pt     Lauren Is progressing well towards her goals.   Pt prognosis is Good.     Pt will continue to benefit from skilled outpatient physical therapy to address the deficits listed in the problem list box on initial evaluation, provide pt/family education and to maximize pt's level of independence in the home and community environment.     Pt's spiritual, cultural and educational needs considered and pt agreeable to plan of care and goals.     Anticipated barriers to physical therapy: none    Goals:   Previous Short Term Goals Status:     Short Term Goals: 4 weeks   -Patient will successfully perform supine to sit transfers without dizziness or instability to allow for safe bed mobility.  -Patient will successfully bend over and tie shoes without complaints of dizziness.  -Patient will independently ambulate 50 feet with/without AD while turning head 2 cycles per second for safety and grocery shopping.  -Patient will demonstrate 50 degrees of cervical rotation without dizziness to facilitate a safe return to driving.  -Patient will improve balance function by 3 points on DGI, and FG test.  -Reduce visual and surface dependence to normal function by 25%.  -Demonstrate sit to standing transition: VOR, saccade tolerance up 1' for ambulatory tasks.    PLAN     Continue with POC    Floridalma Hodge, PTA

## 2024-10-24 ENCOUNTER — CLINICAL SUPPORT (OUTPATIENT)
Dept: REHABILITATION | Facility: HOSPITAL | Age: 79
End: 2024-10-24
Payer: MEDICARE

## 2024-10-24 DIAGNOSIS — R29.898 WEAKNESS OF BOTH LOWER EXTREMITIES: ICD-10-CM

## 2024-10-24 DIAGNOSIS — R26.89 IMBALANCE: Primary | ICD-10-CM

## 2024-10-24 PROCEDURE — 97110 THERAPEUTIC EXERCISES: CPT | Mod: KX,PO | Performed by: PHYSICAL THERAPIST

## 2024-10-24 PROCEDURE — 97530 THERAPEUTIC ACTIVITIES: CPT | Mod: KX,PO | Performed by: PHYSICAL THERAPIST

## 2024-10-24 PROCEDURE — 97112 NEUROMUSCULAR REEDUCATION: CPT | Mod: KX,PO | Performed by: PHYSICAL THERAPIST

## 2024-10-24 NOTE — PROGRESS NOTES
"    [facility]   Physical Therapy Treatment Note     Name: Lauren Junior  Clinic Number: [id number]    Therapy Diagnosis:   Encounter Diagnoses   Name Primary?    Imbalance Yes    Weakness of both lower extremities        Physician: Connor Viramontes MD    Visit Date: 10/24/2024  Physician Orders: PT Eval and Treat   Medical Diagnosis from Referral:   Balance problem  Evaluation Date: 6/20/2024  Authorization Period Expiration: 12/31/2024  Plan of Care Expiration: 11/15/2024  Progress Note Due: 07/19/2024  Visit # / Visits authorized: 8/20  Precautions: Standard and Fall   FOTO: 3/3    Time In: 0655  Time Out: 0755  Total Billable Time: 53 minutes    SUBJECTIVE     Pt reports: being ready for D/C and doing well with balance. Patient to continue with HEP.  She was compliant with home exercise program.  Response to previous treatment: standing and walking longer periods  Functional change: walking independently    Pain: 0/10  Location: NA      OBJECTIVE     Objective Measures updated at progress report unless specified.   VESTIBULAR EXAMINATION     Oculomotor Screen in room light (fixation present):   Known eye dysfunction: None   Ocular ROM: WNL    Tracking/Smooth Pursuits:              Vertical Plance: Intact              Horizontal Plane: Impaired: left  Saccades:               Vertical Plane: Intact              Horizontal Plane: Impaired: left  Optokinetic Reflex              Right:               Left:      Convergence: WNL       Spontaneous Nystagmus: Absent   Gaze Holding Nystagmus: Absent      VOR TESTING (Peripheral)  Head Impulse: (WNL/corrective saccade R/L) + left     DVA: pretest line *                  Post test line(horizontal)                     (> or = to 2 lines+)                                                  Post test line( vertical)                        (> or = 2 lines +)     Head Shaking without fixation (20")  +/-   Nystagmus/direction:       POSITIONAL CANAL " "TESTING  Looking for nystagmus (slow phase followed by quick phase to the affected side for BPPV)     [place] Hallpike (posterior / CL anterior)  Right : Negative nystagmus, Negative dizziness > 2 min, Nausea -  Left: Negative nystagmus, Negative dizziness > 2 min, Nausea -     Impairments contributing to deviations: impaired motor control, postural imbalance  Treatment     Lauren received the treatments listed below:      Therapeutic activity: 28 mins  Standing hip abd #2 2/10  Standing ext #2 2/10  Heel raises 20x  STS on airex 2 x 10  Stepping over medium hurdles 3 trials each    therapeutic exercises to develop strength, endurance, ROM, flexibility, posture, and core stabilization for 10 minutes including:  Recumbent bike x 10 minutes, L4    NP:  Supine: TA with blue band 2/10  Supine: DL bridge 3/10 with bridge B-TB  Supine: marching with blue band 3/10  S/L clam shells with blue band 2/10 each  SLR 3/10 each  Standing: clearing the threshold 3 x 20", alternating steps    Leg press:  DL 40# 4/10, increase angle    neuromuscular re-education activities to improve: Balance, Kinesthetic, Sense, Proprioception, and Posture for 15 minutes. The following activities were included:  Clearing threshold: 6" fwd steps 3 x 10 with no UE support  Standing: eyes open to eyes closed on D-airex x 3 of 20"  Standing: VOR x 1, 3 x 1' dual task (horizontal head turns)  Anterior loading: half foam 2/10 each  Step ups: 8" box fwd x 20, progress to toe taps 3 x 20  Tandem [id number]     5 sit to stands: 12" with no UE support  BWD: kick stands 2/10 each: 3 x 1'    Patient Education and Home Exercises     Home Exercises Provided and Patient Education Provided     Education provided:   - Yes    Written Home Exercises Provided: Patient instructed to cont prior HEP. Exercises were reviewed and Lauren was able to demonstrate them prior to the end of the session.  Lauren demonstrated good  understanding of the education provided. See EMR under " Patient Instructions for exercises provided during therapy sessions    ASSESSMENT   Goals Met. Good tolerance with no adverse effects.    Lauren Is progressing well towards her goals.   Pt prognosis is Good.     Pt's spiritual, cultural and educational needs considered and pt agreeable to plan of care and goals.     Anticipated barriers to physical therapy: none    Goals:   Previous Short Term Goals Status:     Short Term Goals: 4 weeks   -Patient will successfully perform supine to sit transfers without dizziness or instability to allow for safe bed mobility. Met  -Patient will successfully bend over and tie shoes without complaints of dizziness. Met  -Patient will independently ambulate 50 feet with/without AD while turning head 2 cycles per second for safety and grocery shopping. Met  -Patient will demonstrate 50 degrees of cervical rotation without dizziness to facilitate a safe return to driving. Met  -Patient will improve balance function by 3 points on DGI, and FG test. Met  -Reduce visual and surface dependence to normal function by 25%. Met  -Demonstrate sit to standing transition: VOR, saccade tolerance up 1' for ambulatory tasks. Met    PLAN   D/C, HEP    Gilberto Darnell, PT

## 2024-12-09 ENCOUNTER — OFFICE VISIT (OUTPATIENT)
Dept: UROGYNECOLOGY | Facility: CLINIC | Age: 79
End: 2024-12-09
Payer: MEDICARE

## 2024-12-09 ENCOUNTER — PATIENT MESSAGE (OUTPATIENT)
Dept: UROGYNECOLOGY | Facility: CLINIC | Age: 79
End: 2024-12-09

## 2024-12-09 DIAGNOSIS — K59.09 CHRONIC CONSTIPATION: ICD-10-CM

## 2024-12-09 DIAGNOSIS — N95.2 VAGINAL ATROPHY: Primary | ICD-10-CM

## 2024-12-09 DIAGNOSIS — R39.15 URINARY URGENCY: ICD-10-CM

## 2024-12-09 DIAGNOSIS — N39.3 FEMALE GENUINE STRESS INCONTINENCE: ICD-10-CM

## 2024-12-09 DIAGNOSIS — Z85.3 HISTORY OF BREAST CANCER: ICD-10-CM

## 2024-12-09 PROCEDURE — 99213 OFFICE O/P EST LOW 20 MIN: CPT | Mod: 95,,, | Performed by: OBSTETRICS & GYNECOLOGY

## 2024-12-09 NOTE — PROGRESS NOTES
Urogyn follow up  12/09/2024    [facility] - UROGYNECOLOGY  [address] [id number]    Lauren Junior  [id number]  1945    The patient location is: home  The chief complaint leading to consultation is: follow up    Visit type: audio only--video wasn't working    Face to Face time with patient: 20 min  20 minutes of total time spent on the encounter, which includes face to face time and non-face to face time preparing to see the patient (eg, review of tests), Obtaining and/or reviewing separately obtained history, Documenting clinical information in the electronic or other health record, Independently interpreting results (not separately reported) and communicating results to the patient/family/caregiver, or Care coordination (not separately reported).     Each patient to whom he or she provides medical services by telemedicine is:  (1) informed of the relationship between the physician and patient and the respective role of any other health care provider with respect to management of the patient; and (2) notified that he or she may decline to receive medical services by telemedicine and may withdraw from such care at any time.    Notes:     Lauren Junior is a 79 y.o. here for a urogyn follow up.  The patient's last visit with me was on 5/22/2024.    1)  UI:  (+) KRISTEN (gym, cough, sneeze). (--) UUI.  Improved with 40 lb weight loss but has restarted with 20 lb weight regain.  (+) pads--only when out of home.  Daytime frequency: Q 3 hours.  Nocturia: Yes: 1/night, around 6 AM--can have UUI on way to [place].   (--) dysuria,  (--) hematuria,  (--) frequent UTIs.  (+) complete bladder emptying.  --starting  12/2023:  felt tingling sensation before and during urination--also some tingling in hands; + increased U/F with significant discomfort--feels like painful orgasm--lasted about 20 min; has had some other similar episodes since then  --did not have UTI check  --has not tried meds    2)  POP:   Absent.   (--) vaginal bleeding. (--) vaginal discharge. (--) sexually active-- with ED.  (--) h/o dyspareunia.   (--)  Vaginal dryness.  (--) vaginal estrogen use.     3)  BM:  (+) constipation/straining. Takes miralax QOD.  Tried fiber but didn't like/caused gas. +straining.  (--) chronic diarrhea. (--) hematochezia.  (--) fecal incontinence.  (--) fecal smearing/urgency.  (+) complete evacuation.     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 No chemo or radiation. +E2 sens.   --h/o blood transfusion: ABL from thoracic outlet syndrome surgery  --afib: no blood thinners due to factor 11 clotting deficiency and was bleeding too much; no major EBL from other surgeries  --hyperparathyroidism: s/p parathyroidectomy for her hyperparathyroidism.  PTH levels dropped to normal shortly after surgery and her calcium decreased a few days later.   Lab Results   Component Value Date    TSH 3.460 12/15/2023     Lab Results   Component Value Date    CALCIUM 8.7 04/22/2024    PHOS 3.1 09/15/2021   --fibromyalgia: lyrica + effexor  --migraines: lyrica + effexor    Past Surgical History  Past Surgical History:   Procedure Laterality  Date    ADENOIDECTOMY      big toe pinned      wilmer rib resection      CYSTOSCOPY W/ STONE MANIPULATION      DEBRIDEMENT OF TENDON Left 2018    Procedure: DEBRIDEMENT, TENDON ACHILLES;  Surgeon: Goyo Hunter MD;  Location: [facility] OR;  Service: Orthopedics;  Laterality: Left;    DILATION AND CURETTAGE OF UTERUS      ENDOSCOPIC GASTROCNEMIUS RECESSION Left 2018    Procedure: RECESSION, GASTROCNEMIUS;  Surgeon: Goyo Hunter MD;  Location: [facility] OR;  Service: Orthopedics;  Laterality: Left;    HYSTERECTOMY      and right oophrectomy    INJECTION OF JOINT Left 2018    Procedure: INJECTION, JOINT - Injection Platelet Rich Plasma;  Surgeon: Goyo Hunter MD;  Location: [facility] OR;  Service: Orthopedics;  Laterality: Left;    KNEE ARTHROSCOPY W/ MENISCECTOMY Right     LUMPECTOMY, BREAST Right     OPEN REDUCTION AND INTERNAL FIXATION (ORIF) OF INJURY OF WRIST Left     PARATHYROIDECTOMY Bilateral 2023    Procedure: PARATHYROIDECTOMY;  Surgeon: Pedro Pablo Morton MD;  Location: [facility] OR;  Service: ENT;  Laterality: Bilateral;    SIMPLE MASTECTOMY Bilateral 2021    Procedure: MASTECTOMY, SIMPLE;  Surgeon: Mitzi Gerber MD;  Location: [facility];  Service: General;  Laterality: Bilateral;    TONSILLECTOMY      WRIST HARDWARE REMOVAL Left    B thoracic outlet surgery     Hysterectomy: Yes - menorrhagia  Date: 36 yo.  Indication: menorrhagia  Type: xlap/Pfannenstiel   Cervix present: No  Ovaries present: Yes - left (s/p RSO)  Other procedures at time of hysterectomy:  ?appy    Past Ob History     x 3.  C/s x 0.    Largest infant weight: 7#8oz.  no FAVD. yes episiotomy.      Gynecologic History  LMP: No LMP recorded. Patient has had a hysterectomy.  Age of menarche: 19 yo  Age of menopause: with YUVAL  Menstrual history: h/o menorrhagia  Pap test: post hyst.  History of abnormal paps: No.  History of STIs:  Yes - hep C (blood transfusion)--treated; +HPV--perianal  dysplasia  Mammogram: s/p B mastectomy for breast CA; has annual US + breast exam   Colonoscopy: Date of last: 2020.  Result:  polyp.  Repeat due:  2025.    DEXA:  Date of last: 2022.  Result:  normal.  Repeat due:  per PCP.     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    Hypercalcemia    Ductal carcinoma in situ (DCIS) of right breast    Hyperparathyroidism    Class 2 severe obesity due to excess calories with serious comorbidity and body mass index (BMI) of 36.0 to 36.9 in adult    History of breast cancer    Anxiety    Female genuine stress incontinence    Chronic constipation    Vaginal atrophy    Nondisplaced fracture of left humerus    At risk for allergic reaction to medication    Left upper arm pain    Falls    Drug reaction    Paroxysmal atrial fibrillation    Vulvar atrophy    Imbalance    Weakness of both lower extremities       History since last visit:    1)  Stress incontinence:  --was going to PFPT -- was going well but then had to stop due to femur Fx + allergy to oxycodone   --has been working on decreasing PF tension, then working on strengthening; working on meditation + relaxation  --baseline:(+) KRISTEN (gym, cough, sneeze). (--) UUI.  Improved with 40 lb weight loss but has restarted with 20 lb weight regain.  (+) pads--only when out of home.  Daytime frequency: Q 3 hours.  Nocturia: Yes: 1/night, around 6 AM--can have UUI on way to [place]  --currently: wore diaper around UTI sx in 4/2024; otherwise, not wearing pad unless  out--much better since femur Fx healed; not significantly bothered by it currently unless exercising; did go to [facility] a few times and is practicing those things    2)  Urinary symptoms (fullness around vulva, urinary U/F):  --improved with estrogen cream + PT  --called PCP for UTI symptoms; ordered C&S--didn't complete because UA was neg; took ABX and symptoms went away  --no symptoms currently or often  --didn't take uribel because takes amitriptyline    3)  Constipation:  --controlled  --taking large scoop benefiber QAM + 2 psyllium caps/daily + hydration--controlled    4)  Vaginal atrophy (dryness):    --using vaginal cream 2x/week    Medications:    Current Outpatient Medications:     amitriptyline (ELAVIL) 100 MG tablet, Take 1 tablet (100 mg total) by mouth every evening., Disp: 90 tablet, Rfl: 3    amLODIPine (NORVASC) 5 MG tablet, Take 1 tablet (5 mg total) by mouth once daily., Disp: 90 tablet, Rfl: 3    estradioL (ESTRACE) 0.01 % (0.1 mg/gram) vaginal cream, Place 0.5 g vaginally twice a week. Apply daily for 7 days and then twice weekly afterwards., Disp: 42.5 g, Rfl: 12    ezetimibe (ZETIA) 10 mg tablet, Take 1 tablet (10 mg total) by mouth once daily., Disp: 90 tablet, Rfl: 3    ibuprofen 200 mg Cap, Take 2 tablets by mouth every 6 (six) hours as needed. Indications: pain, Disp: , Rfl:     levothyroxine (SYNTHROID) 88 MCG tablet, TAKE 1 TABLET BY MOUTH EVERY MORNING BEFORE BREAKFAST, Disp: 90 tablet, Rfl: 2    metoprolol succinate (TOPROL-XL) 25 MG 24 hr tablet, TAKE 1 TABLET BY MOUTH EVERY DAY, Disp: 90 tablet, Rfl: 2    multivitamin Tab, Take 1 tablet by mouth once daily., Disp: , Rfl:     omeprazole (PRILOSEC) 40 MG capsule, Take 1 capsule (40 mg total) by mouth every evening., Disp: 90 capsule, Rfl: 3    pregabalin (LYRICA) 100 MG capsule, Take 1 capsule (100 mg total) by mouth 2 (two) times daily., Disp: 180 capsule, Rfl: 1    semaglutide, weight loss, 0.25 mg/0.5 mL PnIj, Inject 0.25 mg into the  skin once a week. (Patient not taking: Reported on 10/28/2024), Disp: 3 mL, Rfl: 3    traMADoL (ULTRAM) 50 mg tablet, Take 50 mg by mouth every 6 (six) hours., Disp: , Rfl:     venlafaxine (EFFEXOR-XR) 75 MG 24 hr capsule, Take 1 capsule (75 mg total) by mouth once daily., Disp: 30 capsule, Rfl: 11    vitamin D 1000 units Tab, Take 1,000 Units by mouth every evening., Disp: , Rfl:     ROS:  As per HPI.      Exam  There were no vitals taken for this visit.  General: alert and oriented, no acute distress  Remainder of PE deferred due to VV.     Impression  1. Vaginal atrophy        2. Chronic constipation        3. Female genuine stress incontinence        4. History of breast cancer        5. Urinary urgency            We reviewed the above issues and discussed options for short-term versus long-term management of their problems.   Plan:   1)  Stress incontinence:  --POS cough stress on previous exam  --Empty bladder every 3 hours.  Empty well: wait a minute, lean forward on toilet.    --Avoid dietary irritants (see sheet).  Keep diary x 3-5 days to determine your irritants.  --KEGELS: do 10 in AM and 10 in PM, holding each x 10 seconds.  When you feel urge to go, STOP, KEGEL, and when urge has passed, then go to bathroom.  Consider PT in future.    --STRESS:  Pessary vs. Sling vs bulking.   --can try poise impressa or revive over the counter in meantime--they can help stress leakage when you exercise; available on amazon    2)  Urinary symptoms:  --probably bladder spasms  --treat vaginal dryness  --Empty bladder every 2-3 hours.  Empty well: wait a minute, lean forward on toilet.    --Avoid dietary irritants (see sheet).  Keep diary x 3-5 days to determine your irritants.  --continue pelvic floor PT exercises at home    --for acute discomfort episodes: try uribel (makes urine blue) up to 4x/day.    --consider daily overactive bladder medication  --STRESS:  Pessary vs. Sling.     3)  Constipation:  --hydrate  well  --continue daily benefiber scoop + psyllium capsules  --AVOID laxatives. Can try miralax if really bad.      4)  Vaginal atrophy (dryness):    --continue Vaginal estrogen:  --Use 0.5 grams of estrogen cream in vagina with applicator or dime-sized amount with finger (as far as can reach internally) at night then twice a week thereafter.  You can also apply a dime-sized amount with your finger around the vaginal opening and inner lips at same frequency.     --Vaginal estrogen may help to decrease pain related to dryness with intercourse and urinary symptoms (urgency/frequency/UTIs) around menopause.   --breast surgeon to see if ok: Mitzi Gerber MD ([facility])--messaged and was ok with this    5)  Return to clinic as needed. Let us know if would like to pursue other treatments.     20 minutes were spent in face to face time with this patient  100 % of this time was spent in counseling and/or coordination of care     Kelly Ching MD  [facility]  Division of Female Pelvic Medicine and Reconstructive Surgery  Department of Obstetrics & Gynecology

## 2024-12-10 ENCOUNTER — TELEPHONE (OUTPATIENT)
Dept: UROGYNECOLOGY | Facility: CLINIC | Age: 79
End: 2024-12-10
Payer: MEDICARE

## 2024-12-13 DIAGNOSIS — E78.2 MIXED HYPERLIPIDEMIA: ICD-10-CM

## 2024-12-13 RX ORDER — EZETIMIBE 10 MG/1
10 TABLET ORAL
Qty: 90 TABLET | Refills: 1 | Status: SHIPPED | OUTPATIENT
Start: 2024-12-13

## 2024-12-13 NOTE — TELEPHONE ENCOUNTER
Care Due:                  Date            Visit Type   Department     Provider  --------------------------------------------------------------------------------                                ESTABLISHED                              PATIENT -    [facility] FAMILY  Last Visit: 06-      VIRTUAL      MEDICINE       Connor Viramontes                              EP -                              PRIMARY      [facility]  Next Visit: 12-      CARE ([facility])   MEDICINE       Connor Viramontes                                                            Last  Test          Frequency    Reason                     Performed    Due Date  --------------------------------------------------------------------------------    HBA1C.......  6 months...  semaglutide,.............  11-   05-    [employer] Embedded Care Due Messages. Reference number: [id number].   12/13/2024 3:39:06 AM CST

## 2024-12-13 NOTE — TELEPHONE ENCOUNTER
Provider Staff:  Action required for this patient    Requires labs      Please see care gap opportunities below in Care Due Message.    Thanks!  [facility]     Appointments      Date Provider   Last Visit   6/21/2024 Connor Viramontes MD   Next Visit   12/27/2024 Connor Viramontes MD     Refill Decision Note   Lauren Junior  is requesting a refill authorization.  Brief Assessment and Rationale for Refill:  Approve     Medication Therapy Plan:         Comments:     Note composed:10:26 AM 12/13/2024

## 2024-12-27 ENCOUNTER — OFFICE VISIT (OUTPATIENT)
Dept: FAMILY MEDICINE | Facility: CLINIC | Age: 79
End: 2024-12-27
Payer: MEDICARE

## 2024-12-27 VITALS — BODY MASS INDEX: 36.03 KG/M2 | HEIGHT: 68 IN | DIASTOLIC BLOOD PRESSURE: 86 MMHG | SYSTOLIC BLOOD PRESSURE: 132 MMHG

## 2024-12-27 DIAGNOSIS — E21.3 HYPERPARATHYROIDISM: ICD-10-CM

## 2024-12-27 DIAGNOSIS — N90.5 VULVAR ATROPHY: ICD-10-CM

## 2024-12-27 DIAGNOSIS — E03.9 ACQUIRED HYPOTHYROIDISM: ICD-10-CM

## 2024-12-27 DIAGNOSIS — F41.9 ANXIETY: ICD-10-CM

## 2024-12-27 DIAGNOSIS — E66.812 CLASS 2 SEVERE OBESITY DUE TO EXCESS CALORIES WITH SERIOUS COMORBIDITY AND BODY MASS INDEX (BMI) OF 36.0 TO 36.9 IN ADULT: ICD-10-CM

## 2024-12-27 DIAGNOSIS — I10 ESSENTIAL HYPERTENSION: ICD-10-CM

## 2024-12-27 DIAGNOSIS — I48.0 PAROXYSMAL ATRIAL FIBRILLATION: ICD-10-CM

## 2024-12-27 DIAGNOSIS — K21.9 GASTROESOPHAGEAL REFLUX DISEASE WITHOUT ESOPHAGITIS: ICD-10-CM

## 2024-12-27 DIAGNOSIS — E66.01 CLASS 2 SEVERE OBESITY DUE TO EXCESS CALORIES WITH SERIOUS COMORBIDITY AND BODY MASS INDEX (BMI) OF 36.0 TO 36.9 IN ADULT: ICD-10-CM

## 2024-12-27 DIAGNOSIS — Z00.00 WELLNESS EXAMINATION: Primary | ICD-10-CM

## 2024-12-27 DIAGNOSIS — E78.2 MIXED HYPERLIPIDEMIA: ICD-10-CM

## 2024-12-27 DIAGNOSIS — Z85.3 HISTORY OF BREAST CANCER: ICD-10-CM

## 2024-12-27 DIAGNOSIS — M79.7 FIBROMYALGIA: ICD-10-CM

## 2024-12-27 PROCEDURE — 99213 OFFICE O/P EST LOW 20 MIN: CPT | Mod: PBBFAC,PO | Performed by: INTERNAL MEDICINE

## 2024-12-27 PROCEDURE — 99999 PR PBB SHADOW E&M-EST. PATIENT-LVL III: CPT | Mod: PBBFAC,,, | Performed by: INTERNAL MEDICINE

## 2024-12-27 RX ORDER — VENLAFAXINE HYDROCHLORIDE 75 MG/1
75 CAPSULE, EXTENDED RELEASE ORAL DAILY
Qty: 90 CAPSULE | Refills: 3 | Status: SHIPPED | OUTPATIENT
Start: 2024-12-27 | End: 2025-12-27

## 2024-12-27 RX ORDER — PREGABALIN 100 MG/1
100 CAPSULE ORAL 3 TIMES DAILY
Qty: 270 CAPSULE | Refills: 1 | Status: SHIPPED | OUTPATIENT
Start: 2024-12-27 | End: 2025-06-25

## 2024-12-27 RX ORDER — EZETIMIBE 10 MG/1
10 TABLET ORAL DAILY
Qty: 90 TABLET | Refills: 3 | Status: SHIPPED | OUTPATIENT
Start: 2024-12-27 | End: 2025-12-27

## 2024-12-27 RX ORDER — OMEPRAZOLE 40 MG/1
40 CAPSULE, DELAYED RELEASE ORAL NIGHTLY
Qty: 90 CAPSULE | Refills: 3 | Status: SHIPPED | OUTPATIENT
Start: 2024-12-27 | End: 2025-12-27

## 2024-12-27 RX ORDER — AMITRIPTYLINE HYDROCHLORIDE 100 MG/1
100 TABLET ORAL NIGHTLY
Qty: 90 TABLET | Refills: 3 | Status: SHIPPED | OUTPATIENT
Start: 2024-12-27 | End: 2025-12-27

## 2024-12-27 RX ORDER — LEVOTHYROXINE SODIUM 88 UG/1
88 TABLET ORAL EVERY MORNING
Qty: 90 TABLET | Refills: 3 | Status: SHIPPED | OUTPATIENT
Start: 2024-12-27 | End: 2025-12-27

## 2024-12-27 RX ORDER — METOPROLOL SUCCINATE 25 MG/1
25 TABLET, EXTENDED RELEASE ORAL DAILY
Qty: 90 TABLET | Refills: 3 | Status: SHIPPED | OUTPATIENT
Start: 2024-12-27 | End: 2025-12-27

## 2024-12-27 RX ORDER — AMLODIPINE BESYLATE 5 MG/1
5 TABLET ORAL DAILY
Qty: 90 TABLET | Refills: 3 | Status: SHIPPED | OUTPATIENT
Start: 2024-12-27 | End: 2025-12-27

## 2024-12-27 RX ORDER — ESTRADIOL 0.1 MG/G
0.5 CREAM VAGINAL
Qty: 42.5 G | Refills: 12 | Status: SHIPPED | OUTPATIENT
Start: 2024-12-30 | End: 2025-12-30

## 2024-12-27 NOTE — PROGRESS NOTES
"[facility]  Primary Care   [address] [facility] [address]       Patient ID: Lauren Junior     Chief Complaint:   Chief Complaint   Patient presents with    Follow-up        HPI:  Annual exam and overall I think she is doing well.  She has finally gone through enough rehab to the point where she feels safe to go to the gym and I do wish her well.  She has had some issues getting refills from her pharmacy so she requests that I refilled all of her medicines at the same time today to [facility] which I have.  She tried semaglutide via a compounding pharmacy but had to stop it due to extreme nausea and some vomiting.  She has looked in to the newer GLP 1 agonist that supposedly are easier on her stomach and we will wait for those to be released hopefully in the next year.  Vital signs look very good.  Labs 6 months ago were reviewed and her LDL cholesterol is still high but improving on Zetia.  Calcium was normal.  I am going to repeat those 2 labs sometime soon and she will probably go to the [facility] to get them done which is fine with me.  We will plan to see each other again in 6 months or sooner if needed and until the and I wish her the best.  Leading    Review of Systems       Negative     Objective:      Physical Exam   Physical Exam       Obese    Vitals:   Vitals:    12/27/24 0955   BP: 132/86   Height: 5' 8" (1.727 m)        Assessment:           Plan:       Lauren Junior  was seen today for follow-up and may need lab work.    Diagnoses and all orders for this visit:    Lauren was seen today for follow-up.    Diagnoses and all orders for this visit:    Wellness examination    Essential hypertension  -     amLODIPine (NORVASC) 5 MG tablet; Take 1 tablet (5 mg total) by mouth once daily.  -     metoprolol succinate (TOPROL-XL) 25 MG 24 hr tablet; Take 1 tablet (25 mg total) by mouth once daily.  Controlled with amlodipine and metoprolol.    Paroxysmal atrial fibrillation  Controlled with " metoprolol.    History of breast cancer  Still being monitored    Class 2 severe obesity due to excess calories with serious comorbidity and body mass index (BMI) of 36.0 to 36.9 in adult  Monitor    Fibromyalgia  -     amitriptyline (ELAVIL) 100 MG tablet; Take 1 tablet (100 mg total) by mouth every evening.  -     pregabalin (LYRICA) 100 MG capsule; Take 1 capsule (100 mg total) by mouth 3 (three) times daily.  Controlled with the Elavil and Lyrica    Vulvar atrophy  -     estradioL (ESTRACE) 0.01 % (0.1 mg/gram) vaginal cream; Place 0.5 g vaginally twice a week. Apply daily for 7 days and then twice weekly afterwards.  Controlled with the estradiol intravaginally    Mixed hyperlipidemia  -     ezetimibe (ZETIA) 10 mg tablet; Take 1 tablet (10 mg total) by mouth once daily.  -     Lipid Panel; Future  LDL is uncontrolled but getting better on Zetia so we will monitor it    Acquired hypothyroidism  -     levothyroxine (SYNTHROID) 88 MCG tablet; Take 1 tablet (88 mcg total) by mouth every morning.  Controlled with levothyroxine    Gastroesophageal reflux disease without esophagitis  -     omeprazole (PRILOSEC) 40 MG capsule; Take 1 capsule (40 mg total) by mouth every evening.  Controlled with the omeprazole    Anxiety  -     venlafaxine (EFFEXOR-XR) 75 MG 24 hr capsule; Take 1 capsule (75 mg total) by mouth once daily.  Controlled with venlafaxine    Hyperparathyroidism  -     CALCIUM, IONIZED, OFFSITE AYAD; Future  -     Comprehensive Metabolic Panel; Future  Calcium has normalized but monitor    Visit today included increased complexity associated with the care of the episodic problem hyperlipidemia fibromyalgia hypertension hypothyroidism hypercalcemia addressed and managing the longitudinal care of the patient due to the serious and/or complex managed problem(s) .           Connor Viramontes MD

## 2025-01-18 ENCOUNTER — OFFICE VISIT (OUTPATIENT)
Dept: URGENT CARE | Facility: CLINIC | Age: 80
End: 2025-01-18
Payer: MEDICARE

## 2025-01-18 VITALS
SYSTOLIC BLOOD PRESSURE: 114 MMHG | HEIGHT: 68 IN | WEIGHT: 236 LBS | DIASTOLIC BLOOD PRESSURE: 72 MMHG | BODY MASS INDEX: 35.77 KG/M2 | HEART RATE: 100 BPM | OXYGEN SATURATION: 95 % | RESPIRATION RATE: 18 BRPM | TEMPERATURE: 98 F

## 2025-01-18 DIAGNOSIS — N30.01 ACUTE CYSTITIS WITH HEMATURIA: ICD-10-CM

## 2025-01-18 DIAGNOSIS — R39.9 UTI SYMPTOMS: Primary | ICD-10-CM

## 2025-01-18 LAB
BILIRUBIN, UA POC OHS: ABNORMAL
BLOOD, UA POC OHS: ABNORMAL
CLARITY, UA POC OHS: ABNORMAL
COLOR, UA POC OHS: YELLOW
GLUCOSE, UA POC OHS: NEGATIVE
KETONES, UA POC OHS: ABNORMAL
LEUKOCYTES, UA POC OHS: ABNORMAL
NITRITE, UA POC OHS: NEGATIVE
PH, UA POC OHS: 6
PROTEIN, UA POC OHS: >=300
SPECIFIC GRAVITY, UA POC OHS: >=1.03
UROBILINOGEN, UA POC OHS: 2

## 2025-01-18 PROCEDURE — 99213 OFFICE O/P EST LOW 20 MIN: CPT | Mod: S$GLB,,, | Performed by: NURSE PRACTITIONER

## 2025-01-18 PROCEDURE — 81003 URINALYSIS AUTO W/O SCOPE: CPT | Mod: QW,S$GLB,, | Performed by: NURSE PRACTITIONER

## 2025-01-18 RX ORDER — NITROFURANTOIN 25; 75 MG/1; MG/1
100 CAPSULE ORAL 2 TIMES DAILY
Qty: 14 CAPSULE | Refills: 0 | Status: SHIPPED | OUTPATIENT
Start: 2025-01-18 | End: 2025-01-25

## 2025-01-18 NOTE — PATIENT INSTRUCTIONS
Drink plenty of fluids  Bring urine back for culture since enough urine could not be obtained today.    Tylenol/Ibuprofen as directed for pain or fever    ED for any further problems or concerns.

## 2025-01-18 NOTE — PROGRESS NOTES
"Subjective:      Patient ID: Lauren Junior is a 79 y.o. female.    Vitals:  height is 5' 8" (1.727 m) and weight is 107 kg (236 lb). Her oral temperature is 98.4 °F (36.9 °C). Her blood pressure is 114/72 and her pulse is 100. Her respiration is 18 and oxygen saturation is 95%.     Chief Complaint: Urinary Tract Infection    Pt presents today with c/o UTI since this morning. Pt has not taken anything for sx. Pt states that both hands hurt due to UTI. Pain level 8/10.    Urinary Tract Infection   This is a new problem. The current episode started today. The problem occurs every urination. The problem has been unchanged. The quality of the pain is described as burning and aching. The pain is at a severity of 8/10. The pain is severe. There has been no fever. Associated symptoms include frequency. She has tried nothing for the symptoms. The treatment provided no relief.       Genitourinary:  Positive for frequency.      Objective:     Physical Exam   Constitutional: She is oriented to person, place, and time. She appears well-developed.   HENT:   Head: Normocephalic and atraumatic.   Ears:   Right Ear: External ear normal.   Left Ear: External ear normal.   Nose: Nose normal.   Mouth/Throat: Mucous membranes are normal.   Eyes: Conjunctivae and lids are normal.   Neck: Trachea normal. Neck supple.   Cardiovascular: Normal rate, regular rhythm and normal heart sounds.   Pulmonary/Chest: Effort normal and breath sounds normal. No respiratory distress.   Abdominal: Normal appearance and bowel sounds are normal. She exhibits no distension and no mass. Soft. There is no abdominal tenderness.   Musculoskeletal: Normal range of motion.         General: Normal range of motion.   Neurological: She is alert and oriented to person, place, and time. She has normal strength.   Skin: Skin is warm, dry, intact, not diaphoretic and not pale.   Psychiatric: Her speech is normal and behavior is normal. Judgment and thought content " normal.   Nursing note and vitals reviewed.      Assessment:     1. UTI symptoms    2. Acute cystitis with hematuria      Results for orders placed or performed in visit on 01/18/25   POCT Urinalysis(Instrument)    Collection Time: 01/18/25  3:28 PM   Result Value Ref Range    Color, POC UA Yellow Yellow, Straw, Colorless    Clarity, POC UA Cloudy (A) Clear    Glucose, POC UA Negative Negative    Bilirubin, POC UA Small (A) Negative    Ketones, POC UA Trace (A) Negative    Spec Grav POC UA >=1.030 1.005 - 1.030    Blood, POC UA Moderate (A) Negative    pH, POC UA 6.0 5.0 - 8.0    Protein, POC UA >=300 (A) Negative    Urobilinogen, POC UA 2.0 (A) <=1.0    Nitrite, POC UA Negative Negative    WBC, POC UA Trace (A) Negative       Plan:       UTI symptoms  -     POCT Urinalysis(Instrument)    Acute cystitis with hematuria  -     nitrofurantoin, macrocrystal-monohydrate, (MACROBID) 100 MG capsule; Take 1 capsule (100 mg total) by mouth 2 (two) times daily. for 7 days  Dispense: 14 capsule; Refill: 0  -     Urine Culture High Risk    Drink plenty of fluids  Bring urine back for culture since enough urine could not be obtained today.    Tylenol/Ibuprofen as directed for pain or fever    ED for any further problems or concerns.

## 2025-02-14 ENCOUNTER — PATIENT MESSAGE (OUTPATIENT)
Dept: FAMILY MEDICINE | Facility: CLINIC | Age: 80
End: 2025-02-14
Payer: MEDICARE

## 2025-02-14 DIAGNOSIS — Z85.3 HISTORY OF BREAST CANCER: Primary | ICD-10-CM

## 2025-02-16 NOTE — TELEPHONE ENCOUNTER
No care due was identified.  [facility] Embedded Care Due Messages. Reference number: [id number].   2/16/2025 12:31:48 PM CST

## 2025-02-17 RX ORDER — TRAMADOL HYDROCHLORIDE 50 MG/1
50 TABLET ORAL EVERY 6 HOURS
Qty: 120 TABLET | Refills: 0 | Status: SHIPPED | OUTPATIENT
Start: 2025-02-17

## 2025-02-21 DIAGNOSIS — Z00.00 ENCOUNTER FOR MEDICARE ANNUAL WELLNESS EXAM: ICD-10-CM

## 2025-03-03 ENCOUNTER — PATIENT MESSAGE (OUTPATIENT)
Dept: FAMILY MEDICINE | Facility: CLINIC | Age: 80
End: 2025-03-03
Payer: MEDICARE

## 2025-03-03 DIAGNOSIS — J45.909 ASTHMA DUE TO SEASONAL ALLERGIES: Primary | ICD-10-CM

## 2025-03-03 DIAGNOSIS — F41.9 ANXIETY: ICD-10-CM

## 2025-03-03 NOTE — TELEPHONE ENCOUNTER
Requested Prescriptions     Pending Prescriptions Disp Refills    venlafaxine (EFFEXOR-XR) 75 MG 24 hr capsule 90 capsule 3     Sig: Take 1 capsule (75 mg total) by mouth once daily.   LOV:12/27/2024  NOV:6/30/2025

## 2025-03-03 NOTE — TELEPHONE ENCOUNTER
No care due was identified.  [facility] Embedded Care Due Messages. Reference number: [id number].   3/03/2025 4:38:20 PM CST

## 2025-03-04 ENCOUNTER — PATIENT MESSAGE (OUTPATIENT)
Dept: FAMILY MEDICINE | Facility: CLINIC | Age: 80
End: 2025-03-04
Payer: MEDICARE

## 2025-03-04 RX ORDER — VENLAFAXINE HYDROCHLORIDE 75 MG/1
150 CAPSULE, EXTENDED RELEASE ORAL DAILY
Qty: 180 CAPSULE | Refills: 3 | Status: SHIPPED | OUTPATIENT
Start: 2025-03-04 | End: 2026-03-04

## 2025-03-05 ENCOUNTER — E-VISIT (OUTPATIENT)
Dept: URGENT CARE | Facility: CLINIC | Age: 80
End: 2025-03-05
Payer: MEDICARE

## 2025-03-05 DIAGNOSIS — R05.9 COUGH, UNSPECIFIED TYPE: Primary | ICD-10-CM

## 2025-03-06 NOTE — PROGRESS NOTES
Patient ID: Lauren Junior is a 79 y.o. female.    Chief Complaint: General Illness (Entered automatically based on patient selection in [employer].)          274}  The patient initiated a request through [employer] on 3/5/2025 for evaluation and management with a chief complaint of General Illness (Entered automatically based on patient selection in [employer].)     I evaluated the questionnaire submission on 03/06/2025 .    Total Time (in minutes): 13     Ohs Peq Evisit General    3/6/2025 10:26 AM CST - Filed by Patient   Do you agree to participate in an E-Visit? Yes   If you have any of the following symptoms, please go to the nearest emergency room or call 911: I acknowledge   Choose the state of your primary residence [place]   What is the main issue you would like addressed today? Persistant cough   Please describe your symptoms. Coughing that results in little to no expectorant.   Where is your problem located? Mainly in the throat above the clavicular notch.   On a scale of 1-10, where 10 is the worst you can imagine, how severe are your symptoms? (range: 1 - 10) 8   Have you had these symptoms before? Yes   How long have you had these symptoms? More than a month   What helps with your symptoms? Nothing   What makes your symptoms feel worse? Pollen   Are your symptoms related to a condition you currently have? No   Please describe any probable cause for your symptoms. Allergies   Provide any additional information you feel is important. No fever   Please attach any relevant images or files    Are you able to take your vital signs? No          Active Problem List with Overview Notes    Diagnosis Date Noted    Imbalance 06/20/2024    Weakness of both lower extremities 06/20/2024    Paroxysmal atrial fibrillation 05/01/2024    Vulvar atrophy 05/01/2024    Drug reaction 04/23/2024    Nondisplaced fracture of left humerus 04/22/2024    At risk for allergic reaction to medication 04/22/2024    Left upper arm pain  04/22/2024    Falls 04/22/2024    Female genuine stress incontinence 01/18/2024    Chronic constipation 01/18/2024    Vaginal atrophy 01/18/2024    Anxiety 12/15/2023    History of breast cancer 09/13/2022    Class 2 severe obesity due to excess calories with serious comorbidity and body mass index (BMI) of 36.0 to 36.9 in adult 03/07/2022    Hyperparathyroidism 09/13/2021    Ductal carcinoma in situ (DCIS) of right breast 08/18/2021    Hypercalcemia 07/29/2021    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 07/19/2021    ACP (advance care planning) 12/04/2020    Essential hypertension 12/04/2020    Binge eating disorder 11/06/2020    Mixed hyperlipidemia 09/30/2019    Fibromyalgia     Deficiency of clotting factor      Factor 11      History of hepatitis C      s/p Interferon & Ribavarin       Migraines     Achilles tendon contracture due to neurologic cause, left 08/13/2018    Achilles tendonosis of left lower extremity 08/13/2018    History of atrial fibrillation 09/09/2015    Hypothyroidism 09/09/2015    Gastroesophageal reflux disease without esophagitis 09/09/2015      Recent Labs Obtained:  Lab Results   Component Value Date    WBC 4.81 04/22/2024    HGB 12.6 04/22/2024    HCT 39.0 04/22/2024    MCV 93 04/22/2024     04/22/2024     01/27/2025    K 4.8 01/27/2025    GLU 99 01/27/2025    CREATININE 0.93 01/27/2025    EGFRNORACEVR >60 01/27/2025    HGBA1C 5.5 11/10/2023    TSH 2.940 06/21/2024      Review of patient's allergies indicates:   Allergen Reactions    Meperidine Itching    Adhesive      Tape    Statins-hmg-coa reductase inhibitors      Joint pains       Oxycodone Anxiety     Pt became very agitated, anxious, nauseated shortly after taking oxycodone.     Wegovy [semaglutide (weight loss)] Nausea And Vomiting       Encounter Diagnosis   Name Primary?    Cough, unspecified type Yes        Orders Placed This Encounter   Procedures    X-Ray Chest PA And Lateral     Standing  Status:   Future     Expected Date:   3/6/2025     Expiration Date:   3/6/2026     May the Radiologist modify the order per protocol to meet the clinical needs of the patient?:   Yes      Medications Ordered This Encounter   Medications    chlorpheniramine-dextromethorp 4-30 mg Tab     Sig: Take 1 tablet by mouth every 6 (six) hours as needed (cough).     Dispense:  30 each     Refill:  1      Will get xray consider inhaled steroid trial   E-Visit Time Tracking:    Day 1 Time (in minutes): 13    Total Time (in minutes): 13      274}

## 2025-03-08 ENCOUNTER — RESULTS FOLLOW-UP (OUTPATIENT)
Dept: FAMILY MEDICINE | Facility: CLINIC | Age: 80
End: 2025-03-08

## 2025-03-10 ENCOUNTER — E-VISIT (OUTPATIENT)
Dept: FAMILY MEDICINE | Facility: CLINIC | Age: 80
End: 2025-03-10
Payer: MEDICARE

## 2025-03-10 DIAGNOSIS — R30.0 DYSURIA: Primary | ICD-10-CM

## 2025-03-10 DIAGNOSIS — R05.3 CHRONIC COUGH: ICD-10-CM

## 2025-03-10 RX ORDER — FLUTICASONE PROPIONATE AND SALMETEROL 100; 50 UG/1; UG/1
1 POWDER RESPIRATORY (INHALATION) 2 TIMES DAILY
Qty: 180 EACH | Refills: 3 | Status: SHIPPED | OUTPATIENT
Start: 2025-03-10 | End: 2026-03-10

## 2025-03-10 RX ORDER — CEFDINIR 300 MG/1
300 CAPSULE ORAL 2 TIMES DAILY
Qty: 14 CAPSULE | Refills: 0 | Status: SHIPPED | OUTPATIENT
Start: 2025-03-10 | End: 2025-03-17

## 2025-03-11 NOTE — PROGRESS NOTES
Patient ID: Lauren Junior is a 79 y.o. female.    Chief Complaint: General Illness (Entered automatically based on patient selection in [employer].)    The patient initiated a request through [employer] on 3/10/2025 for evaluation and management with a chief complaint of General Illness (Entered automatically based on patient selection in [employer].)     I evaluated the questionnaire submission on 3/10/2025.    Ohs Peq Evisit Supergroup-Cough And Cold    3/10/2025 10:23 AM CDT - Filed by Patient   What do you need help with? Cough, Cold, Sore Throat   Do you agree to participate in an E-Visit? Yes   If you have any of the following symptoms, go to your local emergency room or call 911: I acknowledge   What is the main issue you would like addressed today? Cough continues after evisit with Dr Mims. Xray neg I started with UTI yesterday also. My  is having pacer replacement. I cant leave. Need to get better for  82th birthday party. The guest start coming in Thursday.   Do you think you might have COVID-19 or the Flu? No   Have you tested positive for COVID-19 or Flu? No   What symptoms do you currently have?  Cough   Describe your cough: Does not stop   Have you ever smoked? Smoked in the past   Have you had a fever? No   When did your symptoms first appear? 10/2/2024   In the last two weeks, have you been in close contact with someone who has COVID-19, the Flu, or strep throat? No   List what you have done or taken to help your symptoms. At home COVID NEG.Multiple OTC meds for cough and Allegra daily.   On a scale of 1-10, where 10 is the worst you can imagine, how severe are your symptoms? (range: 1 - 10) 8   Have your symptoms changed since they first started? Worsened   Do you have transportation to an [facility] location to get tested for COVID-19? No   Provide any additional information you feel is important. I also have UTI.   Please attach any relevant images or files    Are you able to take your vital  signs? No         Encounter Diagnosis   Name Primary?    Dysuria Yes        Orders Placed This Encounter   Procedures    Urine Culture High Risk           Standing Status:   Future     Expected Date:   3/10/2025     Expiration Date:   6/8/2026     Indicated criteria for high risk culture::   Other     Other (specify)::   I need a urine culture    Urinalysis           Collection Type:   Urine, Clean Catch      Medications Ordered This Encounter   Medications    cefdinir (OMNICEF) 300 MG capsule     Sig: Take 1 capsule (300 mg total) by mouth 2 (two) times daily. for 7 days     Dispense:  14 capsule     Refill:  0        No follow-ups on file.      E-Visit Time Tracking:    Day 1 Time (in minutes): 7    Total Time (in minutes): 7

## 2025-03-13 ENCOUNTER — PATIENT MESSAGE (OUTPATIENT)
Dept: FAMILY MEDICINE | Facility: CLINIC | Age: 80
End: 2025-03-13
Payer: MEDICARE

## 2025-03-16 ENCOUNTER — RESULTS FOLLOW-UP (OUTPATIENT)
Dept: FAMILY MEDICINE | Facility: CLINIC | Age: 80
End: 2025-03-16

## 2025-04-05 ENCOUNTER — PATIENT MESSAGE (OUTPATIENT)
Dept: FAMILY MEDICINE | Facility: CLINIC | Age: 80
End: 2025-04-05
Payer: MEDICARE

## 2025-04-25 ENCOUNTER — OFFICE VISIT (OUTPATIENT)
Dept: FAMILY MEDICINE | Facility: CLINIC | Age: 80
End: 2025-04-25
Payer: MEDICARE

## 2025-04-25 VITALS
SYSTOLIC BLOOD PRESSURE: 132 MMHG | OXYGEN SATURATION: 97 % | HEIGHT: 68 IN | DIASTOLIC BLOOD PRESSURE: 88 MMHG | WEIGHT: 255.75 LBS | BODY MASS INDEX: 38.76 KG/M2 | HEART RATE: 87 BPM

## 2025-04-25 DIAGNOSIS — R55 SYNCOPE, UNSPECIFIED SYNCOPE TYPE: Primary | ICD-10-CM

## 2025-04-25 DIAGNOSIS — I10 ESSENTIAL HYPERTENSION: ICD-10-CM

## 2025-04-25 DIAGNOSIS — I48.0 PAROXYSMAL ATRIAL FIBRILLATION: ICD-10-CM

## 2025-04-25 DIAGNOSIS — E66.812 CLASS 2 SEVERE OBESITY DUE TO EXCESS CALORIES WITH SERIOUS COMORBIDITY AND BODY MASS INDEX (BMI) OF 38.0 TO 38.9 IN ADULT: ICD-10-CM

## 2025-04-25 DIAGNOSIS — F41.9 ANXIETY: ICD-10-CM

## 2025-04-25 DIAGNOSIS — M79.7 FIBROMYALGIA: ICD-10-CM

## 2025-04-25 DIAGNOSIS — E03.9 ACQUIRED HYPOTHYROIDISM: ICD-10-CM

## 2025-04-25 DIAGNOSIS — E66.01 CLASS 2 SEVERE OBESITY DUE TO EXCESS CALORIES WITH SERIOUS COMORBIDITY AND BODY MASS INDEX (BMI) OF 38.0 TO 38.9 IN ADULT: ICD-10-CM

## 2025-04-25 PROBLEM — E21.3 HYPERPARATHYROIDISM: Status: RESOLVED | Noted: 2021-09-13 | Resolved: 2025-04-25

## 2025-04-25 PROCEDURE — 99213 OFFICE O/P EST LOW 20 MIN: CPT | Mod: PBBFAC,PO | Performed by: INTERNAL MEDICINE

## 2025-04-25 PROCEDURE — 99999 PR PBB SHADOW E&M-EST. PATIENT-LVL III: CPT | Mod: PBBFAC,,, | Performed by: INTERNAL MEDICINE

## 2025-04-25 RX ORDER — DULOXETIN HYDROCHLORIDE 60 MG/1
60 CAPSULE, DELAYED RELEASE ORAL DAILY
Qty: 30 CAPSULE | Refills: 11 | Status: SHIPPED | OUTPATIENT
Start: 2025-04-25 | End: 2026-04-25

## 2025-04-25 NOTE — PROGRESS NOTES
[facility]  Primary Care   [address] [facility] [address]       Patient ID: Lauren Junior     Chief Complaint:   Chief Complaint   Patient presents with    Wellness     3 month follow up        HPI:  Routine follow-up and she admits to feeling more anxious than usual and I think it is due to her recent medical issues really impacting her activities of daily living.  She had a fall awhile ago and broke her left humerus but thankfully that healed and she went through extensive physical therapy for 6 months but since then she said at least 2 other falls that were not mechanical.  In 1 instance she was lifting a pot up to hang it on a pot rack and then woke up on the ground and another instance she turned in her yd and fell thankfully without injury.  Because of this she does not feel confident to do things she likes to such as gardening and I think that is impacting in her more than we know.  She does carry a diagnose this of fibromyalgia and does have Effexor 150 mg that she takes daily along with Elavil and Lyrica.  We never did a workup for syncope when it happened many months ago so I will take this opportunity to get an echocardiogram and a carotid ultrasound.  She does have paroxysmal atrial fibrillation but that is no indication that is coming back unfortunately we do not monitor her either so that has something we can consider.  I want her to get some blood work today as to check for any electrolyte abnormalities.  She did have her parathyroids out 1-1/2 years ago so going to pay particular attention to her calcium and phosphorus levels has these could cause syncope.  I am also going to stop the Effexor and start Cymbalta has a find a just works better for not only fibromyalgia and headache prevention but anxiety and depression because think some of that is occurring as well due to this major life change.  We will plan to see each other again on a short-term basis and I hope that she is feeling  "better at that time.    Review of Systems       Syncope     Objective:      Physical Exam   Physical Exam       Obese    Vitals:   Vitals:    04/25/25 1123   BP: 132/88   Pulse: 87   SpO2: 97%   Weight: 116 kg (255 lb 11.7 oz)   Height: 5' 8" (1.727 m)        Assessment:           Plan:       Lauren Junior  was seen today for follow-up and may need lab work.    Diagnoses and all orders for this visit:    Lauren was seen today for wellness.    Diagnoses and all orders for this visit:    Syncope, unspecified syncope type  -     Echo Saline Bubble? No; Ultrasound enhancing contrast? Yes; Future  -     US Carotid Bilateral; Future  -     CBC Auto Differential; Future  -     Comprehensive Metabolic Panel; Future  -     Phosphorus; Future  -     TSH; Future  Check labs  and Ultrasound and Echo    Essential hypertension  -     Comprehensive Metabolic Panel; Future  Controlled with Amlodipine and hydrate well with water and electrolytes     Paroxysmal atrial fibrillation  -     CBC Auto Differential; Future  -     Comprehensive Metabolic Panel; Future  Heart rate seems stable but may need more Metoprolol     Class 2 severe obesity due to excess calories with serious comorbidity and body mass index (BMI) of 38.0 to 38.9 in adult  Monitor     Fibromyalgia  -     DULoxetine (CYMBALTA) 60 MG capsule; Take 1 capsule (60 mg total) by mouth once daily.  Switch Effexor to Cymbalta and Monitor symptoms     Acquired hypothyroidism  -     TSH; Future  Monitor Labs on Levohyroxine    Anxiety  Flaring now due to syncope    Visit today included increased complexity associated with the care of the episodic problem Syncope A-fib Hypothyroid hypertension  addressed and managing the longitudinal care of the patient due to the serious and/or complex managed problem(s) .           Connor Viramontes MD    "

## 2025-05-04 ENCOUNTER — RESULTS FOLLOW-UP (OUTPATIENT)
Dept: FAMILY MEDICINE | Facility: CLINIC | Age: 80
End: 2025-05-04
Payer: MEDICARE

## 2025-05-20 ENCOUNTER — PATIENT MESSAGE (OUTPATIENT)
Dept: FAMILY MEDICINE | Facility: CLINIC | Age: 80
End: 2025-05-20
Payer: MEDICARE

## 2025-05-21 ENCOUNTER — OFFICE VISIT (OUTPATIENT)
Dept: FAMILY MEDICINE | Facility: CLINIC | Age: 80
End: 2025-05-21
Payer: MEDICARE

## 2025-05-21 DIAGNOSIS — Z78.0 MENOPAUSE: ICD-10-CM

## 2025-05-21 DIAGNOSIS — I48.0 PAROXYSMAL ATRIAL FIBRILLATION: ICD-10-CM

## 2025-05-21 DIAGNOSIS — R55 SYNCOPE, UNSPECIFIED SYNCOPE TYPE: Primary | ICD-10-CM

## 2025-05-21 DIAGNOSIS — I10 ESSENTIAL HYPERTENSION: ICD-10-CM

## 2025-05-21 PROCEDURE — 98004 SYNCH AUDIO-VIDEO EST SF 10: CPT | Mod: 95,,, | Performed by: INTERNAL MEDICINE

## 2025-05-21 PROCEDURE — G2211 COMPLEX E/M VISIT ADD ON: HCPCS | Mod: 95,,, | Performed by: INTERNAL MEDICINE

## 2025-05-21 NOTE — PROGRESS NOTES
[facility]  Primary [facility]   [address] [facility]       Patient ID: Lauren Junior     Chief Complaint: Follow up for syncope workup     The patient location is: [place]   The chief complaint leading to consultation is: Follow up for syncope workup     Visit type: audiovisual    Face to Face time with patient: 10 mins   13 minutes of total time spent on the encounter, which includes face to face time and non-face to face time preparing to see the patient (eg, review of tests), Obtaining and/or reviewing separately obtained history, Documenting clinical information in the electronic or other health record, Independently interpreting results (not separately reported) and communicating results to the patient/family/caregiver, or Care coordination (not separately reported).         Each patient to whom he or she provides medical services by telemedicine is:  (1) informed of the relationship between the physician and patient and the respective role of any other health care provider with respect to management of the patient; and (2) notified that he or she may decline to receive medical services by telemedicine and may withdraw from such care at any time.    Notes:       HPI: Follow up for syncope workup that was normal / negative except for some dehydration. She thinks her trifocals may have made her dizzy, so she got cataracts remove will only need reading glasses. She thinks her legs could be stronger. I offered Physical Therapy but she would like to try home exercises first. She questions the need for Neurology, but I think her syncope was more related to dehydration/ leg weakness. She feels the same on Cymbalta as she did on Effexor, so we will continue it.     Review of Systems       Leg weakness     Objective:      Physical Exam   Physical Exam       Virtual Visit     Vitals: There were no vitals filed for this visit.     Assessment:           Plan:       Lauren Junior  was seen today for  follow-up and may need lab work.    Diagnoses and all orders for this visit:    Diagnoses and all orders for this visit:    Syncope, unspecified syncope type  Most likely due to a combination of leg weakness, blurred vision, and dehydration.  Thankfully we have almost corrected to with a 3 and she will work on leg strengthening exercises at home.  Workup was essentially normal/negative    Essential hypertension  Controlled with amlodipine and metoprolol, but monitor for signs and symptoms orthostatic hypotension.  She is familiar with what these would look like.    Paroxysmal atrial fibrillation  No hint of AFib recurrence so she does have an Apple watch which should alert her if it occurs.    Visit today included increased complexity associated with the care of the episodic problem syncope hypertension atrial fibrillation addressed and managing the longitudinal care of the patient due to the serious and/or complex managed problem(s) .           Connor Viramontes MD

## 2025-06-08 ENCOUNTER — PATIENT MESSAGE (OUTPATIENT)
Dept: FAMILY MEDICINE | Facility: CLINIC | Age: 80
End: 2025-06-08
Payer: MEDICARE

## 2025-06-08 DIAGNOSIS — M79.7 FIBROMYALGIA: Primary | ICD-10-CM

## 2025-06-11 RX ORDER — DULOXETIN HYDROCHLORIDE 30 MG/1
60 CAPSULE, DELAYED RELEASE ORAL DAILY
Qty: 180 CAPSULE | Refills: 3 | Status: SHIPPED | OUTPATIENT
Start: 2025-06-11 | End: 2026-06-11

## 2025-07-09 ENCOUNTER — HOSPITAL ENCOUNTER (OUTPATIENT)
Dept: RADIOLOGY | Facility: HOSPITAL | Age: 80
Discharge: HOME | End: 2025-07-09
Attending: INTERNAL MEDICINE
Payer: MEDICARE

## 2025-07-09 DIAGNOSIS — Z78.0 MENOPAUSE: ICD-10-CM

## 2025-07-09 PROCEDURE — 77080 DXA BONE DENSITY AXIAL: CPT | Mod: 26,,, | Performed by: RADIOLOGY

## 2025-07-09 PROCEDURE — 77080 DXA BONE DENSITY AXIAL: CPT | Mod: TC,FY,PO

## 2025-07-29 ENCOUNTER — OFFICE VISIT (OUTPATIENT)
Dept: FAMILY MEDICINE | Facility: CLINIC | Age: 80
End: 2025-07-29
Payer: MEDICARE

## 2025-07-29 DIAGNOSIS — I10 ESSENTIAL HYPERTENSION: ICD-10-CM

## 2025-07-29 DIAGNOSIS — M25.59 PAIN IN OTHER JOINT: ICD-10-CM

## 2025-07-29 DIAGNOSIS — F41.9 ANXIETY: ICD-10-CM

## 2025-07-29 DIAGNOSIS — Z86.39 HISTORY OF HYPERPARATHYROIDISM: ICD-10-CM

## 2025-07-29 DIAGNOSIS — G43.009 MIGRAINE WITHOUT AURA AND WITHOUT STATUS MIGRAINOSUS, NOT INTRACTABLE: Primary | ICD-10-CM

## 2025-07-29 DIAGNOSIS — I48.0 PAROXYSMAL ATRIAL FIBRILLATION: ICD-10-CM

## 2025-07-29 DIAGNOSIS — E78.2 MIXED HYPERLIPIDEMIA: ICD-10-CM

## 2025-07-29 DIAGNOSIS — M79.7 FIBROMYALGIA: ICD-10-CM

## 2025-07-29 DIAGNOSIS — E03.9 ACQUIRED HYPOTHYROIDISM: ICD-10-CM

## 2025-07-29 PROBLEM — E21.3 HYPERPARATHYROIDISM: Status: ACTIVE | Noted: 2021-07-29

## 2025-07-29 PROCEDURE — 98004 SYNCH AUDIO-VIDEO EST SF 10: CPT | Mod: 95,,, | Performed by: INTERNAL MEDICINE

## 2025-07-29 PROCEDURE — G2211 COMPLEX E/M VISIT ADD ON: HCPCS | Mod: 95,,, | Performed by: INTERNAL MEDICINE

## 2025-07-29 RX ORDER — TRAMADOL HYDROCHLORIDE 50 MG/1
50 TABLET, FILM COATED ORAL EVERY 6 HOURS
Qty: 120 TABLET | Refills: 0 | Status: SHIPPED | OUTPATIENT
Start: 2025-07-29

## 2025-07-29 RX ORDER — DULOXETIN HYDROCHLORIDE 30 MG/1
90 CAPSULE, DELAYED RELEASE ORAL DAILY
Qty: 270 CAPSULE | Refills: 3 | Status: SHIPPED | OUTPATIENT
Start: 2025-07-29 | End: 2026-07-29

## 2025-07-29 NOTE — PROGRESS NOTES
[facility]  Primary Care   [address] [facility] [address]       Patient ID: Lauren Junior     Chief Complaint: Follow up for Syncope    The patient location is: [place]   The chief complaint leading to consultation is: Follow up for Syncope     Visit type: audiovisual    Face to Face time with patient:  10 minutes  Thirteen minutes of total time spent on the encounter, which includes face to face time and non-face to face time preparing to see the patient (eg, review of tests), Obtaining and/or reviewing separately obtained history, Documenting clinical information in the electronic or other health record, Independently interpreting results (not separately reported) and communicating results to the patient/family/caregiver, or Care coordination (not separately reported).         Each patient to whom he or she provides medical services by telemedicine is:  (1) informed of the relationship between the physician and patient and the respective role of any other health care provider with respect to management of the patient; and (2) notified that he or she may decline to receive medical services by telemedicine and may withdraw from such care at any time.    Notes:         HPI: Follow up for syncope that was most likely due to dehydration and leg weakness. She did go to the gym today and I recommend that she go when she can and try to be consistent.  Bone density scan does show a strong spine and very mild thinning in her hips.  I do not think she needs Fosamax at this point but I do recommend an over-the-counter Caltrate with vitamin-D supplement to take twice daily.  She does have a history of hypercalcemia but that has been resolved so I do think she can tolerate the extra calcium in her diet.  Switching from Effexor to Cymbalta has greatly improved her anxiety but it is not as effective in controlling her migraines, so she requests an increase in the dose to 60 mg in the morning and 30 mg in the evening  and we will see how that works.  Labs from few months ago do show that she is a bit dehydrated and she is conscious about keeping very well hydrated so she is surprised about the.  Really was she has a dry now and I think that is due the Elavil.  She requests a refill on tramadol that she takes for various arthritis pain.  We will plan to reconvene in 6 months with some labs just prior to that.    Review of Systems       Arthralgias    Objective:      Physical Exam   Physical Exam       Virtual visit    Vitals: There were no vitals filed for this visit.     Assessment:           Plan:       Lauren Junior  was seen today for follow-up and may need lab work.    Diagnoses and all orders for this visit:    Diagnoses and all orders for this visit:    Migraine without aura and without status migrainosus, not intractable  Uncontrolled with amitriptyline and Cymbalta so we will see how that responds to an increase in Cymbalta    Fibromyalgia  -     DULoxetine (CYMBALTA) 30 MG capsule; Take 3 capsules (90 mg total) by mouth once daily. Take 60 mg in the Am and 30 mg in the Pm  Improved but not resolved so we will increase Cymbalta dose and monitor    Pain in other joint  -     traMADoL (ULTRAM) 50 mg tablet; Take 1 tablet (50 mg total) by mouth every 6 (six) hours.  Controlled with tramadol taken sparingly    Anxiety  Controlled with Cymbalta    Essential hypertension  -     Comprehensive Metabolic Panel; Future  Controlled with amlodipine and metoprolol    Paroxysmal atrial fibrillation  -     Comprehensive Metabolic Panel; Future  -     CBC Auto Differential; Future  Controlled with metoprolol    History of hyperparathyroidism  Resolved    Acquired hypothyroidism  -     TSH; Future  Controlled with levothyroxine and monitor TSH    Mixed hyperlipidemia  -     Lipid Panel; Future  Controlled with Zetia    Visit today included increased complexity associated with the care of the episodic problem hypertension anxiety migraine  hypothyroidism addressed and managing the longitudinal care of the patient due to the serious and/or complex managed problem(s) .           Connor Viramontes MD

## 2025-08-29 DIAGNOSIS — I10 ESSENTIAL HYPERTENSION: ICD-10-CM

## 2025-08-29 RX ORDER — AMLODIPINE BESYLATE 5 MG/1
5 TABLET ORAL DAILY
Qty: 90 TABLET | Refills: 2 | Status: SHIPPED | OUTPATIENT
Start: 2025-08-29

## 2025-08-30 DIAGNOSIS — M79.7 FIBROMYALGIA: ICD-10-CM

## 2025-08-31 RX ORDER — AMITRIPTYLINE HYDROCHLORIDE 100 MG/1
100 TABLET ORAL NIGHTLY
Qty: 90 TABLET | Refills: 3 | Status: SHIPPED | OUTPATIENT
Start: 2025-08-31